# Patient Record
Sex: MALE | Race: WHITE | Employment: OTHER | URBAN - METROPOLITAN AREA
[De-identification: names, ages, dates, MRNs, and addresses within clinical notes are randomized per-mention and may not be internally consistent; named-entity substitution may affect disease eponyms.]

---

## 2019-05-15 ENCOUNTER — APPOINTMENT (OUTPATIENT)
Dept: GENERAL RADIOLOGY | Age: 84
DRG: 493 | End: 2019-05-15
Payer: MEDICARE

## 2019-05-15 ENCOUNTER — HOSPITAL ENCOUNTER (INPATIENT)
Age: 84
LOS: 6 days | Discharge: OTHER INSTITUTION | DRG: 493 | End: 2019-05-21
Attending: EMERGENCY MEDICINE | Admitting: FAMILY MEDICINE
Payer: MEDICARE

## 2019-05-15 DIAGNOSIS — S82.109A CLOSED FRACTURE OF PROXIMAL END OF TIBIA, UNSPECIFIED FRACTURE MORPHOLOGY, UNSPECIFIED LATERALITY, INITIAL ENCOUNTER: ICD-10-CM
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
## 2019-05-15 PROBLEM — S82.242A CLOSED DISPLACED SPIRAL FRACTURE OF SHAFT OF LEFT TIBIA: Status: ACTIVE | Noted: 2019-05-15

## 2019-05-15 LAB
ALBUMIN SERPL-MCNC: 4 G/DL (ref 3.5–5.2)
ALP BLD-CCNC: 90 U/L (ref 40–129)
ALT SERPL-CCNC: 16 U/L (ref 0–40)
ANION GAP SERPL CALCULATED.3IONS-SCNC: 8 MMOL/L (ref 7–16)
AST SERPL-CCNC: 40 U/L (ref 0–39)
BILIRUB SERPL-MCNC: 1.2 MG/DL (ref 0–1.2)
BUN BLDV-MCNC: 23 MG/DL (ref 8–23)
CALCIUM SERPL-MCNC: 9.3 MG/DL (ref 8.6–10.2)
CHLORIDE BLD-SCNC: 105 MMOL/L (ref 98–107)
CO2: 27 MMOL/L (ref 22–29)
CREAT SERPL-MCNC: 1.1 MG/DL (ref 0.7–1.2)
GFR AFRICAN AMERICAN: >60
GFR NON-AFRICAN AMERICAN: >60 ML/MIN/1.73
GLUCOSE BLD-MCNC: 127 MG/DL (ref 74–99)
POTASSIUM SERPL-SCNC: 6.5 MMOL/L (ref 3.5–5)
SODIUM BLD-SCNC: 140 MMOL/L (ref 132–146)
TOTAL PROTEIN: 6.3 G/DL (ref 6.4–8.3)

## 2019-05-15 PROCEDURE — 96374 THER/PROPH/DIAG INJ IV PUSH: CPT

## 2019-05-15 PROCEDURE — 73590 X-RAY EXAM OF LOWER LEG: CPT

## 2019-05-15 PROCEDURE — 1200000000 HC SEMI PRIVATE

## 2019-05-15 PROCEDURE — 73564 X-RAY EXAM KNEE 4 OR MORE: CPT

## 2019-05-15 PROCEDURE — 36415 COLL VENOUS BLD VENIPUNCTURE: CPT

## 2019-05-15 PROCEDURE — 71045 X-RAY EXAM CHEST 1 VIEW: CPT

## 2019-05-15 PROCEDURE — 73552 X-RAY EXAM OF FEMUR 2/>: CPT

## 2019-05-15 PROCEDURE — 80053 COMPREHEN METABOLIC PANEL: CPT

## 2019-05-15 PROCEDURE — 85025 COMPLETE CBC W/AUTO DIFF WBC: CPT

## 2019-05-15 PROCEDURE — 99285 EMERGENCY DEPT VISIT HI MDM: CPT

## 2019-05-15 PROCEDURE — 73562 X-RAY EXAM OF KNEE 3: CPT

## 2019-05-15 PROCEDURE — 6360000002 HC RX W HCPCS: Performed by: STUDENT IN AN ORGANIZED HEALTH CARE EDUCATION/TRAINING PROGRAM

## 2019-05-15 RX ORDER — UBIDECARENONE 100 MG
100 CAPSULE ORAL DAILY
COMMUNITY
End: 2021-01-01

## 2019-05-15 RX ORDER — FENTANYL CITRATE 50 UG/ML
INJECTION, SOLUTION INTRAMUSCULAR; INTRAVENOUS
Status: DISCONTINUED
Start: 2019-05-15 | End: 2019-05-16 | Stop reason: WASHOUT

## 2019-05-15 RX ORDER — LANOLIN ALCOHOL/MO/W.PET/CERES
500 CREAM (GRAM) TOPICAL NIGHTLY
COMMUNITY

## 2019-05-15 RX ORDER — FENTANYL CITRATE 50 UG/ML
50 INJECTION, SOLUTION INTRAMUSCULAR; INTRAVENOUS ONCE
Status: COMPLETED | OUTPATIENT
Start: 2019-05-15 | End: 2019-05-15

## 2019-05-15 RX ORDER — CEFAZOLIN SODIUM 2 G/50ML
2 SOLUTION INTRAVENOUS
Status: CANCELLED | OUTPATIENT
Start: 2019-05-15 | End: 2019-05-15

## 2019-05-15 RX ORDER — DOCUSATE SODIUM 100 MG/1
100 CAPSULE, LIQUID FILLED ORAL NIGHTLY
COMMUNITY
End: 2022-01-01

## 2019-05-15 RX ADMIN — FENTANYL CITRATE 50 MCG: 50 INJECTION, SOLUTION INTRAMUSCULAR; INTRAVENOUS at 23:22

## 2019-05-15 ASSESSMENT — PAIN SCALES - GENERAL
PAINLEVEL_OUTOF10: 7
PAINLEVEL_OUTOF10: 6

## 2019-05-15 ASSESSMENT — PAIN DESCRIPTION - PAIN TYPE: TYPE: ACUTE PAIN

## 2019-05-16 ENCOUNTER — ANESTHESIA (OUTPATIENT)
Dept: OPERATING ROOM | Age: 84
DRG: 493 | End: 2019-05-16
Payer: MEDICARE

## 2019-05-16 ENCOUNTER — TELEPHONE (OUTPATIENT)
Dept: ORTHOPEDIC SURGERY | Age: 84
End: 2019-05-16

## 2019-05-16 ENCOUNTER — ANESTHESIA EVENT (OUTPATIENT)
Dept: OPERATING ROOM | Age: 84
DRG: 493 | End: 2019-05-16
Payer: MEDICARE

## 2019-05-16 ENCOUNTER — APPOINTMENT (OUTPATIENT)
Dept: GENERAL RADIOLOGY | Age: 84
DRG: 493 | End: 2019-05-16
Payer: MEDICARE

## 2019-05-16 ENCOUNTER — APPOINTMENT (OUTPATIENT)
Dept: CT IMAGING | Age: 84
DRG: 493 | End: 2019-05-16
Payer: MEDICARE

## 2019-05-16 VITALS
DIASTOLIC BLOOD PRESSURE: 83 MMHG | SYSTOLIC BLOOD PRESSURE: 140 MMHG | RESPIRATION RATE: 3 BRPM | OXYGEN SATURATION: 96 %
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
## 2019-05-16 LAB
ABO/RH: NORMAL
ABO/RH: NORMAL
ANTIBODY SCREEN: NORMAL
APTT: 27.2 SEC (ref 24.5–35.1)
BASOPHILS ABSOLUTE: 0 E9/L (ref 0–0.2)
BASOPHILS RELATIVE PERCENT: 0 % (ref 0–2)
EKG ATRIAL RATE: 76 BPM
EKG P AXIS: 80 DEGREES
EKG P-R INTERVAL: 150 MS
EKG Q-T INTERVAL: 444 MS
EKG QRS DURATION: 104 MS
EKG QTC CALCULATION (BAZETT): 499 MS
EKG R AXIS: -4 DEGREES
EKG T AXIS: 129 DEGREES
EKG VENTRICULAR RATE: 76 BPM
EOSINOPHILS ABSOLUTE: 0.22 E9/L (ref 0.05–0.5)
EOSINOPHILS RELATIVE PERCENT: 1 % (ref 0–6)
HCT VFR BLD CALC: 46.9 % (ref 37–54)
HEMOGLOBIN: 15.4 G/DL (ref 12.5–16.5)
INR BLD: 1.2
LYMPHOCYTES ABSOLUTE: 17.39 E9/L (ref 1.5–4)
LYMPHOCYTES RELATIVE PERCENT: 78 % (ref 20–42)
MCH RBC QN AUTO: 30.9 PG (ref 26–35)
MCHC RBC AUTO-ENTMCNC: 32.8 % (ref 32–34.5)
MCV RBC AUTO: 94.2 FL (ref 80–99.9)
MONOCYTES ABSOLUTE: 0.67 E9/L (ref 0.1–0.95)
MONOCYTES RELATIVE PERCENT: 3 % (ref 2–12)
NEUTROPHILS ABSOLUTE: 4.01 E9/L (ref 1.8–7.3)
NEUTROPHILS RELATIVE PERCENT: 18 % (ref 43–80)
PDW BLD-RTO: 13.8 FL (ref 11.5–15)
PLATELET # BLD: 119 E9/L (ref 130–450)
PMV BLD AUTO: 11 FL (ref 7–12)
PROTHROMBIN TIME: 13.8 SEC (ref 9.3–12.4)
RBC # BLD: 4.98 E12/L (ref 3.8–5.8)
RBC # BLD: NORMAL 10*6/UL
SMUDGE CELLS: ABNORMAL
WBC # BLD: 22.3 E9/L (ref 4.5–11.5)

## 2019-05-16 PROCEDURE — 0QSH3CZ REPOSITION LEFT TIBIA WITH RING EXTERNAL FIXATION DEVICE, PERCUTANEOUS APPROACH: ICD-10-PCS | Performed by: ORTHOPAEDIC SURGERY

## 2019-05-16 PROCEDURE — 36415 COLL VENOUS BLD VENIPUNCTURE: CPT

## 2019-05-16 PROCEDURE — 2720000010 HC SURG SUPPLY STERILE: Performed by: ORTHOPAEDIC SURGERY

## 2019-05-16 PROCEDURE — 6360000002 HC RX W HCPCS: Performed by: STUDENT IN AN ORGANIZED HEALTH CARE EDUCATION/TRAINING PROGRAM

## 2019-05-16 PROCEDURE — 73560 X-RAY EXAM OF KNEE 1 OR 2: CPT

## 2019-05-16 PROCEDURE — 27752 TREATMENT OF TIBIA FRACTURE: CPT | Performed by: ORTHOPAEDIC SURGERY

## 2019-05-16 PROCEDURE — 86901 BLOOD TYPING SEROLOGIC RH(D): CPT

## 2019-05-16 PROCEDURE — 2580000003 HC RX 258: Performed by: NURSE ANESTHETIST, CERTIFIED REGISTERED

## 2019-05-16 PROCEDURE — 86850 RBC ANTIBODY SCREEN: CPT

## 2019-05-16 PROCEDURE — 3700000001 HC ADD 15 MINUTES (ANESTHESIA): Performed by: ORTHOPAEDIC SURGERY

## 2019-05-16 PROCEDURE — 85610 PROTHROMBIN TIME: CPT

## 2019-05-16 PROCEDURE — 3600000005 HC SURGERY LEVEL 5 BASE: Performed by: ORTHOPAEDIC SURGERY

## 2019-05-16 PROCEDURE — 86900 BLOOD TYPING SEROLOGIC ABO: CPT

## 2019-05-16 PROCEDURE — 3600000015 HC SURGERY LEVEL 5 ADDTL 15MIN: Performed by: ORTHOPAEDIC SURGERY

## 2019-05-16 PROCEDURE — 70450 CT HEAD/BRAIN W/O DYE: CPT

## 2019-05-16 PROCEDURE — C1713 ANCHOR/SCREW BN/BN,TIS/BN: HCPCS | Performed by: ORTHOPAEDIC SURGERY

## 2019-05-16 PROCEDURE — 3209999900 FLUORO FOR SURGICAL PROCEDURES

## 2019-05-16 PROCEDURE — 1200000000 HC SEMI PRIVATE

## 2019-05-16 PROCEDURE — 99222 1ST HOSP IP/OBS MODERATE 55: CPT | Performed by: ORTHOPAEDIC SURGERY

## 2019-05-16 PROCEDURE — 20690 APPL UNIPLN UNI EXT FIXJ SYS: CPT | Performed by: ORTHOPAEDIC SURGERY

## 2019-05-16 PROCEDURE — 6370000000 HC RX 637 (ALT 250 FOR IP): Performed by: STUDENT IN AN ORGANIZED HEALTH CARE EDUCATION/TRAINING PROGRAM

## 2019-05-16 PROCEDURE — 93005 ELECTROCARDIOGRAM TRACING: CPT | Performed by: STUDENT IN AN ORGANIZED HEALTH CARE EDUCATION/TRAINING PROGRAM

## 2019-05-16 PROCEDURE — 85730 THROMBOPLASTIN TIME PARTIAL: CPT

## 2019-05-16 PROCEDURE — 7100000001 HC PACU RECOVERY - ADDTL 15 MIN: Performed by: ORTHOPAEDIC SURGERY

## 2019-05-16 PROCEDURE — 3700000000 HC ANESTHESIA ATTENDED CARE: Performed by: ORTHOPAEDIC SURGERY

## 2019-05-16 PROCEDURE — 2500000003 HC RX 250 WO HCPCS: Performed by: NURSE ANESTHETIST, CERTIFIED REGISTERED

## 2019-05-16 PROCEDURE — 2580000003 HC RX 258: Performed by: STUDENT IN AN ORGANIZED HEALTH CARE EDUCATION/TRAINING PROGRAM

## 2019-05-16 PROCEDURE — 7100000000 HC PACU RECOVERY - FIRST 15 MIN: Performed by: ORTHOPAEDIC SURGERY

## 2019-05-16 PROCEDURE — 27781 TREATMENT OF FIBULA FRACTURE: CPT | Performed by: ORTHOPAEDIC SURGERY

## 2019-05-16 PROCEDURE — 6370000000 HC RX 637 (ALT 250 FOR IP): Performed by: FAMILY MEDICINE

## 2019-05-16 PROCEDURE — 6360000002 HC RX W HCPCS: Performed by: NURSE ANESTHETIST, CERTIFIED REGISTERED

## 2019-05-16 PROCEDURE — 93010 ELECTROCARDIOGRAM REPORT: CPT | Performed by: INTERNAL MEDICINE

## 2019-05-16 DEVICE — SCREW EXT FIX L175MM DIA5MM THRD L60MM S STL SELF DRL SCHNZ: Type: IMPLANTABLE DEVICE | Site: TIBIA | Status: FUNCTIONAL

## 2019-05-16 DEVICE — SCREW FIX L200MM DIA5MM THRD L80MM S STL SELF DRL SCHNZ: Type: IMPLANTABLE DEVICE | Site: TIBIA | Status: FUNCTIONAL

## 2019-05-16 RX ORDER — ONDANSETRON 2 MG/ML
4 INJECTION INTRAMUSCULAR; INTRAVENOUS EVERY 6 HOURS PRN
Status: DISCONTINUED | OUTPATIENT
Start: 2019-05-16 | End: 2019-05-21 | Stop reason: HOSPADM

## 2019-05-16 RX ORDER — SODIUM CHLORIDE 0.9 % (FLUSH) 0.9 %
10 SYRINGE (ML) INJECTION EVERY 12 HOURS SCHEDULED
Status: DISCONTINUED | OUTPATIENT
Start: 2019-05-16 | End: 2019-05-21 | Stop reason: HOSPADM

## 2019-05-16 RX ORDER — DOCUSATE SODIUM 100 MG/1
100 CAPSULE, LIQUID FILLED ORAL 2 TIMES DAILY
Status: DISCONTINUED | OUTPATIENT
Start: 2019-05-16 | End: 2019-05-21 | Stop reason: HOSPADM

## 2019-05-16 RX ORDER — SODIUM CHLORIDE 9 MG/ML
INJECTION, SOLUTION INTRAVENOUS CONTINUOUS PRN
Status: DISCONTINUED | OUTPATIENT
Start: 2019-05-16 | End: 2019-05-16 | Stop reason: SDUPTHER

## 2019-05-16 RX ORDER — ATORVASTATIN CALCIUM 40 MG/1
40 TABLET, FILM COATED ORAL DAILY
Status: DISCONTINUED | OUTPATIENT
Start: 2019-05-16 | End: 2019-05-21 | Stop reason: HOSPADM

## 2019-05-16 RX ORDER — CEFAZOLIN SODIUM 2 G/50ML
2 SOLUTION INTRAVENOUS EVERY 8 HOURS
Status: COMPLETED | OUTPATIENT
Start: 2019-05-16 | End: 2019-05-17

## 2019-05-16 RX ORDER — ROCURONIUM BROMIDE 10 MG/ML
INJECTION, SOLUTION INTRAVENOUS PRN
Status: DISCONTINUED | OUTPATIENT
Start: 2019-05-16 | End: 2019-05-16 | Stop reason: SDUPTHER

## 2019-05-16 RX ORDER — CEFAZOLIN SODIUM 1 G/3ML
INJECTION, POWDER, FOR SOLUTION INTRAMUSCULAR; INTRAVENOUS PRN
Status: DISCONTINUED | OUTPATIENT
Start: 2019-05-16 | End: 2019-05-16 | Stop reason: SDUPTHER

## 2019-05-16 RX ORDER — FINASTERIDE 5 MG/1
5 TABLET, FILM COATED ORAL DAILY
Status: DISCONTINUED | OUTPATIENT
Start: 2019-05-16 | End: 2019-05-21 | Stop reason: HOSPADM

## 2019-05-16 RX ORDER — MULTIVITAMIN WITH FOLIC ACID 400 MCG
1 TABLET ORAL DAILY
Status: DISCONTINUED | OUTPATIENT
Start: 2019-05-16 | End: 2019-05-21 | Stop reason: HOSPADM

## 2019-05-16 RX ORDER — LIDOCAINE HYDROCHLORIDE 20 MG/ML
INJECTION, SOLUTION INTRAVENOUS PRN
Status: DISCONTINUED | OUTPATIENT
Start: 2019-05-16 | End: 2019-05-16 | Stop reason: SDUPTHER

## 2019-05-16 RX ORDER — OXYCODONE HYDROCHLORIDE AND ACETAMINOPHEN 5; 325 MG/1; MG/1
1 TABLET ORAL
Status: DISCONTINUED | OUTPATIENT
Start: 2019-05-16 | End: 2019-05-16

## 2019-05-16 RX ORDER — DOCUSATE SODIUM 100 MG/1
100 CAPSULE, LIQUID FILLED ORAL NIGHTLY
Status: DISCONTINUED | OUTPATIENT
Start: 2019-05-16 | End: 2019-05-16 | Stop reason: SDUPTHER

## 2019-05-16 RX ORDER — SODIUM CHLORIDE 0.9 % (FLUSH) 0.9 %
10 SYRINGE (ML) INJECTION PRN
Status: DISCONTINUED | OUTPATIENT
Start: 2019-05-16 | End: 2019-05-21 | Stop reason: HOSPADM

## 2019-05-16 RX ORDER — PROPOFOL 10 MG/ML
INJECTION, EMULSION INTRAVENOUS PRN
Status: DISCONTINUED | OUTPATIENT
Start: 2019-05-16 | End: 2019-05-16 | Stop reason: SDUPTHER

## 2019-05-16 RX ORDER — OXYCODONE HYDROCHLORIDE AND ACETAMINOPHEN 5; 325 MG/1; MG/1
2 TABLET ORAL EVERY 4 HOURS PRN
Status: DISCONTINUED | OUTPATIENT
Start: 2019-05-16 | End: 2019-05-21 | Stop reason: HOSPADM

## 2019-05-16 RX ORDER — LANOLIN ALCOHOL/MO/W.PET/CERES
500 CREAM (GRAM) TOPICAL NIGHTLY
Status: DISCONTINUED | OUTPATIENT
Start: 2019-05-16 | End: 2019-05-21 | Stop reason: HOSPADM

## 2019-05-16 RX ORDER — TAMSULOSIN HYDROCHLORIDE 0.4 MG/1
0.4 CAPSULE ORAL DAILY
Status: DISCONTINUED | OUTPATIENT
Start: 2019-05-16 | End: 2019-05-21 | Stop reason: HOSPADM

## 2019-05-16 RX ORDER — MORPHINE SULFATE 2 MG/ML
1 INJECTION, SOLUTION INTRAMUSCULAR; INTRAVENOUS
Status: DISCONTINUED | OUTPATIENT
Start: 2019-05-16 | End: 2019-05-21 | Stop reason: HOSPADM

## 2019-05-16 RX ORDER — MORPHINE SULFATE 2 MG/ML
2 INJECTION, SOLUTION INTRAMUSCULAR; INTRAVENOUS EVERY 5 MIN PRN
Status: DISCONTINUED | OUTPATIENT
Start: 2019-05-16 | End: 2019-05-16

## 2019-05-16 RX ORDER — BISACODYL 10 MG
10 SUPPOSITORY, RECTAL RECTAL DAILY PRN
Status: DISCONTINUED | OUTPATIENT
Start: 2019-05-16 | End: 2019-05-21 | Stop reason: HOSPADM

## 2019-05-16 RX ORDER — OXYCODONE HYDROCHLORIDE AND ACETAMINOPHEN 5; 325 MG/1; MG/1
1 TABLET ORAL EVERY 4 HOURS PRN
Status: DISCONTINUED | OUTPATIENT
Start: 2019-05-16 | End: 2019-05-21 | Stop reason: HOSPADM

## 2019-05-16 RX ORDER — HYDROCODONE BITARTRATE AND ACETAMINOPHEN 5; 325 MG/1; MG/1
1 TABLET ORAL EVERY 6 HOURS PRN
Qty: 28 TABLET | Refills: 0 | Status: SHIPPED | OUTPATIENT
Start: 2019-05-16 | End: 2019-05-23

## 2019-05-16 RX ORDER — VITAMIN C
1 TAB ORAL DAILY
Status: DISCONTINUED | OUTPATIENT
Start: 2019-05-16 | End: 2019-05-21 | Stop reason: HOSPADM

## 2019-05-16 RX ORDER — MORPHINE SULFATE 2 MG/ML
2 INJECTION, SOLUTION INTRAMUSCULAR; INTRAVENOUS
Status: DISCONTINUED | OUTPATIENT
Start: 2019-05-16 | End: 2019-05-21 | Stop reason: HOSPADM

## 2019-05-16 RX ORDER — SODIUM CHLORIDE 9 MG/ML
INJECTION, SOLUTION INTRAVENOUS CONTINUOUS
Status: ACTIVE | OUTPATIENT
Start: 2019-05-16 | End: 2019-05-17

## 2019-05-16 RX ORDER — UBIDECARENONE 100 MG
100 CAPSULE ORAL DAILY
Status: DISCONTINUED | OUTPATIENT
Start: 2019-05-16 | End: 2019-05-16 | Stop reason: CLARIF

## 2019-05-16 RX ORDER — MORPHINE SULFATE 2 MG/ML
1 INJECTION, SOLUTION INTRAMUSCULAR; INTRAVENOUS EVERY 5 MIN PRN
Status: DISCONTINUED | OUTPATIENT
Start: 2019-05-16 | End: 2019-05-16

## 2019-05-16 RX ADMIN — SODIUM CHLORIDE: 9 INJECTION, SOLUTION INTRAVENOUS at 12:46

## 2019-05-16 RX ADMIN — Medication 500 MG: at 20:12

## 2019-05-16 RX ADMIN — ATORVASTATIN CALCIUM 40 MG: 40 TABLET, FILM COATED ORAL at 17:31

## 2019-05-16 RX ADMIN — PROPOFOL 100 MG: 10 INJECTION, EMULSION INTRAVENOUS at 10:36

## 2019-05-16 RX ADMIN — LIDOCAINE HYDROCHLORIDE 50 MG: 20 INJECTION, SOLUTION INTRAVENOUS at 10:36

## 2019-05-16 RX ADMIN — METOPROLOL TARTRATE 25 MG: 25 TABLET, FILM COATED ORAL at 20:12

## 2019-05-16 RX ADMIN — PHENYLEPHRINE HYDROCHLORIDE 100 MCG: 10 INJECTION INTRAVENOUS at 10:41

## 2019-05-16 RX ADMIN — FINASTERIDE 5 MG: 5 TABLET, FILM COATED ORAL at 17:29

## 2019-05-16 RX ADMIN — CEFAZOLIN 2000 MG: 1 INJECTION, POWDER, FOR SOLUTION INTRAMUSCULAR; INTRAVENOUS at 10:30

## 2019-05-16 RX ADMIN — VITAMIN C 1 TABLET: TAB at 17:29

## 2019-05-16 RX ADMIN — ROCURONIUM BROMIDE 10 MG: 10 INJECTION, SOLUTION INTRAVENOUS at 10:36

## 2019-05-16 RX ADMIN — MULTIVITAMIN TABLET 1 TABLET: TABLET at 17:29

## 2019-05-16 RX ADMIN — SODIUM CHLORIDE: 9 INJECTION, SOLUTION INTRAVENOUS at 10:03

## 2019-05-16 RX ADMIN — PHENYLEPHRINE HYDROCHLORIDE 100 MCG: 10 INJECTION INTRAVENOUS at 10:25

## 2019-05-16 RX ADMIN — DOCUSATE SODIUM 100 MG: 100 CAPSULE, LIQUID FILLED ORAL at 20:12

## 2019-05-16 RX ADMIN — CEFAZOLIN SODIUM 2 G: 2 SOLUTION INTRAVENOUS at 18:58

## 2019-05-16 RX ADMIN — TAMSULOSIN HYDROCHLORIDE 0.4 MG: 0.4 CAPSULE ORAL at 17:31

## 2019-05-16 ASSESSMENT — PULMONARY FUNCTION TESTS
PIF_VALUE: 14
PIF_VALUE: 14
PIF_VALUE: 12
PIF_VALUE: 12
PIF_VALUE: 0
PIF_VALUE: 13
PIF_VALUE: 12
PIF_VALUE: 12
PIF_VALUE: 14
PIF_VALUE: 13
PIF_VALUE: 14
PIF_VALUE: 11
PIF_VALUE: 13
PIF_VALUE: 14
PIF_VALUE: 0
PIF_VALUE: 12
PIF_VALUE: 11
PIF_VALUE: 2
PIF_VALUE: 0
PIF_VALUE: 14
PIF_VALUE: 0
PIF_VALUE: 14
PIF_VALUE: 12
PIF_VALUE: 14
PIF_VALUE: 12
PIF_VALUE: 12
PIF_VALUE: 13
PIF_VALUE: 14
PIF_VALUE: 11
PIF_VALUE: 0
PIF_VALUE: 5
PIF_VALUE: 14
PIF_VALUE: 19
PIF_VALUE: 14
PIF_VALUE: 14
PIF_VALUE: 1
PIF_VALUE: 3
PIF_VALUE: 0
PIF_VALUE: 14
PIF_VALUE: 4
PIF_VALUE: 13
PIF_VALUE: 11
PIF_VALUE: 13
PIF_VALUE: 14
PIF_VALUE: 7
PIF_VALUE: 15
PIF_VALUE: 4

## 2019-05-16 ASSESSMENT — PAIN SCALES - GENERAL
PAINLEVEL_OUTOF10: 0

## 2019-05-16 ASSESSMENT — ENCOUNTER SYMPTOMS
ABDOMINAL PAIN: 0
SHORTNESS OF BREATH: 0
COLOR CHANGE: 0
EYE DISCHARGE: 0

## 2019-05-16 NOTE — PLAN OF CARE
Problem: Pain:  Goal: Control of acute pain  Description  Control of acute pain  Outcome: Met This Shift

## 2019-05-16 NOTE — ANESTHESIA POSTPROCEDURE EVALUATION
Department of Anesthesiology  Postprocedure Note    Patient: Joycelyn Wells  MRN: [id number]  YOB: 1934  Date of evaluation: 5/16/2019  Time:  2:22 PM     Procedure Summary     Date:  05/16/19 Room / Location:  [facility] / [facility]    Anesthesia Start:  1010 Anesthesia Stop:  1107    Procedure:  APPLICATION EX FIX TO LEFT PROXIMAL TIBIAL FRACTURE (Left Leg Lower) Diagnosis:  (FX TIBIA)    Surgeon:  Stephanie Pinto MD Responsible Provider:  Chadd Lipscomb DO    Anesthesia Type:  general ASA Status:  3          Anesthesia Type: general    Donna Phase I: Donna Score: 10    Donna Phase II:      Last vitals: Reviewed and per EMR flowsheets.        Anesthesia Post Evaluation    Patient location during evaluation: PACU  Patient participation: complete - patient participated  Level of consciousness: awake and alert  Pain score: 1  Airway patency: patent  Nausea & Vomiting: no nausea and no vomiting  Complications: no  Cardiovascular status: hemodynamically stable  Respiratory status: acceptable  Hydration status: euvolemic
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
## 2019-05-16 NOTE — PROGRESS NOTES
Felton Lincoln was ordered Coenzyme Q10. As per the [address] [facility], nonformulary herbals and certain dietary supplements will be discontinued.  The herbal or dietary supplement may be continued after discharge from the hospital.  Jayne Ashby [facility],5/16/2019 3:57 PM

## 2019-05-16 NOTE — BRIEF OP NOTE
Brief Postoperative Note  ______________________________________________________________    Patient: Martín Kuhn  YOB: 1934  MRN: [id number]  Date of Procedure: 5/16/2019    Pre-Op Diagnosis: FX TIBIA    Post-Op Diagnosis: Same       Procedure(s):  APPLICATION EX FIX TO LEFT PROXIMAL TIBIAL FRACTURE    Anesthesia: General    Surgeon(s):  Sheeba Brito MD    Assistant: Sweetie Em    Estimated Blood Loss (mL): less than 50     Complications: None    Specimens:   * No specimens in log *    Implants:  Implant Name Type Inv.  Item Serial No.  Lot No. LRB No. Used   SCREW SCHNZ EXT FIX SLF DRILL [id number] Screw/Plate/Nail/Dejuan SCREW SCHNZ EXT FIX SLF DRILL [id number]  Zyncd  Left 2   SCREW SCHNZ EXT FIX SLF DRILL [id number] Screw/Plate/Nail/Dejuan SCREW SCHNZ EXT FIX SLF DRILL [id number]  Zyncd  Left 2         Drains:   Urethral Catheter (Active)   Urine Color Yellow 5/16/2019  6:24 AM   Urine Appearance Clear 5/16/2019  6:24 AM   Output (mL) 600 mL 5/16/2019  6:24 AM       Findings: See op note    Milli Rao DO  Date: 5/16/2019  Time: 10:58 AM

## 2019-05-16 NOTE — CONSULTS
file.  ETOH:   reports that he does not drink alcohol. DRUGS:   reports that he does not use drugs. ACTIVITIES OF DAILY LIVING:    OCCUPATION:    Family History:       Problem Relation Age of Onset    Heart Disease Mother     Heart Disease Father        REVIEW OF SYSTEMS:  CONSTITUTIONAL:  negative for  fevers, chills  EYES:  negative for blurred vision, visual disturbance  HEENT:  negative for  hearing loss, voice change  RESPIRATORY:  negative for  dyspnea, wheezing  CARDIOVASCULAR:  negative for  chest pain, palpitations  GASTROINTESTINAL:  negative for nausea, vomiting  GENITOURINARY:  negative for frequency, urinary incontinence  HEMATOLOGIC/LYMPHATIC:  negative for bleeding and petechiae  MUSCULOSKELETAL:  positive for  Pain left knee  NEUROLOGICAL:  negative for headaches, dizziness  BEHAVIOR/PSYCH:  negative for increased agitation and anxiety    PHYSICAL EXAM:    VITALS:  /69   Pulse 85   Temp 98.3 °F (36.8 °C) (Temporal)   Resp 16   Ht 6' 1\" (1.854 m)   Wt 200 lb (90.7 kg)   SpO2 96%   BMI 26.39 kg/m²   CONSTITUTIONAL:  awake, alert, cooperative, no apparent distress, and appears stated age  MUSCULOSKELETAL:  Left lower Extremity:  · Skin intact circumferentially  · There is moderate edema to the proximal part of the left upper extremity. The knee joint on the lateral side. · All compartments are soft and compressible   · There is no pain with passive flexion of the toes  · Patient is able to actively flex and extend the toes and ankle. · Positive TTP about the knee diffusely  · Sensations intact to light touch in the sural, saphenous, tibial, deep and superficial peroneal nerve distributions to the foot  · +2/4 DP and PT pulses    Secondary Exam:   · bilateralUE: No obvious signs of trauma. -TTP to fingers, hand, wrist, forearm, elbow, humerus, shoulder or clavicle. -- Patient able to flex/extend fingers, wrist, elbow and shoulder with active and passive ROM without pain, +2/4 Radial pulse, cap refill <3sec, +AIN/PIN/Radial/Ulnar/Median N, distal sensation grossly intact to C4-T1 dermatomes, compartments soft and compressible. · rightLE: No obvious signs of trauma. -TTP to foot, ankle, leg, knee, thigh, hip.-- Patient able to flex/extend toes, ankle, knee and hip with active and passive ROM without pain,+2/4 DP & PT pulses, cap refill <3sec, +5/5 PF/DF/EHL, distal sensation grossly intact to L4-S1 dermatomes, compartments soft and compressible. · Pelvis: -TTP, -Log roll, -Heel strike     DATA:    CBC:   Lab Results   Component Value Date    WBC 17.5 06/05/2017    RBC 5.07 06/05/2017    HGB 15.3 06/05/2017    HCT 46.4 06/05/2017    MCV 91.5 06/05/2017    MCH 30.2 06/05/2017    MCHC 33.0 06/05/2017    RDW 13.4 06/05/2017     06/05/2017    MPV 10.8 06/05/2017     PT/INR:    Lab Results   Component Value Date    PROTIME 13.9 10/17/2012    INR 1.6 10/17/2012       Radiology Review:  X-ray left knee  Demonstrating a spiral fracture of the proximal one 3rd of the tibial shaft. With some post anterior and lateral displacement of the distal segment.  There is also a associated fibular neck fracture that is minimally displaced    IMPRESSION:  · Proximal tibial shaft fracture  · Fibular neck fracture    PLAN:  · Nonweightbearing to the left lower extremity  · Patient was placed into a well-padded long posterior splint  · Ice to the left knee  · Pain control per admitting  · Nothing by mouth after midnight  · Hold anticoagulants  · Plan for surgical fixation in the near future  · Discussed with Dr. Reuben Ashby
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
## 2019-05-16 NOTE — PROGRESS NOTES
Message sent via perfect serve to Dr. Moore December, patient did not urinate since he came to the unit from ED.  Patient was bladder scanned for 656mL
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
## 2019-05-16 NOTE — ED NOTES
Bed: 21  Expected date:   Expected time:   Means of arrival:   Comments:  ems     Scott Marti RN  05/15/19 2232

## 2019-05-17 LAB
ANION GAP SERPL CALCULATED.3IONS-SCNC: 9 MMOL/L (ref 7–16)
BUN BLDV-MCNC: 24 MG/DL (ref 8–23)
CALCIUM SERPL-MCNC: 8.3 MG/DL (ref 8.6–10.2)
CHLORIDE BLD-SCNC: 108 MMOL/L (ref 98–107)
CO2: 25 MMOL/L (ref 22–29)
CREAT SERPL-MCNC: 1.4 MG/DL (ref 0.7–1.2)
GFR AFRICAN AMERICAN: 58
GFR NON-AFRICAN AMERICAN: 48 ML/MIN/1.73
GLUCOSE BLD-MCNC: 166 MG/DL (ref 74–99)
HCT VFR BLD CALC: 38.3 % (ref 37–54)
HEMOGLOBIN: 12.6 G/DL (ref 12.5–16.5)
MCH RBC QN AUTO: 31 PG (ref 26–35)
MCHC RBC AUTO-ENTMCNC: 32.9 % (ref 32–34.5)
MCV RBC AUTO: 94.3 FL (ref 80–99.9)
PDW BLD-RTO: 13.6 FL (ref 11.5–15)
PLATELET # BLD: 116 E9/L (ref 130–450)
PMV BLD AUTO: 11.2 FL (ref 7–12)
POTASSIUM REFLEX MAGNESIUM: 3.8 MMOL/L (ref 3.5–5)
RBC # BLD: 4.06 E12/L (ref 3.8–5.8)
SODIUM BLD-SCNC: 142 MMOL/L (ref 132–146)
WBC # BLD: 24.3 E9/L (ref 4.5–11.5)

## 2019-05-17 PROCEDURE — 1200000000 HC SEMI PRIVATE

## 2019-05-17 PROCEDURE — 6370000000 HC RX 637 (ALT 250 FOR IP): Performed by: FAMILY MEDICINE

## 2019-05-17 PROCEDURE — 36415 COLL VENOUS BLD VENIPUNCTURE: CPT

## 2019-05-17 PROCEDURE — 97161 PT EVAL LOW COMPLEX 20 MIN: CPT

## 2019-05-17 PROCEDURE — 97530 THERAPEUTIC ACTIVITIES: CPT

## 2019-05-17 PROCEDURE — 97535 SELF CARE MNGMENT TRAINING: CPT

## 2019-05-17 PROCEDURE — 85027 COMPLETE CBC AUTOMATED: CPT

## 2019-05-17 PROCEDURE — 2580000003 HC RX 258: Performed by: STUDENT IN AN ORGANIZED HEALTH CARE EDUCATION/TRAINING PROGRAM

## 2019-05-17 PROCEDURE — 6370000000 HC RX 637 (ALT 250 FOR IP): Performed by: STUDENT IN AN ORGANIZED HEALTH CARE EDUCATION/TRAINING PROGRAM

## 2019-05-17 PROCEDURE — 6360000002 HC RX W HCPCS: Performed by: STUDENT IN AN ORGANIZED HEALTH CARE EDUCATION/TRAINING PROGRAM

## 2019-05-17 PROCEDURE — 97166 OT EVAL MOD COMPLEX 45 MIN: CPT

## 2019-05-17 PROCEDURE — 80048 BASIC METABOLIC PNL TOTAL CA: CPT

## 2019-05-17 RX ADMIN — MULTIVITAMIN TABLET 1 TABLET: TABLET at 09:50

## 2019-05-17 RX ADMIN — FINASTERIDE 5 MG: 5 TABLET, FILM COATED ORAL at 09:50

## 2019-05-17 RX ADMIN — ENOXAPARIN SODIUM 30 MG: 30 INJECTION SUBCUTANEOUS at 20:56

## 2019-05-17 RX ADMIN — METOPROLOL TARTRATE 25 MG: 25 TABLET, FILM COATED ORAL at 09:50

## 2019-05-17 RX ADMIN — DOCUSATE SODIUM 100 MG: 100 CAPSULE, LIQUID FILLED ORAL at 20:55

## 2019-05-17 RX ADMIN — DOCUSATE SODIUM 100 MG: 100 CAPSULE, LIQUID FILLED ORAL at 09:50

## 2019-05-17 RX ADMIN — Medication 500 MG: at 20:55

## 2019-05-17 RX ADMIN — OXYCODONE HYDROCHLORIDE AND ACETAMINOPHEN 1 TABLET: 5; 325 TABLET ORAL at 20:55

## 2019-05-17 RX ADMIN — VITAMIN C 1 TABLET: TAB at 09:50

## 2019-05-17 RX ADMIN — METOPROLOL TARTRATE 25 MG: 25 TABLET, FILM COATED ORAL at 20:55

## 2019-05-17 RX ADMIN — TAMSULOSIN HYDROCHLORIDE 0.4 MG: 0.4 CAPSULE ORAL at 09:50

## 2019-05-17 RX ADMIN — ENOXAPARIN SODIUM 30 MG: 30 INJECTION SUBCUTANEOUS at 09:50

## 2019-05-17 RX ADMIN — CEFAZOLIN SODIUM 2 G: 2 SOLUTION INTRAVENOUS at 02:38

## 2019-05-17 RX ADMIN — ATORVASTATIN CALCIUM 40 MG: 40 TABLET, FILM COATED ORAL at 09:50

## 2019-05-17 RX ADMIN — Medication 10 ML: at 20:55

## 2019-05-17 ASSESSMENT — PAIN DESCRIPTION - PAIN TYPE: TYPE: SURGICAL PAIN

## 2019-05-17 ASSESSMENT — PAIN SCALES - GENERAL
PAINLEVEL_OUTOF10: 0
PAINLEVEL_OUTOF10: 5

## 2019-05-17 ASSESSMENT — PAIN DESCRIPTION - FREQUENCY: FREQUENCY: INTERMITTENT

## 2019-05-17 ASSESSMENT — PAIN DESCRIPTION - DESCRIPTORS: DESCRIPTORS: ACHING;CONSTANT;DISCOMFORT

## 2019-05-17 ASSESSMENT — PAIN DESCRIPTION - LOCATION: LOCATION: TIBIA

## 2019-05-17 ASSESSMENT — PAIN DESCRIPTION - ORIENTATION: ORIENTATION: LEFT

## 2019-05-17 ASSESSMENT — PAIN DESCRIPTION - ONSET: ONSET: ON-GOING

## 2019-05-17 NOTE — CARE COORDINATION
5/17/2019 SOCIAL WORK TRANSITION OF CARE  Sw spoke with hui Heard from [facility], they will accept pt. Sw notified facility of pending weekend discharge. Sw will complete hens,ambulance form,and envelope.   Electronically signed by DREW Meneses on 5/17/2019 at 3:24 PM

## 2019-05-17 NOTE — PROGRESS NOTES
Jana Kamara is a 80 y.o. male patient. Patient resting comfortably.     Current Facility-Administered Medications   Medication Dose Route Frequency Provider Last Rate Last Dose    0.9 % sodium chloride infusion   Intravenous Continuous Zenon George,  mL/hr at 05/16/19 1246      sodium chloride flush 0.9 % injection 10 mL  10 mL Intravenous 2 times per day Zenon George, DO        sodium chloride flush 0.9 % injection 10 mL  10 mL Intravenous PRN Zenon George, DO        oxyCODONE-acetaminophen (PERCOCET) 5-325 MG per tablet 1 tablet  1 tablet Oral Q4H PRN Zenon George, DO        Or    oxyCODONE-acetaminophen (PERCOCET) 5-325 MG per tablet 2 tablet  2 tablet Oral Q4H PRN Zenon George, DO        morphine (PF) injection 1 mg  1 mg Intravenous Q3H PRN Zenon George, DO        Or    morphine (PF) injection 2 mg  2 mg Intravenous Q3H PRN Zenon George, DO        docusate sodium (COLACE) capsule 100 mg  100 mg Oral BID Zenon George, DO   100 mg at 05/16/19 2012    magnesium hydroxide (MILK OF MAGNESIA) 400 MG/5ML suspension 30 mL  30 mL Oral Daily PRN Zenon George, DO        bisacodyl (DULCOLAX) suppository 10 mg  10 mg Rectal Daily PRN Zenon George, DO        ondansetron [facility]) injection 4 mg  4 mg Intravenous Q6H PRN Zenon George, DO        enoxaparin (LOVENOX) injection 30 mg  30 mg Subcutaneous BID Zenon George, DO        atorvastatin (LIPITOR) tablet 40 mg  40 mg Oral Daily Edward Saez MD   40 mg at 05/16/19 1731    vitamin B and C (TOTAL B-C) 1 tablet  1 tablet Oral Daily Edward Saez MD   1 tablet at 05/16/19 1729    finasteride (PROSCAR) tablet 5 mg  5 mg Oral Daily Edward Saez MD   5 mg at 05/16/19 1729    metoprolol tartrate (LOPRESSOR) tablet 25 mg  25 mg Oral BID Edward Saez MD   25 mg at 05/16/19 2012    multivitamin 1 tablet  1 tablet Oral Daily Edward Saez MD   1 tablet at 05/16/19 1729    niacin extended release capsule 500 mg  500 mg Oral Nightly Marquis Alberto MD   500 mg at 05/16/19 2012    sodium chloride (OCEAN, BABY AYR) 0.65 % nasal spray 1 spray  1 spray Nasal Q4H PRN Marquis Alberto MD        tamsulosin [facility]) capsule 0.4 mg  0.4 mg Oral Daily Marquis Alberto MD   0.4 mg at 05/16/19 1731     No Known Allergies  Active Problems:    Closed displaced spiral fracture of shaft of left tibia    Falls, initial encounter  Resolved Problems:    * No resolved hospital problems. *    Blood pressure 105/60, pulse 72, temperature 96.9 °F (36.1 °C), temperature source Temporal, resp. rate 16, height 6' 1\" (1.854 m), weight 200 lb (90.7 kg), SpO2 93 %. Subjective:  Symptoms:  Stable. Diet:  Adequate intake. Activity level: Impaired due to pain. Pain:  He complains of pain that is mild. Objective:  General Appearance:  Comfortable. Vital signs: (most recent): Blood pressure 105/60, pulse 72, temperature 96.9 °F (36.1 °C), temperature source Temporal, resp. rate 16, height 6' 1\" (1.854 m), weight 200 lb (90.7 kg), SpO2 93 %. No fever. Lungs:  Normal effort and normal respiratory rate. Breath sounds clear to auscultation. Heart: Normal rate. Regular rhythm. S1 normal and S2 normal.      Assessment:  (Left tibia and fibular fracture  Fall  CAD  HTN  Hyperlipidemia    ). Plan:   (Stable after surgery   Monitor labs. Continue meds. Rehab evaluation).        Marquis Alberto MD  5/17/2019
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
## 2019-05-17 NOTE — PROGRESS NOTES
Physical Therapy    Facility/Department: [facility]  Initial Assessment    NAME: Connor Sweeney  : 1934  MRN: [id number]. Raghavendra Chawla Date of Service: 2019  Initial Assessment     Name: Connor Sweeney  : 1934  MRN: [id number]    Date of Service: 2019    Evaluating PT:  Lluvia Lin, PT, DPT [id number]    Room #:  [address] [place] [address]  Diagnosis:  L Proximal Tibial Shaft Fx  PMHx:  CAD, HTN, HLD  Precautions:  NWB LLE; Elevate ex- fix; Falls   Equipment Needs:      Pt lives alone with a caregiver 7x per week in a split level home with stair glide to enter. Bed is on  1st  floor and bath is on 1st floor. Pt reports using manual w/c for primary mode of mobility, and performs stand pivot transfer with or without ww. Initial Evaluation  Date:  Treatment Short Term/ Long Term   Goals   AM-PAC 6 Clicks 85/57     Was pt agreeable to Eval/treatment? Yes     Does pt have pain? 10     Bed Mobility  Rolling: ModA  Supine to sit: ModA  Sit to supine: NT  Scooting:  Mod A   Rolling: SBA  Supine to sit: SBA  Sit to supine: SBA  Scooting: SBA   Transfers Sit to stand: MaxA x 2  Stand to sit: MaxA x 2  Squat pivot: MaxA x 2   Sit to stand: SBA Using least restrictive device  Stand to sit: SBA Using least restrictive device  Stand pivot: SBA using Using least restrictive device   Slildeboard transfer: SBA    Ambulation    NT  NA   Stair negotiation: ascended and descended  NT  NA   ROM BUE:  WNL  Knee spanning ex fix to LLE (ankle ROM WNL)  WNL RLE     Strength BLE:   RLE 4-/5 grossly  LLE Deferred due to ex fix  Improve MMT by 1/5    Balance Sitting EOB:  Jamaal  Dynamic Standing:  MaxA x 2  Sitting EOB:  SBA  Dynamic Standing:  SBA     Pt is A & O x 4  Sensation:  Pt denies numbness and tingling to extremities  Edema:  Mild Swelling LLE      Patient education  Pt educated on safety and weight bearing status    Patient response to education:   Pt verbalized understanding Pt demonstrated skill Pt requires further education in this area   yes partial yes     Comments: Pt supine in bed upon entrance agreeable to PT evaluation. Pt educated on NWB LLE and elevation precaution. Pt required verbal and tactile cues to perform bed mobility. Pt requiring support of LLE initially at edge of bed due to pain. Pt performed 2x sit to stand attempts, requiring support of LLE to maintain NWB precaution. Pt required verbal and tactile cues to perform lateral scooting at edge of bed. Pt transferred from bed to chair with squat pivot with verbal and tactile cues, requiring support of LLE to maintain NWB LLE. Pt positioned in chair with LLE elevated, left with all needs and call bell in reach. PT returned for 2nd session. Pt performed squat pivot transfer from chair to bed with verbal and tactile cues for positioning and sequencing. Pt required support of LLE during transfer to maintain NWB. Pt returned to supine positioned with LLE elevated and all needs in reach. Pts/ family goals   1. Go to rehab and transition into assisted living    Patient and or family understand(s) diagnosis, prognosis, and plan of care. PLAN  PT care will be provided in accordance with the objectives noted above. Whenever appropriate, clear delegation orders will be provided for nursing staff. Exercises and functional mobility practice will be used as well as appropriate assistive devices or modalities to obtain goals. Patient and family education will also be administered as needed. Frequency of treatments: 2-5x/week x 7-10 days.     Time in  0840  Time out  0905    Time in  0930  Time out Staci 51, PT, DPT  [id number]

## 2019-05-18 LAB
HCT VFR BLD CALC: 36.8 % (ref 37–54)
HEMOGLOBIN: 12.1 G/DL (ref 12.5–16.5)
MCH RBC QN AUTO: 31.1 PG (ref 26–35)
MCHC RBC AUTO-ENTMCNC: 32.9 % (ref 32–34.5)
MCV RBC AUTO: 94.6 FL (ref 80–99.9)
PDW BLD-RTO: 13.9 FL (ref 11.5–15)
PLATELET # BLD: 101 E9/L (ref 130–450)
PMV BLD AUTO: 11.3 FL (ref 7–12)
RBC # BLD: 3.89 E12/L (ref 3.8–5.8)
WBC # BLD: 19 E9/L (ref 4.5–11.5)

## 2019-05-18 PROCEDURE — 1200000000 HC SEMI PRIVATE

## 2019-05-18 PROCEDURE — 6370000000 HC RX 637 (ALT 250 FOR IP): Performed by: FAMILY MEDICINE

## 2019-05-18 PROCEDURE — 6360000002 HC RX W HCPCS: Performed by: STUDENT IN AN ORGANIZED HEALTH CARE EDUCATION/TRAINING PROGRAM

## 2019-05-18 PROCEDURE — 6370000000 HC RX 637 (ALT 250 FOR IP): Performed by: STUDENT IN AN ORGANIZED HEALTH CARE EDUCATION/TRAINING PROGRAM

## 2019-05-18 PROCEDURE — 2580000003 HC RX 258: Performed by: STUDENT IN AN ORGANIZED HEALTH CARE EDUCATION/TRAINING PROGRAM

## 2019-05-18 PROCEDURE — 51701 INSERT BLADDER CATHETER: CPT

## 2019-05-18 PROCEDURE — 85027 COMPLETE CBC AUTOMATED: CPT

## 2019-05-18 PROCEDURE — 36415 COLL VENOUS BLD VENIPUNCTURE: CPT

## 2019-05-18 RX ADMIN — METOPROLOL TARTRATE 25 MG: 25 TABLET, FILM COATED ORAL at 09:17

## 2019-05-18 RX ADMIN — FINASTERIDE 5 MG: 5 TABLET, FILM COATED ORAL at 09:17

## 2019-05-18 RX ADMIN — Medication 10 ML: at 09:18

## 2019-05-18 RX ADMIN — OXYCODONE HYDROCHLORIDE AND ACETAMINOPHEN 2 TABLET: 5; 325 TABLET ORAL at 23:34

## 2019-05-18 RX ADMIN — TAMSULOSIN HYDROCHLORIDE 0.4 MG: 0.4 CAPSULE ORAL at 09:17

## 2019-05-18 RX ADMIN — Medication 10 ML: at 20:26

## 2019-05-18 RX ADMIN — ENOXAPARIN SODIUM 30 MG: 30 INJECTION SUBCUTANEOUS at 20:26

## 2019-05-18 RX ADMIN — DOCUSATE SODIUM 100 MG: 100 CAPSULE, LIQUID FILLED ORAL at 20:26

## 2019-05-18 RX ADMIN — MULTIVITAMIN TABLET 1 TABLET: TABLET at 09:17

## 2019-05-18 RX ADMIN — VITAMIN C 1 TABLET: TAB at 09:17

## 2019-05-18 RX ADMIN — METOPROLOL TARTRATE 25 MG: 25 TABLET, FILM COATED ORAL at 20:26

## 2019-05-18 RX ADMIN — DOCUSATE SODIUM 100 MG: 100 CAPSULE, LIQUID FILLED ORAL at 09:17

## 2019-05-18 RX ADMIN — ATORVASTATIN CALCIUM 40 MG: 40 TABLET, FILM COATED ORAL at 09:18

## 2019-05-18 RX ADMIN — ENOXAPARIN SODIUM 30 MG: 30 INJECTION SUBCUTANEOUS at 09:18

## 2019-05-18 RX ADMIN — Medication 500 MG: at 20:26

## 2019-05-18 ASSESSMENT — PAIN DESCRIPTION - ORIENTATION: ORIENTATION: LEFT

## 2019-05-18 ASSESSMENT — PAIN DESCRIPTION - PROGRESSION: CLINICAL_PROGRESSION: NOT CHANGED

## 2019-05-18 ASSESSMENT — PAIN SCALES - GENERAL
PAINLEVEL_OUTOF10: 0
PAINLEVEL_OUTOF10: 7

## 2019-05-18 ASSESSMENT — PAIN DESCRIPTION - ONSET: ONSET: ON-GOING

## 2019-05-18 ASSESSMENT — PAIN DESCRIPTION - PAIN TYPE: TYPE: SURGICAL PAIN

## 2019-05-18 ASSESSMENT — PAIN DESCRIPTION - FREQUENCY: FREQUENCY: INTERMITTENT

## 2019-05-18 ASSESSMENT — PAIN DESCRIPTION - DESCRIPTORS: DESCRIPTORS: ACHING;CONSTANT;DISCOMFORT

## 2019-05-18 ASSESSMENT — PAIN DESCRIPTION - LOCATION: LOCATION: LEG
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
## 2019-05-18 NOTE — CARE COORDINATION
5/18/2019 social work transition of care  Late entry:Sw was notified that Dock Burkburnett has been initiated and is pending. Mary/NATALIA will follow and assist prn.   Electronically signed by DREW Montez on 5/18/2019 at 7:48 AM

## 2019-05-18 NOTE — PROGRESS NOTES
Patient unable to void after thrasher was removed this AM. Patient bladder scanned for 580 ml. Order for straight cath obtained.

## 2019-05-18 NOTE — PLAN OF CARE
Problem: Pain:  Goal: Pain level will decrease  Description  Pain level will decrease  5/18/2019 0006 by Brittany Bell RN  Outcome: Met This Shift     Problem: Falls - Risk of:  Goal: Will remain free from falls  Description  Will remain free from falls  5/18/2019 0006 by Brittany Bell RN  Outcome: Met This Shift     Problem: Musculor/Skeletal Functional Status  Goal: Highest potential functional level  5/18/2019 0006 by Brittany Bell RN  Outcome: Met This Shift

## 2019-05-18 NOTE — PROGRESS NOTES
Urethral catheter removed, per order. No complications. Patient is DTV at 3pm. Patient notified to let staff know when he voids. Will pass onto morning shift.

## 2019-05-18 NOTE — PROGRESS NOTES
Connor Sweeney is a 80 y.o. male patient. Patient resting comfortably.     Current Facility-Administered Medications   Medication Dose Route Frequency Provider Last Rate Last Dose    sodium chloride flush 0.9 % injection 10 mL  10 mL Intravenous 2 times per day Reta Mandujano DO   10 mL at 05/17/19 2055    sodium chloride flush 0.9 % injection 10 mL  10 mL Intravenous PRN Reta Mandujano,         oxyCODONE-acetaminophen (PERCOCET) 5-325 MG per tablet 1 tablet  1 tablet Oral Q4H PRN Reta Mandujano, DO   1 tablet at 05/17/19 2055    Or    oxyCODONE-acetaminophen (PERCOCET) 5-325 MG per tablet 2 tablet  2 tablet Oral Q4H PRN Reta Mandujano,         morphine (PF) injection 1 mg  1 mg Intravenous Q3H PRN Reta Mandujano,         Or    morphine (PF) injection 2 mg  2 mg Intravenous Q3H PRN Reta Mandujano, DO        docusate sodium (COLACE) capsule 100 mg  100 mg Oral BID Reta Mandujano DO   100 mg at 05/17/19 2055    magnesium hydroxide (MILK OF MAGNESIA) 400 MG/5ML suspension 30 mL  30 mL Oral Daily PRN Reta Mandujano, DO        bisacodyl (DULCOLAX) suppository 10 mg  10 mg Rectal Daily PRN Reta Mandujano DO        ondansetron [facility]) injection 4 mg  4 mg Intravenous Q6H PRN Reta Mandujano, DO        enoxaparin (LOVENOX) injection 30 mg  30 mg Subcutaneous BID Reta Mandujano DO   30 mg at 05/17/19 2056    atorvastatin (LIPITOR) tablet 40 mg  40 mg Oral Daily Daryle Rad, MD   40 mg at 05/17/19 0950    vitamin B and C (TOTAL B-C) 1 tablet  1 tablet Oral Daily Daryle Rad, MD   1 tablet at 05/17/19 0950    finasteride (PROSCAR) tablet 5 mg  5 mg Oral Daily Daryle Rad, MD   5 mg at 05/17/19 0950    metoprolol tartrate (LOPRESSOR) tablet 25 mg  25 mg Oral BID Daryle Rad, MD   25 mg at 05/17/19 2055    multivitamin 1 tablet  1 tablet Oral Daily Daryle Rad, MD   1 tablet at 05/17/19 0518    niacin extended release capsule 500 mg  500 mg Oral Nightly Daryle Rad, MD   500 mg at 05/17/19 2055    sodium chloride (OCEAN, BABY AYR) 0.65 % nasal spray 1 spray  1 spray Nasal Q4H PRN Gia Arias MD        [facility] capsule 0.4 mg  0.4 mg Oral Daily Gia Arias MD   0.4 mg at 05/17/19 0950     No Known Allergies  Active Problems:    Closed displaced spiral fracture of shaft of left tibia    Falls, initial encounter  Resolved Problems:    * No resolved hospital problems. *    Blood pressure 116/63, pulse 79, temperature 97.6 °F (36.4 °C), temperature source Temporal, resp. rate 16, height 6' 1\" (1.854 m), weight 200 lb (90.7 kg), SpO2 92 %. Subjective:  Symptoms:  Stable. Diet:  Adequate intake. Activity level: Impaired due to pain. Pain:  He complains of pain that is mild. Objective:  General Appearance:  Comfortable. Vital signs: (most recent): Blood pressure 116/63, pulse 79, temperature 97.6 °F (36.4 °C), temperature source Temporal, resp. rate 16, height 6' 1\" (1.854 m), weight 200 lb (90.7 kg), SpO2 92 %. No fever. Lungs:  Normal effort and normal respiratory rate. Breath sounds clear to auscultation. Heart: Normal rate. Regular rhythm. S1 normal and S2 normal.      Assessment:  (Left tibia and fibular fracture  Fall  CAD  HTN  Hyperlipidemia    ). Plan:   (Stable after surgery   Monitor labs - WBC elevated but consistent and around his baseline as he has CLL. Continue meds. Rehab evaluation).        Gia Arias MD  5/18/2019

## 2019-05-19 PROCEDURE — 6370000000 HC RX 637 (ALT 250 FOR IP): Performed by: FAMILY MEDICINE

## 2019-05-19 PROCEDURE — 6370000000 HC RX 637 (ALT 250 FOR IP): Performed by: STUDENT IN AN ORGANIZED HEALTH CARE EDUCATION/TRAINING PROGRAM

## 2019-05-19 PROCEDURE — 2580000003 HC RX 258: Performed by: STUDENT IN AN ORGANIZED HEALTH CARE EDUCATION/TRAINING PROGRAM

## 2019-05-19 PROCEDURE — 1200000000 HC SEMI PRIVATE

## 2019-05-19 PROCEDURE — 6360000002 HC RX W HCPCS: Performed by: STUDENT IN AN ORGANIZED HEALTH CARE EDUCATION/TRAINING PROGRAM

## 2019-05-19 PROCEDURE — 97530 THERAPEUTIC ACTIVITIES: CPT

## 2019-05-19 PROCEDURE — 51798 US URINE CAPACITY MEASURE: CPT

## 2019-05-19 PROCEDURE — 97110 THERAPEUTIC EXERCISES: CPT

## 2019-05-19 PROCEDURE — 51702 INSERT TEMP BLADDER CATH: CPT

## 2019-05-19 RX ADMIN — MULTIVITAMIN TABLET 1 TABLET: TABLET at 09:20

## 2019-05-19 RX ADMIN — Medication 10 ML: at 20:32

## 2019-05-19 RX ADMIN — TAMSULOSIN HYDROCHLORIDE 0.4 MG: 0.4 CAPSULE ORAL at 09:20

## 2019-05-19 RX ADMIN — FINASTERIDE 5 MG: 5 TABLET, FILM COATED ORAL at 09:20

## 2019-05-19 RX ADMIN — DOCUSATE SODIUM 100 MG: 100 CAPSULE, LIQUID FILLED ORAL at 09:21

## 2019-05-19 RX ADMIN — METOPROLOL TARTRATE 25 MG: 25 TABLET, FILM COATED ORAL at 20:33

## 2019-05-19 RX ADMIN — Medication 500 MG: at 20:33

## 2019-05-19 RX ADMIN — ENOXAPARIN SODIUM 30 MG: 30 INJECTION SUBCUTANEOUS at 20:33

## 2019-05-19 RX ADMIN — VITAMIN C 1 TABLET: TAB at 09:21

## 2019-05-19 RX ADMIN — METOPROLOL TARTRATE 25 MG: 25 TABLET, FILM COATED ORAL at 09:20

## 2019-05-19 RX ADMIN — OXYCODONE HYDROCHLORIDE AND ACETAMINOPHEN 2 TABLET: 5; 325 TABLET ORAL at 12:18

## 2019-05-19 RX ADMIN — ENOXAPARIN SODIUM 30 MG: 30 INJECTION SUBCUTANEOUS at 09:21

## 2019-05-19 RX ADMIN — Medication 10 ML: at 09:21

## 2019-05-19 RX ADMIN — ATORVASTATIN CALCIUM 40 MG: 40 TABLET, FILM COATED ORAL at 09:20

## 2019-05-19 ASSESSMENT — PAIN DESCRIPTION - ORIENTATION: ORIENTATION: LEFT

## 2019-05-19 ASSESSMENT — PAIN DESCRIPTION - LOCATION: LOCATION: LEG

## 2019-05-19 ASSESSMENT — PAIN SCALES - GENERAL
PAINLEVEL_OUTOF10: 6
PAINLEVEL_OUTOF10: 0

## 2019-05-19 ASSESSMENT — PAIN DESCRIPTION - PAIN TYPE: TYPE: SURGICAL PAIN

## 2019-05-19 ASSESSMENT — PAIN DESCRIPTION - FREQUENCY: FREQUENCY: INTERMITTENT

## 2019-05-19 ASSESSMENT — PAIN DESCRIPTION - PROGRESSION: CLINICAL_PROGRESSION: NOT CHANGED

## 2019-05-19 ASSESSMENT — PAIN DESCRIPTION - ONSET: ONSET: ON-GOING

## 2019-05-19 ASSESSMENT — PAIN DESCRIPTION - DESCRIPTORS: DESCRIPTORS: ACHING;DISCOMFORT;SORE

## 2019-05-19 NOTE — PROGRESS NOTES
EOB sba with LE supported by stool    Pt performed therapeutic exercise of the following:   Small PROM L hip slr and abduction and ankle. AAROM R hip, knee, SLR    Patient education  Patient educated on function. Patient response to education:   Pt verbalized understanding Pt demonstrated skill Pt requires further education in this area   yes yes yes     Additional Comments:  Patient was attempted to be seen yesterday, patient had bleeding from distal port of external fixator. Nursing notified and treatment was held. Today per nursing dressing and elastic bandage was changed and patient is cleared for PT. Deysi An Patient presents seated up in bed. Pt reports his right leg was very sore from spivot transfer at previous rx and with no movement no co right leg pain today. He reports pain in B shoulders today from assisting with sliding up in bed previous days with aides. Patient was soiled, he was rolled to be cleaned. Patient c/o dizziness and lightheaded with supine/sit which pasted as he sat. Patient required cuing for hand placement for slide board to R side transfer to [facility]. Patient remained seated for 1 hour 45 minutes and therapist returned to transfer patient to bed. Was difficult transfer due to bed higher then chair and pt not providing any assist.  Would try to use 3rd person transferring back from chair to the bed with transfer board . Discussed with supervising therapist that due to pain in his right leg would use transfer board to assist with getting pt into the chair. Will need to discuss with evaluating therapist.    Pt was left seated up in bed with call light left by patient. Chair/bed alarm: yes      Pt is not making progress toward established Physical Therapy goals. Continue with physical therapy current plan of care. Time in  815   Time out  0900    Time in  1045  Time out   Salvador Go [id number].     Sam Nunez, [address]

## 2019-05-19 NOTE — PROGRESS NOTES
Rolando Dela Cruz is a 80 y.o. male patient. Patient resting comfortably.     Current Facility-Administered Medications   Medication Dose Route Frequency Provider Last Rate Last Dose    sodium chloride flush 0.9 % injection 10 mL  10 mL Intravenous 2 times per day Levonne Balk, DO   10 mL at 05/18/19 2026    sodium chloride flush 0.9 % injection 10 mL  10 mL Intravenous PRN Levonne Balk, DO        oxyCODONE-acetaminophen (PERCOCET) 5-325 MG per tablet 1 tablet  1 tablet Oral Q4H PRN Levonne Balk, DO   1 tablet at 05/17/19 2055    Or    oxyCODONE-acetaminophen (PERCOCET) 5-325 MG per tablet 2 tablet  2 tablet Oral Q4H PRN Levonne Balk, DO   2 tablet at 05/18/19 2334    morphine (PF) injection 1 mg  1 mg Intravenous Q3H PRN Levonne Balk, DO        Or    morphine (PF) injection 2 mg  2 mg Intravenous Q3H PRN Levonne Balk, DO        docusate sodium (COLACE) capsule 100 mg  100 mg Oral BID Levonne Balk, DO   100 mg at 05/18/19 2026    magnesium hydroxide (MILK OF MAGNESIA) 400 MG/5ML suspension 30 mL  30 mL Oral Daily PRN Levonne Balk, DO        bisacodyl (DULCOLAX) suppository 10 mg  10 mg Rectal Daily PRN Levonne Balk, DO        [facility]) injection 4 mg  4 mg Intravenous Q6H PRN Levonne Balk, DO        enoxaparin (LOVENOX) injection 30 mg  30 mg Subcutaneous BID Levonne Balk, DO   30 mg at 05/18/19 2026    atorvastatin (LIPITOR) tablet 40 mg  40 mg Oral Daily Kelsey Massey MD   40 mg at 05/18/19 1835    vitamin B and C (TOTAL B-C) 1 tablet  1 tablet Oral Daily Kelsey Massey MD   1 tablet at 05/18/19 0245    finasteride (PROSCAR) tablet 5 mg  5 mg Oral Daily Kelsey Massey MD   5 mg at 05/18/19 3821    metoprolol tartrate (LOPRESSOR) tablet 25 mg  25 mg Oral BID Kelsey Massey MD   25 mg at 05/18/19 2026    multivitamin 1 tablet  1 tablet Oral Daily Kelsey Massey MD   1 tablet at 05/18/19 5145    niacin extended release capsule 500 mg  500 mg Oral Nightly Mallory Cones Samantha Kirkland MD   500 mg at 05/18/19 2026    sodium chloride (OCEAN, BABY AYR) 0.65 % nasal spray 1 spray  1 spray Nasal Q4H PRN Sandee Lombardi MD        tamsulosin [facility]) capsule 0.4 mg  0.4 mg Oral Daily Sandee Lombardi MD   0.4 mg at 05/18/19 6164     No Known Allergies  Active Problems:    Closed displaced spiral fracture of shaft of left tibia    Falls, initial encounter  Resolved Problems:    * No resolved hospital problems. *    Blood pressure (!) 154/72, pulse 80, temperature 98.3 °F (36.8 °C), temperature source Temporal, resp. rate 18, height 6' 1\" (1.854 m), weight 200 lb (90.7 kg), SpO2 94 %. Subjective:  Symptoms:  Stable. Diet:  Adequate intake. Activity level: Impaired due to pain. Pain:  He complains of pain that is mild. Objective:  General Appearance:  Comfortable. Vital signs: (most recent): Blood pressure (!) 154/72, pulse 80, temperature 98.3 °F (36.8 °C), temperature source Temporal, resp. rate 18, height 6' 1\" (1.854 m), weight 200 lb (90.7 kg), SpO2 94 %. No fever. Lungs:  Normal effort and normal respiratory rate. Breath sounds clear to auscultation. Heart: Normal rate. Regular rhythm. S1 normal and S2 normal.      Assessment:  (Left tibia and fibular fracture  Fall  CAD  HTN  Hyperlipidemia    ). Plan:   (Stable after surgery   Monitor labs - WBC elevated but consistent and around his baseline as he has CLL. Continue meds. Rehab evaluation).        Sandee Lombardi MD  5/19/2019

## 2019-05-19 NOTE — PLAN OF CARE
Problem: Pain:  Goal: Pain level will decrease  Description  Pain level will decrease  Outcome: Met This Shift     Problem: Pain:  Goal: Control of acute pain  Description  Control of acute pain  Outcome: Met This Shift     Problem: Falls - Risk of:  Goal: Will remain free from falls  Description  Will remain free from falls  Outcome: Met This Shift     Problem: Risk for Impaired Skin Integrity  Goal: Tissue integrity - skin and mucous membranes  Description  Structural intactness and normal physiological function of skin and  mucous membranes.   Outcome: Met This Shift

## 2019-05-20 PROCEDURE — 6360000002 HC RX W HCPCS: Performed by: STUDENT IN AN ORGANIZED HEALTH CARE EDUCATION/TRAINING PROGRAM

## 2019-05-20 PROCEDURE — 2580000003 HC RX 258: Performed by: STUDENT IN AN ORGANIZED HEALTH CARE EDUCATION/TRAINING PROGRAM

## 2019-05-20 PROCEDURE — 97110 THERAPEUTIC EXERCISES: CPT

## 2019-05-20 PROCEDURE — 1200000000 HC SEMI PRIVATE

## 2019-05-20 PROCEDURE — 6370000000 HC RX 637 (ALT 250 FOR IP): Performed by: FAMILY MEDICINE

## 2019-05-20 PROCEDURE — 97535 SELF CARE MNGMENT TRAINING: CPT

## 2019-05-20 PROCEDURE — 97530 THERAPEUTIC ACTIVITIES: CPT

## 2019-05-20 PROCEDURE — 6370000000 HC RX 637 (ALT 250 FOR IP): Performed by: STUDENT IN AN ORGANIZED HEALTH CARE EDUCATION/TRAINING PROGRAM

## 2019-05-20 RX ADMIN — TAMSULOSIN HYDROCHLORIDE 0.4 MG: 0.4 CAPSULE ORAL at 08:13

## 2019-05-20 RX ADMIN — MULTIVITAMIN TABLET 1 TABLET: TABLET at 08:16

## 2019-05-20 RX ADMIN — METOPROLOL TARTRATE 25 MG: 25 TABLET, FILM COATED ORAL at 08:13

## 2019-05-20 RX ADMIN — METOPROLOL TARTRATE 25 MG: 25 TABLET, FILM COATED ORAL at 20:32

## 2019-05-20 RX ADMIN — ENOXAPARIN SODIUM 30 MG: 30 INJECTION SUBCUTANEOUS at 20:23

## 2019-05-20 RX ADMIN — Medication 500 MG: at 20:23

## 2019-05-20 RX ADMIN — ENOXAPARIN SODIUM 30 MG: 30 INJECTION SUBCUTANEOUS at 08:14

## 2019-05-20 RX ADMIN — Medication 10 ML: at 08:18

## 2019-05-20 RX ADMIN — VITAMIN C 1 TABLET: TAB at 08:16

## 2019-05-20 RX ADMIN — OXYCODONE HYDROCHLORIDE AND ACETAMINOPHEN 2 TABLET: 5; 325 TABLET ORAL at 02:31

## 2019-05-20 RX ADMIN — Medication 10 ML: at 20:23

## 2019-05-20 RX ADMIN — FINASTERIDE 5 MG: 5 TABLET, FILM COATED ORAL at 08:16

## 2019-05-20 RX ADMIN — ATORVASTATIN CALCIUM 40 MG: 40 TABLET, FILM COATED ORAL at 08:16

## 2019-05-20 ASSESSMENT — PAIN DESCRIPTION - FREQUENCY: FREQUENCY: INTERMITTENT

## 2019-05-20 ASSESSMENT — PAIN DESCRIPTION - PAIN TYPE: TYPE: SURGICAL PAIN

## 2019-05-20 ASSESSMENT — PAIN SCALES - GENERAL
PAINLEVEL_OUTOF10: 0
PAINLEVEL_OUTOF10: 7

## 2019-05-20 ASSESSMENT — PAIN DESCRIPTION - ONSET: ONSET: ON-GOING

## 2019-05-20 ASSESSMENT — PAIN DESCRIPTION - PROGRESSION: CLINICAL_PROGRESSION: NOT CHANGED

## 2019-05-20 ASSESSMENT — PAIN DESCRIPTION - DESCRIPTORS: DESCRIPTORS: ACHING;DISCOMFORT;SORE

## 2019-05-20 ASSESSMENT — PAIN DESCRIPTION - ORIENTATION: ORIENTATION: LEFT

## 2019-05-20 ASSESSMENT — PAIN DESCRIPTION - LOCATION: LOCATION: LEG

## 2019-05-20 NOTE — PROGRESS NOTES
Physical Therapy    Fac[facility]/Department: [facility]  NAME: Graeme Rosales  : 1934  MRN: [id number]. Jesusvasu Gabriella Date of Service: 2019       Name: Graeme Rosales  : 1934  MRN: [id number]    Date of Service: 2019    Evaluating PT:  Alyson Stevenson, PT, DPT [id number]    Room #:  6520/5942-Q  Diagnosis:  L Proximal Tibial Shaft Fx  PMHx:  CAD, HTN, HLD  Precautions:  NWB LLE; Elevate ex- fix; Falls   Equipment Needs:      Pt lives alone with a caregiver 7x per week in a split level home with stair glide to enter. Bed is on  1st  floor and bath is on 1st floor. Pt reports using manual w/c for primary mode of mobility, and performs stand pivot transfer with or without ww. Initial Evaluation  Date:  Treatment  19 Short Term/ Long Term   Goals   AM-PAC 6 Clicks /    Was pt agreeable to Eval/treatment? Yes Yes    Does pt have pain? /10  shoulders B  6/10  Right ant thigh 7/10  Left le with function /10    Bed Mobility  Rolling: ModA  Supine to sit: ModA  Sit to supine: NT  Scooting: Mod A  Rolling: Jamaal  Assist with L leg   Supine to sit:  NT    Sit to supine:  Min assist for holding pt L Leg   Scooting:  Up in bed max 2 Rolling: SBA  Supine to sit: SBA  Sit to supine: SBA  Scooting: SBA   Transfers Sit to stand: MaxA x 2  Stand to sit: MaxA x 2  Squat pivot: MaxA x 2  Sit to stand: nt  -    Stand to sit: nt  Slideboard chair to WC: Min A with L LE supported on stool level transfer to L.   Required board placement  With cues for technique  Sit to stand: SBA Using least restrictive device  Stand to sit: SBA Using least restrictive device  Stand pivot: SBA using Using least restrictive device   Slideboard transfer: SBA   Ambulation    NT NT NA   Stair negotiation: ascended and descended  NT NT NA   ROM BUE:  WNL  Knee spanning ex fix to LLE (ankle ROM WNL)  WNL RLE     Strength BLE:   RLE 4-/5 grossly  LLE Deferred due to ex fix  Improve MMT by 1/5    Balance Sitting EOB:  Jamaal  Dynamic Standing:  MaxA x 2 Sitting eob sba with Le supported by stool  Dynamic standing NT Sitting EOB:  SBA  Dynamic Standing:  SBA     Pt is alert and oriented x4    Balance: Static seated EOB sba with LE supported by stool    Pt performed therapeutic exercise of the following:    Function       Patient education  Patient educated on shifting weight for transfer board placement in preparation of transfer. Patient response to education:   Pt verbalized understanding Pt demonstrated skill Pt requires further education in this area   yes yes yes     Additional Comments:   Pt did well with transfer using B UE to assist with lateral transfer with scooting. Pt required decrease assist with sit to supine. Requires assist with external fixator and L LE>         Chair/bed alarm: yes      Pt making good progress toward established Physical Therapy goals. Continue with physical therapy current plan of care.     Time  255 to [address], [address]

## 2019-05-20 NOTE — PROGRESS NOTES
Vesna [place] is a 80 y.o. male patient. Patient resting comfortably.     Current Facility-Administered Medications   Medication Dose Route Frequency Provider Last Rate Last Dose    sodium chloride flush 0.9 % injection 10 mL  10 mL Intravenous 2 times per day Juan Benny, DO   10 mL at 05/19/19 2032    sodium chloride flush 0.9 % injection 10 mL  10 mL Intravenous PRN Juan Benny, DO        oxyCODONE-acetaminophen (PERCOCET) 5-325 MG per tablet 1 tablet  1 tablet Oral Q4H PRN Juan Benny, DO   1 tablet at 05/17/19 2055    Or    oxyCODONE-acetaminophen (PERCOCET) 5-325 MG per tablet 2 tablet  2 tablet Oral Q4H PRN Juan Benny, DO   2 tablet at 05/20/19 0231    morphine (PF) injection 1 mg  1 mg Intravenous Q3H PRN Juan Benny, DO        Or    morphine (PF) injection 2 mg  2 mg Intravenous Q3H PRN Juan Benny, DO        docusate sodium (COLACE) capsule 100 mg  100 mg Oral BID Juan Benny, DO   100 mg at 05/19/19 9923    magnesium hydroxide (MILK OF MAGNESIA) 400 MG/5ML suspension 30 mL  30 mL Oral Daily PRN Juan Benny, DO        bisacodyl (DULCOLAX) suppository 10 mg  10 mg Rectal Daily PRN Juan Benny, DO        [facility]ron [facility]) injection 4 mg  4 mg Intravenous Q6H PRN Juan Benny, DO        enoxaparin (LOVENOX) injection 30 mg  30 mg Subcutaneous BID Juan Benny, DO   30 mg at 05/19/19 2033    atorvastatin (LIPITOR) tablet 40 mg  40 mg Oral Daily Gia Arias MD   40 mg at 05/19/19 0920    vitamin B and C (TOTAL B-C) 1 tablet  1 tablet Oral Daily Gia Arias MD   1 tablet at 05/19/19 3572    finasteride (PROSCAR) tablet 5 mg  5 mg Oral Daily Gia Arias MD   5 mg at 05/19/19 0920    metoprolol tartrate (LOPRESSOR) tablet 25 mg  25 mg Oral BID Gia Arias MD   25 mg at 05/19/19 2033    multivitamin 1 tablet  1 tablet Oral Daily Gia Arias MD   1 tablet at 05/19/19 0920    niacin extended release capsule 500 mg  500 mg Oral Nightly Yuriy Shear Pat Tabares MD   500 mg at 05/19/19 2033    sodium chloride (OCEAN, BABY AYR) 0.65 % nasal spray 1 spray  1 spray Nasal Q4H PRN Eneida Mcnulty MD        tamsulosin [facility]) capsule 0.4 mg  0.4 mg Oral Daily Enieda Mcnulty MD   0.4 mg at 05/19/19 0920     No Known Allergies  Active Problems:    Closed displaced spiral fracture of shaft of left tibia    Falls, initial encounter  Resolved Problems:    * No resolved hospital problems. *    Blood pressure 127/62, pulse 80, temperature 99 °F (37.2 °C), temperature source Temporal, resp. rate 18, height 6' 1\" (1.854 m), weight 200 lb (90.7 kg), SpO2 93 %. Subjective:  Symptoms:  Stable. Diet:  Adequate intake. Activity level: Impaired due to pain. Pain:  He complains of pain that is mild. Objective:  General Appearance:  Comfortable. Vital signs: (most recent): Blood pressure 127/62, pulse 80, temperature 99 °F (37.2 °C), temperature source Temporal, resp. rate 18, height 6' 1\" (1.854 m), weight 200 lb (90.7 kg), SpO2 93 %. No fever. Lungs:  Normal effort and normal respiratory rate. Breath sounds clear to auscultation. Heart: Normal rate. Regular rhythm. S1 normal and S2 normal.      Assessment:  (Left tibia and fibular fracture  Fall  CAD  HTN  Hyperlipidemia    ). Plan:   (Stable after surgery   Monitor labs - WBC elevated but consistent and around his baseline as he has CLL. Continue meds. Rehab evaluation).        Eneida Mcnulty MD  5/20/2019

## 2019-05-20 NOTE — PROGRESS NOTES
Physical Therapy    Facility/Department: [facility]  PT treatment  NAME: Sarah Enciso  : 1934  MRN: [id number]. Annalise Cuevas Date of Service: 2019       Name: Sarah Enciso  : 1934  MRN: [id number]    Date of Service: 2019    Evaluating PT:  Maddie Staples, PT, DPT [id number]    Room #:  [address] [place] [address]  Diagnosis:  L Proximal Tibial Shaft Fx  PMHx:  CAD, HTN, HLD  Precautions:  NWB LLE; Elevate ex- fix; Falls   Equipment Needs:      Pt lives alone with a caregiver 7x per week in a split level home with stair glide to enter. Bed is on  1st  floor and bath is on 1st floor. Pt reports using manual w/c for primary mode of mobility, and performs stand pivot transfer with or without ww. Initial Evaluation  Date:  Treatment  19 Short Term/ Long Term   Goals   AM-PAC 6 Clicks 47/88 68/58    Was pt agreeable to Eval/treatment? Yes Yes    Does pt have pain? 2/10  shoulders B  6/10  Right ant thigh 7/10  Left le with function 7/10    Bed Mobility  Rolling: ModA  Supine to sit: ModA  Sit to supine: NT  Scooting:  Mod A  Rolling: Jamaal  Supine to sit: hob  Slightly elevated  ModA  Sit to supine: NT  Scooting: Min A along EOB Rolling: SBA  Supine to sit: SBA  Sit to supine: SBA  Scooting: SBA   Transfers Sit to stand: MaxA x 2  Stand to sit: MaxA x 2  Squat pivot: MaxA x 2  Sit to stand: nt  -    Stand to sit: nt  Slideboard bed to WC: Min A with L LE supported on stool level transfer to R  Required board placement  With cues for technique  Sit to stand: SBA Using least restrictive device  Stand to sit: SBA Using least restrictive device  Stand pivot: SBA using Using least restrictive device   Slideboard transfer: SBA   Ambulation    NT NT NA   Stair negotiation: ascended and descended  NT NT NA   ROM BUE:  WNL  Knee spanning ex fix to LLE (ankle ROM WNL)  WNL RLE     Strength BLE:   RLE 4-/5 grossly  LLE Deferred due to ex fix  Improve MMT by 1/5    Balance Sitting EOB:  Jamaal  Dynamic Standing:  MaxA x 2 Sitting eob sba with Le supported by stool  Dynamic standing NT Sitting EOB:  SBA  Dynamic Standing:  SBA     Pt is alert and oriented x4    Balance: Static seated EOB sba with LE supported by stool    Pt performed therapeutic exercise of the following:    Small PROM L hip slr and abduction. L ankle pumps. AAROM quad sets and ankle pumps with heels offloaded. Patient education  Patient educated on scooting on EOB and slide board transfer. Patient response to education:   Pt verbalized understanding Pt demonstrated skill Pt requires further education in this area   yes yes yes     Additional Comments:  Patient supine in bed, agreeable to rx. Patient demonstrated improved effort and decreased assist with function and transfers today. Patient left seated in chair with LE elevated  with call light left by patient. Chair/bed alarm: yes      Pt making good progress toward established Physical Therapy goals. Continue with physical therapy current plan of care.     Time 1259 to 1312   Time 1335 to [address] [address], [address]

## 2019-05-21 ENCOUNTER — PREP FOR PROCEDURE (OUTPATIENT)
Dept: ORTHOPEDIC SURGERY | Age: 84
End: 2019-05-21

## 2019-05-21 ENCOUNTER — TELEPHONE (OUTPATIENT)
Dept: ORTHOPEDIC SURGERY | Age: 84
End: 2019-05-21

## 2019-05-21 VITALS
HEART RATE: 88 BPM | TEMPERATURE: 99 F | HEIGHT: 73 IN | RESPIRATION RATE: 16 BRPM | WEIGHT: 200 LBS | BODY MASS INDEX: 26.51 KG/M2 | DIASTOLIC BLOOD PRESSURE: 68 MMHG | OXYGEN SATURATION: 94 % | SYSTOLIC BLOOD PRESSURE: 138 MMHG

## 2019-05-21 PROCEDURE — 2580000003 HC RX 258: Performed by: STUDENT IN AN ORGANIZED HEALTH CARE EDUCATION/TRAINING PROGRAM

## 2019-05-21 PROCEDURE — 6370000000 HC RX 637 (ALT 250 FOR IP): Performed by: FAMILY MEDICINE

## 2019-05-21 PROCEDURE — 6370000000 HC RX 637 (ALT 250 FOR IP): Performed by: STUDENT IN AN ORGANIZED HEALTH CARE EDUCATION/TRAINING PROGRAM

## 2019-05-21 PROCEDURE — 6360000002 HC RX W HCPCS: Performed by: STUDENT IN AN ORGANIZED HEALTH CARE EDUCATION/TRAINING PROGRAM

## 2019-05-21 RX ORDER — SODIUM CHLORIDE 0.9 % (FLUSH) 0.9 %
10 SYRINGE (ML) INJECTION EVERY 12 HOURS SCHEDULED
Status: CANCELLED | OUTPATIENT
Start: 2019-05-21

## 2019-05-21 RX ORDER — SODIUM CHLORIDE, SODIUM LACTATE, POTASSIUM CHLORIDE, CALCIUM CHLORIDE 600; 310; 30; 20 MG/100ML; MG/100ML; MG/100ML; MG/100ML
INJECTION, SOLUTION INTRAVENOUS CONTINUOUS
Status: CANCELLED | OUTPATIENT
Start: 2019-05-21

## 2019-05-21 RX ORDER — CEFAZOLIN SODIUM 2 G/50ML
2 SOLUTION INTRAVENOUS
Status: CANCELLED | OUTPATIENT
Start: 2019-05-21 | End: 2019-05-21

## 2019-05-21 RX ORDER — SODIUM CHLORIDE 0.9 % (FLUSH) 0.9 %
10 SYRINGE (ML) INJECTION PRN
Status: CANCELLED | OUTPATIENT
Start: 2019-05-21

## 2019-05-21 RX ADMIN — ENOXAPARIN SODIUM 30 MG: 30 INJECTION SUBCUTANEOUS at 08:15

## 2019-05-21 RX ADMIN — VITAMIN C 1 TABLET: TAB at 08:15

## 2019-05-21 RX ADMIN — ATORVASTATIN CALCIUM 40 MG: 40 TABLET, FILM COATED ORAL at 08:15

## 2019-05-21 RX ADMIN — DOCUSATE SODIUM 100 MG: 100 CAPSULE, LIQUID FILLED ORAL at 08:14

## 2019-05-21 RX ADMIN — METOPROLOL TARTRATE 25 MG: 25 TABLET, FILM COATED ORAL at 08:15

## 2019-05-21 RX ADMIN — Medication 10 ML: at 08:14

## 2019-05-21 RX ADMIN — MULTIVITAMIN TABLET 1 TABLET: TABLET at 08:15

## 2019-05-21 RX ADMIN — FINASTERIDE 5 MG: 5 TABLET, FILM COATED ORAL at 08:15

## 2019-05-21 ASSESSMENT — PAIN SCALES - GENERAL: PAINLEVEL_OUTOF10: 0

## 2019-05-21 NOTE — CARE COORDINATION
Jadaannie Lavelle has received insurance authorization. The pt is scheduled to be transported at 6:00.  The facility, nursing, family and patient notified of the time

## 2019-05-21 NOTE — DISCHARGE SUMMARY
Physician Discharge Summary     Patient ID:  Cameron Davila  [id number]  37 y.o.  1934    Admit date: 5/15/2019    Discharge date and time: 5/21/2019     Admitting Physician: Carly Krause MD     Discharge Physician: Kaiser Ramon MD    Admission Diagnoses: Closed displaced spiral fracture of shaft of left tibia [S82.242A]  Falls, initial encounter [W19. XXXA]    Discharge Diagnoses: SAME    Admission Condition: fair    Discharged Condition: stable    Indication for Admission: Fall, left tibial fracture    Hospital Course: Patient admitted for above and had surgery per Ortho. Due to overall weakness, agreeable to rehab. Consults: orthopedic surgery    Significant Diagnostic Studies: imaging    Treatments: as above    Discharge Exam:  See today's note    Disposition: SNF    In process/preliminary results:  Outstanding Order Results     No orders found from 4/16/2019 to 5/16/2019. Patient Instructions:   Current Discharge Medication List      START taking these medications    Details   enoxaparin (LOVENOX) 30 MG/0.3ML injection Inject 0.3 mLs into the skin 2 times daily for 28 days  Qty: 16.8 mL, Refills: 0         CONTINUE these medications which have CHANGED    Details   HYDROcodone-acetaminophen (NORCO) 5-325 MG per tablet Take 1 tablet by mouth every 6 hours as needed for Pain for up to 7 days. Intended supply: 7 days.  Take lowest dose possible to manage pain  Qty: 28 tablet, Refills: 0    Comments: Reduce doses taken as pain becomes manageable  Associated Diagnoses: Closed fracture of proximal end of tibia, unspecified fracture morphology, unspecified laterality, initial encounter         CONTINUE these medications which have NOT CHANGED    Details   docusate sodium (COLACE) 100 MG capsule Take 100 mg by mouth nightly      niacin 500 MG extended release capsule Take 500 mg by mouth nightly      coenzyme Q10 100 MG CAPS capsule Take 100 mg by mouth daily      Cholecalciferol (VITAMIN D3) 5000 units TABS Take by mouth      tamsulosin (FLOMAX) 0.4 MG capsule Take 0.4 mg by mouth daily      finasteride (PROSCAR) 5 MG tablet Take 1 tablet by mouth daily      Multiple Vitamins-Minerals (MENS 50+ MULTI VITAMIN/MIN) TABS Take 1 tablet by mouth daily      b complex vitamins capsule Take 1 capsule by mouth daily Indications: with vit c       metoprolol (LOPRESSOR) 25 MG tablet Take 1 tablet by mouth 2 times daily.   Qty: 60 tablet, Refills: 0      atorvastatin (LIPITOR) 80 MG tablet Take 40 mg by mouth daily          STOP taking these medications       Nutritional Supplements (Carolynne Klinefelter) PACK Comments:   Reason for Stopping:         Nutritional Supplements (ENSURE ACTIVE HIGH PROTEIN) LIQD Comments:   Reason for Stopping:         sodium chloride (OCEAN) 0.65 % nasal spray Comments:   Reason for Stopping:         traMADol (ULTRAM) 50 MG tablet Comments:   Reason for Stopping:         aspirin 81 MG tablet Comments:   Reason for Stopping:             Activity: activity as tolerated  Diet: cardiac diet  Wound Care: as directed    Follow-up with Nursing home    Signed:  Misha Faye MD  5/21/2019  10:37 AM

## 2019-05-21 NOTE — PROGRESS NOTES
Adry Yang MD   500 mg at 05/20/19 2023    sodium chloride (OCEAN, BABY AYR) 0.65 % nasal spray 1 spray  1 spray Nasal Q4H PRN Alexandra Olivares MD        tamsulosin [facility]) capsule 0.4 mg  0.4 mg Oral Daily Alexandra Olivares MD   0.4 mg at 05/20/19 0813     No Known Allergies  Active Problems:    Closed displaced spiral fracture of shaft of left tibia    Falls, initial encounter  Resolved Problems:    * No resolved hospital problems. *    Blood pressure 132/63, pulse 79, temperature 98.7 °F (37.1 °C), temperature source Temporal, resp. rate 16, height 6' 1\" (1.854 m), weight 200 lb (90.7 kg), SpO2 94 %. Subjective:  Symptoms:  Stable. Diet:  Adequate intake. Activity level: Impaired due to pain. Pain:  He complains of pain that is mild. Objective:  General Appearance:  Comfortable. Vital signs: (most recent): Blood pressure 132/63, pulse 79, temperature 98.7 °F (37.1 °C), temperature source Temporal, resp. rate 16, height 6' 1\" (1.854 m), weight 200 lb (90.7 kg), SpO2 94 %. No fever. Lungs:  Normal effort and normal respiratory rate. Breath sounds clear to auscultation. Heart: Normal rate. Regular rhythm. S1 normal and S2 normal.      Assessment:  (Left tibia and fibular fracture  Fall  CAD  HTN  Hyperlipidemia    ). Plan:   (Stable after surgery   Monitor labs - WBC elevated but consistent and around his baseline as he has CLL. Continue meds. Placement when ok with surgery and bed available. ).        Alexandra Olivares MD  5/21/2019

## 2019-05-21 NOTE — TELEPHONE ENCOUNTER
[facility] #[phone] and spoke to Sharon Squires, [address] [facility]. [facility] but will be discharged very shortly today and will be admitted to [facility]. Gilbert Pointer that patient would be having surgery on 5-23-19 @ 3pm @ [facility] and would need transportation to surgery, arrival time at hospital is 1pm. She verbalized understanding and said she would give message to their transportation department. Pre-surgery instructions will be included with patients hospital discharge paperwork from today, but instructed to call the office back with any questions.

## 2019-05-22 ENCOUNTER — TELEPHONE (OUTPATIENT)
Dept: ORTHOPEDIC SURGERY | Age: 84
End: 2019-05-22

## 2019-05-22 RX ORDER — BISACODYL 10 MG
10 SUPPOSITORY, RECTAL RECTAL PRN
COMMUNITY
End: 2022-01-01

## 2019-05-22 RX ORDER — ACETAMINOPHEN 325 MG/1
650 TABLET ORAL EVERY 4 HOURS PRN
COMMUNITY
End: 2022-01-01

## 2019-05-22 NOTE — PROGRESS NOTES
An 36 PRE-ADMISSION TESTING GENERAL INSTRUCTIONS- [facility]-phone number:[phone]    GENERAL INSTRUCTIONS  [x] Antibacterial Soap shower Night before and/or AM of Surgery  [x] Nothing by mouth after 7 AM, including gum, candy, mints, or water. [x] You may brush your teeth, gargle, but do NOT swallow water. [x]No smoking, chewing tobacco, illegal drugs, or alcohol within 24 hours of your surgery. [x] Jewelry, valuables or body piercing's should not be brought to the hospital. All body and/or tongue piercing's must be removed prior to arriving to hospital.  ALL hair pins must be removed. [x] Do not wear makeup, lotions, powders, deodorant. Nail polish as directed by the nurse. [x] Arrange transportation with a responsible adult  to and from the hospital. If you do not have a responsible adult  to transport you, you will need to make arrangements with a medical transportation company (i.e. [employer]. A Uber/taxi/bus is not appropriate unless you are accompanied by a responsible adult ). Arrange for someone to be with you for the remainder of the day and for 24 hours after your procedure due to having had anesthesia. Who will be your  for transportation? __COMFORT CARAVAN________________   Who will be staying with you for 24 hrs after your procedure?___COMFORT CARAVAN_______________  [x] Bring insurance card and photo ID. [x] Bring copy of living will or healthcare power of  papers to be placed in your electronic record. PARKING INSTRUCTIONS:   [x] Arrival Time:__1300___________  · [x] Parking lot '\"I\"  is located on [facility] (the corner of [facility]). To enter, press the button and the gate will lift. A free token will be provided to exit the lot. EDUCATION INSTRUCTIONS:        [x] Incentive Spirometry,coughing & deep breathing exercises reviewed.      [x]Medication information sheet(s)   [x]Fluoroscopy-Xray used in surgery reviewed with patient. Educational pamphlet placed in chart. [x]Pain: Post-op pain is normal and to be expected. You will be asked to rate your pain from 0-10(a zero is not acceptable-education is needed). Your post-op pain goal is:4  [x] Ask your nurse for your pain medication. MEDICATION INSTRUCTIONS:   [x]Bring a complete list of your medications, please write the last time you took the medicine, give this list to the nurse. [x] Take the following medications the morning of surgery with 1-2 ounces of water: SEE LIST  [x] Stop herbal supplements and vitamins 5 days before your surgery. [x] Follow physician instructions regarding any blood thinners you may be taking. WHAT TO EXPECT:  [x] The day of surgery you will be greeted and checked in by the [employer].  In addition, you will be registered in the [place] by a Patient Access Representative. Please bring your photo ID and insurance card. A nurse will greet you in accordance to the time you are needed in the pre-op area to prepare you for surgery. Please do not be discouraged if you are not greeted in the order you arrive as there are many variables that are involved in patient preparation. Your patience is greatly appreciated as you wait for your nurse. Please bring in items such as: books, magazines, newspapers, electronics, or any other items  to occupy your time in the waiting area. [x]  Delays may occur with surgery and staff will make a sincere effort to keep you informed of delays. If any delays occur with your procedure, we apologize ahead of time for your inconvenience as we recognize the value of your time.

## 2019-05-22 NOTE — TELEPHONE ENCOUNTER
I called [facility] @ 3:25pm and spoke to Fermín Pacheco LPN who was the nurse on patients floor. Explained to him the surgery time for tomorrow was changed from 3 pm to 2 pm, and patient needs to arrive at the hospital at 12 pm instead of 1pm. Fermín Pacheco verbalized understanding and said he would give the message to the  who had already left for the day.

## 2019-05-23 ENCOUNTER — ANESTHESIA EVENT (OUTPATIENT)
Dept: OPERATING ROOM | Age: 84
DRG: 494 | End: 2019-05-23
Payer: MEDICARE

## 2019-05-23 ENCOUNTER — HOSPITAL ENCOUNTER (INPATIENT)
Age: 84
LOS: 2 days | Discharge: NURSING, REHAB OR LONG-TERM CARE | DRG: 494 | End: 2019-05-25
Attending: ORTHOPAEDIC SURGERY | Admitting: ORTHOPAEDIC SURGERY
Payer: MEDICARE

## 2019-05-23 ENCOUNTER — APPOINTMENT (OUTPATIENT)
Dept: GENERAL RADIOLOGY | Age: 84
DRG: 494 | End: 2019-05-23
Attending: ORTHOPAEDIC SURGERY
Payer: MEDICARE

## 2019-05-23 ENCOUNTER — ANESTHESIA (OUTPATIENT)
Dept: OPERATING ROOM | Age: 84
DRG: 494 | End: 2019-05-23
Payer: MEDICARE

## 2019-05-23 VITALS
DIASTOLIC BLOOD PRESSURE: 80 MMHG | RESPIRATION RATE: 12 BRPM | SYSTOLIC BLOOD PRESSURE: 138 MMHG | OXYGEN SATURATION: 100 %

## 2019-05-23 DIAGNOSIS — S82.132A CLOSED FRACTURE OF MEDIAL PORTION OF LEFT TIBIAL PLATEAU, INITIAL ENCOUNTER: ICD-10-CM

## 2019-05-23 PROBLEM — S82.142D: Status: ACTIVE | Noted: 2019-05-23

## 2019-05-23 PROCEDURE — 2720000010 HC SURG SUPPLY STERILE: Performed by: ORTHOPAEDIC SURGERY

## 2019-05-23 PROCEDURE — 6360000002 HC RX W HCPCS

## 2019-05-23 PROCEDURE — 27758 TREATMENT OF TIBIA FRACTURE: CPT | Performed by: ORTHOPAEDIC SURGERY

## 2019-05-23 PROCEDURE — 2580000003 HC RX 258: Performed by: STUDENT IN AN ORGANIZED HEALTH CARE EDUCATION/TRAINING PROGRAM

## 2019-05-23 PROCEDURE — 0QSH04Z REPOSITION LEFT TIBIA WITH INTERNAL FIXATION DEVICE, OPEN APPROACH: ICD-10-PCS | Performed by: ORTHOPAEDIC SURGERY

## 2019-05-23 PROCEDURE — 3209999900 FLUORO FOR SURGICAL PROCEDURES

## 2019-05-23 PROCEDURE — 3600000005 HC SURGERY LEVEL 5 BASE: Performed by: ORTHOPAEDIC SURGERY

## 2019-05-23 PROCEDURE — C1713 ANCHOR/SCREW BN/BN,TIS/BN: HCPCS | Performed by: ORTHOPAEDIC SURGERY

## 2019-05-23 PROCEDURE — 20694 RMVL EXT FIXJ SYS UNDER ANES: CPT | Performed by: ORTHOPAEDIC SURGERY

## 2019-05-23 PROCEDURE — 2580000003 HC RX 258: Performed by: PHYSICIAN ASSISTANT

## 2019-05-23 PROCEDURE — 3700000000 HC ANESTHESIA ATTENDED CARE: Performed by: ORTHOPAEDIC SURGERY

## 2019-05-23 PROCEDURE — 6360000002 HC RX W HCPCS: Performed by: ANESTHESIOLOGY

## 2019-05-23 PROCEDURE — 6360000002 HC RX W HCPCS: Performed by: STUDENT IN AN ORGANIZED HEALTH CARE EDUCATION/TRAINING PROGRAM

## 2019-05-23 PROCEDURE — 7100000001 HC PACU RECOVERY - ADDTL 15 MIN: Performed by: ORTHOPAEDIC SURGERY

## 2019-05-23 PROCEDURE — 3600000015 HC SURGERY LEVEL 5 ADDTL 15MIN: Performed by: ORTHOPAEDIC SURGERY

## 2019-05-23 PROCEDURE — 7100000000 HC PACU RECOVERY - FIRST 15 MIN: Performed by: ORTHOPAEDIC SURGERY

## 2019-05-23 PROCEDURE — 64447 NJX AA&/STRD FEMORAL NRV IMG: CPT | Performed by: ANESTHESIOLOGY

## 2019-05-23 PROCEDURE — 73590 X-RAY EXAM OF LOWER LEG: CPT

## 2019-05-23 PROCEDURE — 2500000003 HC RX 250 WO HCPCS

## 2019-05-23 PROCEDURE — 2709999900 HC NON-CHARGEABLE SUPPLY: Performed by: ORTHOPAEDIC SURGERY

## 2019-05-23 PROCEDURE — 3E0T3BZ INTRODUCTION OF ANESTHETIC AGENT INTO PERIPHERAL NERVES AND PLEXI, PERCUTANEOUS APPROACH: ICD-10-PCS | Performed by: ORTHOPAEDIC SURGERY

## 2019-05-23 PROCEDURE — 3700000001 HC ADD 15 MINUTES (ANESTHESIA): Performed by: ORTHOPAEDIC SURGERY

## 2019-05-23 PROCEDURE — 0QPH05Z REMOVAL OF EXTERNAL FIXATION DEVICE FROM LEFT TIBIA, OPEN APPROACH: ICD-10-PCS | Performed by: ORTHOPAEDIC SURGERY

## 2019-05-23 PROCEDURE — 6370000000 HC RX 637 (ALT 250 FOR IP): Performed by: STUDENT IN AN ORGANIZED HEALTH CARE EDUCATION/TRAINING PROGRAM

## 2019-05-23 PROCEDURE — 1200000000 HC SEMI PRIVATE

## 2019-05-23 DEVICE — SCREW BNE L85MM DIA5MM S STL ST LOK FULL THRD T25 STARDRV: Type: IMPLANTABLE DEVICE | Site: TIBIA | Status: FUNCTIONAL

## 2019-05-23 DEVICE — SCREW BNE L36MM DIA4.5MM PROX CORT TIB S STL ST LOK FULL: Type: IMPLANTABLE DEVICE | Site: TIBIA | Status: FUNCTIONAL

## 2019-05-23 DEVICE — IMPLANTABLE DEVICE: Type: IMPLANTABLE DEVICE | Site: TIBIA | Status: FUNCTIONAL

## 2019-05-23 DEVICE — SCREW BNE L90MM DIA5MM S STL ST LOK FULL THRD T25 STARDRV: Type: IMPLANTABLE DEVICE | Site: TIBIA | Status: FUNCTIONAL

## 2019-05-23 DEVICE — SCREW BNE L36MM DIA5MM S STL ST LOK FULL THRD T25 STARDRV: Type: IMPLANTABLE DEVICE | Site: TIBIA | Status: FUNCTIONAL

## 2019-05-23 DEVICE — SCREW BNE L44MM DIA4.5MM PROX CORT TIB S STL ST LOK FULL: Type: IMPLANTABLE DEVICE | Site: TIBIA | Status: FUNCTIONAL

## 2019-05-23 DEVICE — SCREW BNE L32MM DIA5MM S STL ST LOK FULL THRD T25 STARDRV: Type: IMPLANTABLE DEVICE | Site: TIBIA | Status: FUNCTIONAL

## 2019-05-23 RX ORDER — GLYCOPYRROLATE 1 MG/5 ML
SYRINGE (ML) INTRAVENOUS PRN
Status: DISCONTINUED | OUTPATIENT
Start: 2019-05-23 | End: 2019-05-23 | Stop reason: SDUPTHER

## 2019-05-23 RX ORDER — DIPHENHYDRAMINE HYDROCHLORIDE 50 MG/ML
25 INJECTION INTRAMUSCULAR; INTRAVENOUS EVERY 6 HOURS PRN
Status: DISCONTINUED | OUTPATIENT
Start: 2019-05-23 | End: 2019-05-25 | Stop reason: HOSPADM

## 2019-05-23 RX ORDER — PROPOFOL 10 MG/ML
INJECTION, EMULSION INTRAVENOUS PRN
Status: DISCONTINUED | OUTPATIENT
Start: 2019-05-23 | End: 2019-05-23 | Stop reason: SDUPTHER

## 2019-05-23 RX ORDER — CEFAZOLIN SODIUM 2 G/50ML
2 SOLUTION INTRAVENOUS
Status: DISCONTINUED | OUTPATIENT
Start: 2019-05-23 | End: 2019-05-23

## 2019-05-23 RX ORDER — LIDOCAINE HYDROCHLORIDE 20 MG/ML
INJECTION, SOLUTION INTRAVENOUS PRN
Status: DISCONTINUED | OUTPATIENT
Start: 2019-05-23 | End: 2019-05-23 | Stop reason: SDUPTHER

## 2019-05-23 RX ORDER — SODIUM CHLORIDE, SODIUM LACTATE, POTASSIUM CHLORIDE, CALCIUM CHLORIDE 600; 310; 30; 20 MG/100ML; MG/100ML; MG/100ML; MG/100ML
INJECTION, SOLUTION INTRAVENOUS CONTINUOUS
Status: DISCONTINUED | OUTPATIENT
Start: 2019-05-23 | End: 2019-05-23

## 2019-05-23 RX ORDER — SODIUM CHLORIDE 0.9 % (FLUSH) 0.9 %
10 SYRINGE (ML) INJECTION PRN
Status: DISCONTINUED | OUTPATIENT
Start: 2019-05-23 | End: 2019-05-25 | Stop reason: HOSPADM

## 2019-05-23 RX ORDER — SODIUM CHLORIDE 0.9 % (FLUSH) 0.9 %
10 SYRINGE (ML) INJECTION PRN
Status: DISCONTINUED | OUTPATIENT
Start: 2019-05-23 | End: 2019-05-23 | Stop reason: HOSPADM

## 2019-05-23 RX ORDER — ROCURONIUM BROMIDE 10 MG/ML
INJECTION, SOLUTION INTRAVENOUS PRN
Status: DISCONTINUED | OUTPATIENT
Start: 2019-05-23 | End: 2019-05-23 | Stop reason: SDUPTHER

## 2019-05-23 RX ORDER — CEFAZOLIN SODIUM 2 G/50ML
2 SOLUTION INTRAVENOUS EVERY 8 HOURS
Status: COMPLETED | OUTPATIENT
Start: 2019-05-23 | End: 2019-05-24

## 2019-05-23 RX ORDER — MORPHINE SULFATE 2 MG/ML
2 INJECTION, SOLUTION INTRAMUSCULAR; INTRAVENOUS EVERY 4 HOURS PRN
Status: DISCONTINUED | OUTPATIENT
Start: 2019-05-23 | End: 2019-05-25 | Stop reason: HOSPADM

## 2019-05-23 RX ORDER — SODIUM CHLORIDE 0.9 % (FLUSH) 0.9 %
10 SYRINGE (ML) INJECTION EVERY 12 HOURS SCHEDULED
Status: DISCONTINUED | OUTPATIENT
Start: 2019-05-23 | End: 2019-05-23 | Stop reason: HOSPADM

## 2019-05-23 RX ORDER — SODIUM CHLORIDE 0.9 % (FLUSH) 0.9 %
10 SYRINGE (ML) INJECTION EVERY 12 HOURS SCHEDULED
Status: DISCONTINUED | OUTPATIENT
Start: 2019-05-23 | End: 2019-05-25 | Stop reason: HOSPADM

## 2019-05-23 RX ORDER — ROPIVACAINE HYDROCHLORIDE 5 MG/ML
INJECTION, SOLUTION EPIDURAL; INFILTRATION; PERINEURAL
Status: COMPLETED | OUTPATIENT
Start: 2019-05-23 | End: 2019-05-23

## 2019-05-23 RX ORDER — FENTANYL CITRATE 50 UG/ML
INJECTION, SOLUTION INTRAMUSCULAR; INTRAVENOUS PRN
Status: DISCONTINUED | OUTPATIENT
Start: 2019-05-23 | End: 2019-05-23 | Stop reason: SDUPTHER

## 2019-05-23 RX ORDER — ONDANSETRON 2 MG/ML
INJECTION INTRAMUSCULAR; INTRAVENOUS PRN
Status: DISCONTINUED | OUTPATIENT
Start: 2019-05-23 | End: 2019-05-23 | Stop reason: SDUPTHER

## 2019-05-23 RX ORDER — CEFAZOLIN SODIUM 1 G/3ML
INJECTION, POWDER, FOR SOLUTION INTRAMUSCULAR; INTRAVENOUS PRN
Status: DISCONTINUED | OUTPATIENT
Start: 2019-05-23 | End: 2019-05-23 | Stop reason: SDUPTHER

## 2019-05-23 RX ORDER — DOCUSATE SODIUM 100 MG/1
100 CAPSULE, LIQUID FILLED ORAL 2 TIMES DAILY
Status: DISCONTINUED | OUTPATIENT
Start: 2019-05-23 | End: 2019-05-25 | Stop reason: HOSPADM

## 2019-05-23 RX ORDER — DEXAMETHASONE SODIUM PHOSPHATE 10 MG/ML
INJECTION INTRAMUSCULAR; INTRAVENOUS PRN
Status: DISCONTINUED | OUTPATIENT
Start: 2019-05-23 | End: 2019-05-23 | Stop reason: SDUPTHER

## 2019-05-23 RX ORDER — ONDANSETRON 2 MG/ML
4 INJECTION INTRAMUSCULAR; INTRAVENOUS EVERY 6 HOURS PRN
Status: DISCONTINUED | OUTPATIENT
Start: 2019-05-23 | End: 2019-05-25 | Stop reason: HOSPADM

## 2019-05-23 RX ORDER — HYDROCODONE BITARTRATE AND ACETAMINOPHEN 5; 325 MG/1; MG/1
1 TABLET ORAL EVERY 4 HOURS PRN
Status: DISCONTINUED | OUTPATIENT
Start: 2019-05-23 | End: 2019-05-25 | Stop reason: HOSPADM

## 2019-05-23 RX ORDER — CEFAZOLIN SODIUM 2 G/50ML
2 SOLUTION INTRAVENOUS
Status: DISCONTINUED | OUTPATIENT
Start: 2019-05-23 | End: 2019-05-23 | Stop reason: SDUPTHER

## 2019-05-23 RX ORDER — ACETAMINOPHEN 325 MG/1
650 TABLET ORAL EVERY 4 HOURS PRN
Status: DISCONTINUED | OUTPATIENT
Start: 2019-05-23 | End: 2019-05-25 | Stop reason: HOSPADM

## 2019-05-23 RX ORDER — NEOSTIGMINE METHYLSULFATE 1 MG/ML
INJECTION, SOLUTION INTRAVENOUS PRN
Status: DISCONTINUED | OUTPATIENT
Start: 2019-05-23 | End: 2019-05-23 | Stop reason: SDUPTHER

## 2019-05-23 RX ORDER — ROPIVACAINE HYDROCHLORIDE 5 MG/ML
40 INJECTION, SOLUTION EPIDURAL; INFILTRATION; PERINEURAL
Status: COMPLETED | OUTPATIENT
Start: 2019-05-23 | End: 2019-05-23

## 2019-05-23 RX ORDER — HYDROCODONE BITARTRATE AND ACETAMINOPHEN 5; 325 MG/1; MG/1
2 TABLET ORAL EVERY 4 HOURS PRN
Status: DISCONTINUED | OUTPATIENT
Start: 2019-05-23 | End: 2019-05-25 | Stop reason: HOSPADM

## 2019-05-23 RX ORDER — MORPHINE SULFATE 4 MG/ML
4 INJECTION, SOLUTION INTRAMUSCULAR; INTRAVENOUS EVERY 4 HOURS PRN
Status: DISCONTINUED | OUTPATIENT
Start: 2019-05-23 | End: 2019-05-25 | Stop reason: HOSPADM

## 2019-05-23 RX ORDER — FENTANYL CITRATE 50 UG/ML
50 INJECTION, SOLUTION INTRAMUSCULAR; INTRAVENOUS ONCE
Status: COMPLETED | OUTPATIENT
Start: 2019-05-23 | End: 2019-05-23

## 2019-05-23 RX ORDER — MIDAZOLAM HYDROCHLORIDE 1 MG/ML
0.5 INJECTION INTRAMUSCULAR; INTRAVENOUS ONCE
Status: COMPLETED | OUTPATIENT
Start: 2019-05-23 | End: 2019-05-23

## 2019-05-23 RX ADMIN — Medication 10 ML: at 21:09

## 2019-05-23 RX ADMIN — ROCURONIUM BROMIDE 30 MG: 10 INJECTION, SOLUTION INTRAVENOUS at 13:59

## 2019-05-23 RX ADMIN — Medication 3 MG: at 15:53

## 2019-05-23 RX ADMIN — MIDAZOLAM 1 MG: 1 INJECTION INTRAMUSCULAR; INTRAVENOUS at 13:33

## 2019-05-23 RX ADMIN — ROCURONIUM BROMIDE 20 MG: 10 INJECTION, SOLUTION INTRAVENOUS at 14:43

## 2019-05-23 RX ADMIN — SODIUM CHLORIDE, POTASSIUM CHLORIDE, SODIUM LACTATE AND CALCIUM CHLORIDE: 600; 310; 30; 20 INJECTION, SOLUTION INTRAVENOUS at 13:51

## 2019-05-23 RX ADMIN — ROPIVACAINE HYDROCHLORIDE 40 ML: 5 INJECTION, SOLUTION EPIDURAL; INFILTRATION; PERINEURAL at 13:59

## 2019-05-23 RX ADMIN — ONDANSETRON HYDROCHLORIDE 4 MG: 2 INJECTION, SOLUTION INTRAMUSCULAR; INTRAVENOUS at 15:39

## 2019-05-23 RX ADMIN — FENTANYL CITRATE 50 MCG: 50 INJECTION, SOLUTION INTRAMUSCULAR; INTRAVENOUS at 13:46

## 2019-05-23 RX ADMIN — FENTANYL CITRATE 50 MCG: 50 INJECTION, SOLUTION INTRAMUSCULAR; INTRAVENOUS at 13:59

## 2019-05-23 RX ADMIN — DEXAMETHASONE SODIUM PHOSPHATE 10 MG: 10 INJECTION INTRAMUSCULAR; INTRAVENOUS at 13:59

## 2019-05-23 RX ADMIN — DOCUSATE SODIUM 100 MG: 100 CAPSULE, LIQUID FILLED ORAL at 21:08

## 2019-05-23 RX ADMIN — ROPIVACAINE HYDROCHLORIDE 40 ML: 5 INJECTION EPIDURAL; INFILTRATION; PERINEURAL at 13:45

## 2019-05-23 RX ADMIN — CEFAZOLIN SODIUM 2 G: 2 SOLUTION INTRAVENOUS at 21:08

## 2019-05-23 RX ADMIN — PROPOFOL 100 MG: 10 INJECTION, EMULSION INTRAVENOUS at 13:59

## 2019-05-23 RX ADMIN — PHENYLEPHRINE HYDROCHLORIDE 100 MCG: 10 INJECTION INTRAVENOUS at 14:15

## 2019-05-23 RX ADMIN — SODIUM CHLORIDE, POTASSIUM CHLORIDE, SODIUM LACTATE AND CALCIUM CHLORIDE: 600; 310; 30; 20 INJECTION, SOLUTION INTRAVENOUS at 14:34

## 2019-05-23 RX ADMIN — Medication 0.6 MG: at 15:53

## 2019-05-23 RX ADMIN — LIDOCAINE HYDROCHLORIDE 20 MG: 20 INJECTION, SOLUTION INTRAVENOUS at 13:59

## 2019-05-23 RX ADMIN — CEFAZOLIN 2000 MG: 1 INJECTION, POWDER, FOR SOLUTION INTRAMUSCULAR; INTRAVENOUS at 14:10

## 2019-05-23 ASSESSMENT — PULMONARY FUNCTION TESTS
PIF_VALUE: 17
PIF_VALUE: 21
PIF_VALUE: 15
PIF_VALUE: 15
PIF_VALUE: 22
PIF_VALUE: 2
PIF_VALUE: 24
PIF_VALUE: 22
PIF_VALUE: 21
PIF_VALUE: 0
PIF_VALUE: 21
PIF_VALUE: 22
PIF_VALUE: 15
PIF_VALUE: 21
PIF_VALUE: 22
PIF_VALUE: 22
PIF_VALUE: 14
PIF_VALUE: 2
PIF_VALUE: 21
PIF_VALUE: 22
PIF_VALUE: 22
PIF_VALUE: 21
PIF_VALUE: 26
PIF_VALUE: 22
PIF_VALUE: 22
PIF_VALUE: 21
PIF_VALUE: 22
PIF_VALUE: 23
PIF_VALUE: 21
PIF_VALUE: 22
PIF_VALUE: 0
PIF_VALUE: 21
PIF_VALUE: 13
PIF_VALUE: 15
PIF_VALUE: 25
PIF_VALUE: 2
PIF_VALUE: 14
PIF_VALUE: 22
PIF_VALUE: 22
PIF_VALUE: 15
PIF_VALUE: 22
PIF_VALUE: 15
PIF_VALUE: 14
PIF_VALUE: 13
PIF_VALUE: 22
PIF_VALUE: 22
PIF_VALUE: 21
PIF_VALUE: 22
PIF_VALUE: 22
PIF_VALUE: 21
PIF_VALUE: 22
PIF_VALUE: 21
PIF_VALUE: 1
PIF_VALUE: 2
PIF_VALUE: 13
PIF_VALUE: 22
PIF_VALUE: 0
PIF_VALUE: 22
PIF_VALUE: 22
PIF_VALUE: 14
PIF_VALUE: 14
PIF_VALUE: 22
PIF_VALUE: 21
PIF_VALUE: 21
PIF_VALUE: 22
PIF_VALUE: 23
PIF_VALUE: 2
PIF_VALUE: 0
PIF_VALUE: 21
PIF_VALUE: 4
PIF_VALUE: 21
PIF_VALUE: 3
PIF_VALUE: 21
PIF_VALUE: 15
PIF_VALUE: 5
PIF_VALUE: 21
PIF_VALUE: 14
PIF_VALUE: 20
PIF_VALUE: 22
PIF_VALUE: 21
PIF_VALUE: 22
PIF_VALUE: 21
PIF_VALUE: 14
PIF_VALUE: 22
PIF_VALUE: 22
PIF_VALUE: 21
PIF_VALUE: 18
PIF_VALUE: 15
PIF_VALUE: 21
PIF_VALUE: 14
PIF_VALUE: 22
PIF_VALUE: 1
PIF_VALUE: 22
PIF_VALUE: 21
PIF_VALUE: 23
PIF_VALUE: 1
PIF_VALUE: 0
PIF_VALUE: 21
PIF_VALUE: 22
PIF_VALUE: 15
PIF_VALUE: 22
PIF_VALUE: 21
PIF_VALUE: 14
PIF_VALUE: 22
PIF_VALUE: 15
PIF_VALUE: 22
PIF_VALUE: 20
PIF_VALUE: 22
PIF_VALUE: 0
PIF_VALUE: 22
PIF_VALUE: 3
PIF_VALUE: 22

## 2019-05-23 ASSESSMENT — PAIN SCALES - GENERAL
PAINLEVEL_OUTOF10: 0

## 2019-05-23 ASSESSMENT — PAIN - FUNCTIONAL ASSESSMENT: PAIN_FUNCTIONAL_ASSESSMENT: 0-10

## 2019-05-23 NOTE — ANESTHESIA PROCEDURE NOTES
Peripheral Block    Patient location during procedure: procedure area  Start time: 5/23/2019 1:30 PM  End time: 5/23/2019 1:40 PM  Staffing  Anesthesiologist: Betty Solitario MD  Performed: anesthesiologist   Preanesthetic Checklist  Completed: patient identified, site marked, surgical consent, pre-op evaluation, timeout performed, IV checked, risks and benefits discussed, monitors and equipment checked, anesthesia consent given, oxygen available and patient being monitored  Peripheral Block  Patient position: supine  Prep: ChloraPrep  Patient monitoring: cardiac monitor, continuous pulse ox, continuous capnometry, frequent blood pressure checks and IV access  Block type: Femoral  Laterality: left  Injection technique: single-shot  Procedures: ultrasound guided  Local infiltration: ropivacaine  Infiltration strength: 0.5 %  Dose: 40 mL  Femoral crease  Provider prep: mask and sterile gloves  Local infiltration: ropivacaine  Needle  Needle type: combined needle/nerve stimulator   Needle gauge: 21 G  Needle length: 8 cm  Needle localization: ultrasound guidance  Assessment  Injection assessment: negative aspiration for heme, no paresthesia on injection and local visualized surrounding nerve on ultrasound  Paresthesia pain: none  Slow fractionated injection: yes  Hemodynamics: stable  Additional Notes  Patient tolerated procedure well. VSS.   Versed 1 mg and fentanyl 50 mcg IV given for sedation for block  Reason for block: post-op pain management and at surgeon's request
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
## 2019-05-23 NOTE — ANESTHESIA POSTPROCEDURE EVALUATION
Department of Anesthesiology  Postprocedure Note    Patient: Kourtney Hirsch  MRN: [id number]  YOB: 1934  Date of evaluation: 5/24/2019  Time:  6:41 AM     Procedure Summary     Date:  05/23/19 Room / Location:  [facility] OR 08 / [facility] OR    Anesthesia Start:  1351 Anesthesia Stop:      Procedure:  LEFT LEG EX- FIX REMOVAL , LEFT TIBIA OPEN REDUCTION INTERNAL FIXATION--SYNTHES (Left ) Diagnosis:  (LEFT P[SPENCER TIBIA FX . S/P EX-FIX APPLICATION)    Surgeon:  Marcela Morales MD Responsible Provider:  Marleen Raymundo MD    Anesthesia Type:  general, regional ASA Status:  3          Anesthesia Type: general, regional    Donna Phase I: Donna Score: 8    Donna Phase II:      Last vitals: Reviewed and per EMR flowsheets.        Anesthesia Post Evaluation    Patient location during evaluation: PACU  Patient participation: complete - patient participated  Level of consciousness: awake  Pain score: 3  Airway patency: patent  Nausea & Vomiting: no nausea and no vomiting  Complications: no  Cardiovascular status: blood pressure returned to baseline  Respiratory status: acceptable  Hydration status: euvolemic

## 2019-05-23 NOTE — H&P
allergies.     Social History:   TOBACCO:   reports that he has never smoked. He does not have any smokeless tobacco history on file. ETOH:   reports that he does not drink alcohol. DRUGS:   reports that he does not use drugs. ACTIVITIES OF DAILY LIVING:    OCCUPATION:    Family History:   Family History             Problem Relation Age of Onset    Heart Disease Mother      Heart Disease Father              REVIEW OF SYSTEMS:  CONSTITUTIONAL:  negative for  fevers, chills  EYES:  negative for blurred vision, visual disturbance  HEENT:  negative for  hearing loss, voice change  RESPIRATORY:  negative for  dyspnea, wheezing  CARDIOVASCULAR:  negative for  chest pain, palpitations  GASTROINTESTINAL:  negative for nausea, vomiting  GENITOURINARY:  negative for frequency, urinary incontinence  HEMATOLOGIC/LYMPHATIC:  negative for bleeding and petechiae  MUSCULOSKELETAL:  positive for  Pain left knee  NEUROLOGICAL:  negative for headaches, dizziness  BEHAVIOR/PSYCH:  negative for increased agitation and anxiety     PHYSICAL EXAM:    VITALS:  /77   Pulse 83   Temp 97 °F (36.1 °C) (Temporal)   Resp 20   Ht 6' 1\" (1.854 m)   Wt 200 lb (90.7 kg)   SpO2 93%   BMI 26.39 kg/m²   CONSTITUTIONAL:  awake, alert, cooperative, no apparent distress, and appears stated age  MUSCULOSKELETAL:  Left lower Extremity:  · Skin intact circumferentially  · Pins clean dry and tight  · There is moderate edema to the proximal part of the left upper extremity. The knee joint on the lateral side. · All compartments are soft and compressible   · There is no pain with passive flexion of the toes  · Patient is able to actively flex and extend the toes and ankle.   · Positive TTP about the knee diffusely  · Sensations intact to light touch in the sural, saphenous, tibial, deep and superficial peroneal nerve distributions to the foot  · +2/4 DP and PT pulses          DATA:    CBC:         Lab Results   Component Value Date     WBC 22.3

## 2019-05-24 LAB
ANION GAP SERPL CALCULATED.3IONS-SCNC: 13 MMOL/L (ref 7–16)
BUN BLDV-MCNC: 33 MG/DL (ref 8–23)
CALCIUM SERPL-MCNC: 9.1 MG/DL (ref 8.6–10.2)
CHLORIDE BLD-SCNC: 102 MMOL/L (ref 98–107)
CO2: 25 MMOL/L (ref 22–29)
CREAT SERPL-MCNC: 1 MG/DL (ref 0.7–1.2)
GFR AFRICAN AMERICAN: >60
GFR NON-AFRICAN AMERICAN: >60 ML/MIN/1.73
GLUCOSE BLD-MCNC: 146 MG/DL (ref 74–99)
HCT VFR BLD CALC: 36.3 % (ref 37–54)
HEMOGLOBIN: 11.8 G/DL (ref 12.5–16.5)
MCH RBC QN AUTO: 30.2 PG (ref 26–35)
MCHC RBC AUTO-ENTMCNC: 32.5 % (ref 32–34.5)
MCV RBC AUTO: 92.8 FL (ref 80–99.9)
PDW BLD-RTO: 13.3 FL (ref 11.5–15)
PLATELET # BLD: 183 E9/L (ref 130–450)
PMV BLD AUTO: 10.3 FL (ref 7–12)
POTASSIUM SERPL-SCNC: 5.1 MMOL/L (ref 3.5–5)
RBC # BLD: 3.91 E12/L (ref 3.8–5.8)
SODIUM BLD-SCNC: 140 MMOL/L (ref 132–146)
WBC # BLD: 21.9 E9/L (ref 4.5–11.5)

## 2019-05-24 PROCEDURE — 1200000000 HC SEMI PRIVATE

## 2019-05-24 PROCEDURE — 6360000002 HC RX W HCPCS: Performed by: STUDENT IN AN ORGANIZED HEALTH CARE EDUCATION/TRAINING PROGRAM

## 2019-05-24 PROCEDURE — 97530 THERAPEUTIC ACTIVITIES: CPT

## 2019-05-24 PROCEDURE — 36415 COLL VENOUS BLD VENIPUNCTURE: CPT

## 2019-05-24 PROCEDURE — L1832 KO ADJ JNT POS R SUP PRE CST: HCPCS

## 2019-05-24 PROCEDURE — 85027 COMPLETE CBC AUTOMATED: CPT

## 2019-05-24 PROCEDURE — 2700000000 HC OXYGEN THERAPY PER DAY

## 2019-05-24 PROCEDURE — 2580000003 HC RX 258: Performed by: STUDENT IN AN ORGANIZED HEALTH CARE EDUCATION/TRAINING PROGRAM

## 2019-05-24 PROCEDURE — 80048 BASIC METABOLIC PNL TOTAL CA: CPT

## 2019-05-24 PROCEDURE — 97166 OT EVAL MOD COMPLEX 45 MIN: CPT

## 2019-05-24 PROCEDURE — 97161 PT EVAL LOW COMPLEX 20 MIN: CPT

## 2019-05-24 RX ADMIN — ENOXAPARIN SODIUM 30 MG: 30 INJECTION SUBCUTANEOUS at 09:33

## 2019-05-24 RX ADMIN — CEFAZOLIN SODIUM 2 G: 2 SOLUTION INTRAVENOUS at 05:47

## 2019-05-24 RX ADMIN — ENOXAPARIN SODIUM 30 MG: 30 INJECTION SUBCUTANEOUS at 21:00

## 2019-05-24 RX ADMIN — Medication 10 ML: at 09:33

## 2019-05-24 ASSESSMENT — PAIN SCALES - GENERAL
PAINLEVEL_OUTOF10: 0

## 2019-05-24 NOTE — PROGRESS NOTES
Department of Orthopedic Surgery  Resident Progress Note    Patient seen and examined. Pain controlled. No new complaints. Denies chest pain, shortness of breath, calf pain, dizziness/lightheadedness. Denies abdominal pain. -BM, + flatulence    VITALS:  /62   Pulse 84   Temp 97.4 °F (36.3 °C) (Temporal)   Resp 16   Ht 6' 1\" (1.854 m)   Wt 200 lb (90.7 kg)   SpO2 97%   BMI 26.39 kg/m²     GENERAL: Alert, awake, oriented  MUSCULOSKELETAL:   left lower extremity:  · Dressing C/D/I  · Knee immobilizer and place  · Compartments soft and compressible  · Palpable dorsalis pedis and posterior tibialis pulse, brisk cap refill to toes, foot warm and perfused  · Sensation intact to light touch in sural/deep peroneal/superficial peroneal/saphenous/posterior tibial nerve distributions to foot/ankle.   · Demonstrates active ankle plantar/dorsiflexion/great toe extension    CBC:   Lab Results   Component Value Date    WBC 19.0 05/18/2019    HGB 12.1 05/18/2019    HCT 36.8 05/18/2019     05/18/2019       ASSESSMENT  · Status postop left tibial plateau ORIF 4/10/91    PLAN    Nonweightbearing left lower extremity  Pain control IV & PO  DVT prophylaxis- Lovenox, early mobilization  Monitor Labs  Trend H&H- pending this morning  24 hr post op ABX   PT/OT  D/C planning, SW/PT recs, possible discharge back to previous facility today following pain control  Discuss with attending

## 2019-05-24 NOTE — PROGRESS NOTES
performs stand pivot transfer with or without ww     Initial Evaluation  Date: 5/24/19 Treatment Short Term/ Long Term   Goals   AM-PAC 6 Clicks 27/80     Was pt agreeable to Eval/treatment? yes     Does pt have pain? Yes LLE. Bed Mobility  Rolling: Max A  Supine to sit: max A x 2  Sit to supine: Max a x 2  Scooting: Max A  Rolling: Ind  Supine to sit: Ind  Sit to supine: Ind  Scooting: Ind   Transfers Sit to stand: NT patient declined. Stand to sit: NT  Stand pivot: NT  Sit to stand: Mod  Stand to sit: Mod  Stand pivot: Mod   Ambulation    NT  To be determined. Stair negotiation: ascended and descended  NT  No goal.    ROM BUE:  See OT eval  BLE:  LLE in KI     Strength BUE: See OT eval   RLE:  4-/5  LLE:   3+/5 hip  4/5   Balance Sitting EOB:  MIn  Dynamic Standing:  NT  Sitting EOB:  Ind  Dynamic Standing: Mod maintain NWB LLE. Pt is A & O x 3  Sensation:  Pt denies numbness and tingling to extremities  Edema:  none    Patient education  Pt educated on  call light for safety and NWB status LLE.  use of spinal precautions    Patient response to education:   Pt verbalized understanding Pt demonstrated skill Pt requires further education in this area   yes yes reminders     Comments:  Patient was seen this date for PT evaluation. Results of the functional assessment are noted above. Upon entering the room patient was found in supine position in bed. Patinet made aware of NWB status LLE. Therapist educated and facilitated patient on techniques to increase safety and independence with bed mobility, balance, functional transfers, and functional mobility. Sat EOB x 5 minutes to increase dynamic sitting balance and activity tolerance. At end of session, patient in bed with LLE call light and phone within reach,  all lines and tubes intact, nursing notified. This patient can benefit from the continuation of skilled PT to maximize functional level and return to PLOF.      Pts/ family goals   1. get stronger    Patient and or family understand(s) diagnosis, prognosis, and plan of care. yes    PLAN  PT care will be provided in accordance with the objectives noted above. Whenever appropriate, clear delegation orders will be provided for nursing staff. Exercises and functional mobility practice will be used as well as appropriate assistive devices or modalities to obtain goals. Patient and family education will also be administered as needed. Frequency of treatments: daily  x 2-3 days.          Time in  1048  Time out  [address] [facility], [address] [facility]
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
## 2019-05-24 NOTE — PROGRESS NOTES
Patient has authorization, which is good through the weekend to return to [facility], per DREW Shin.

## 2019-05-24 NOTE — CARE COORDINATION
5/24/2019 social work transition of care  Sw notified that Susy Harman initiated for return to [facility].  Mary/Nicanor following   Electronically signed by DREW Castorena on 5/24/2019 at 1:45 PM

## 2019-05-24 NOTE — BRIEF OP NOTE
Brief Postoperative Note  ______________________________________________________________    Patient: Mary Beth Saba  YOB: 1934  MRN: [id number]  Date of Procedure: 5/23/2019    Pre-Op Diagnosis: LEFT P[SPENCER TIBIA FX . S/P EX-FIX APPLICATION    Post-Op Diagnosis: Same       Procedure(s):  LEFT LEG EX- FIX REMOVAL , LEFT TIBIA OPEN REDUCTION INTERNAL FIXATION--SYNTHES    Anesthesia: Regional, General    Surgeon(s):  Lauren Rayo MD    Assistant:   Joseph Rodriguez    Estimated Blood Loss (mL): 50    Complications: None    Specimens:   * No specimens in log *    Implants:  Implant Name Type Inv.  Item Serial No.  Lot No. LRB No. Used   SCREW CORTX SLFTP LG FRAG 4.5X36MM Screw/Plate/Nail/Dejuan SCREW CORTX SLFTP LG FRAG 4.5X36MM  SYNTHES  Left 1   SCREW CORTX SLFTP LG FRAG 4.5X44MM Screw/Plate/Nail/Dejuan SCREW CORTX SLFTP LG FRAG 4.5X44MM  SYNTHES  Left 1   PLATE TIB PROX LK LCP SS LT [id number] ST Screw/Plate/Nail/Dejuan PLATE TIB PROX LK LCP SS LT [id number] ST  SYNTHES  Left 1   SCREW LK SLFTP W/ T25 5.0X32MM Screw/Plate/Nail/Dejuan SCREW LK SLFTP W/ T25 5.0X32MM  SYNTHES  Left 1   SCREW LK SLFTP W/ T25 5.0X36MM Screw/Plate/Nail/Djeuan SCREW LK SLFTP W/ T25 5.0X36MM  SYNTHES  Left 1   SCREW LK SLFTP W/ T25 5.0X85MM Screw/Plate/Nail/Dejuan SCREW LK SLFTP W/ T25 5.0X85MM  SYNTHES  Left 2   SCREW LK SLFTP W/ T25 5.0X90MM Screw/Plate/Nail/Dejuan SCREW LK SLFTP W/ T25 5.0X90MM  SYNTHES  Left 2         Drains:   Urethral Catheter Coude 16 fr (Active)   $ Urethral catheter insertion $ Not inserted for procedure 5/19/2019  7:23 AM   Catheter Indications Acute urinary retention/obstruction 5/24/2019  1:36 AM   Urine Color Mari 5/24/2019  1:36 AM   Urine Appearance Clear 5/24/2019  1:36 AM   Output (mL) 250 mL 5/21/2019  4:27 AM       [REMOVED] Urethral Catheter (Removed)   Catheter Indications Acute urinary retention/obstruction 5/18/2019  6:37 AM   Urine Color Yellow 5/18/2019  6:37 AM Urine Appearance Clear 5/18/2019  6:37 AM   Output (mL) 500 mL 5/18/2019  6:37 AM       Findings: see op janett Pillai DO  Date: 5/24/2019  Time: 4:44 AM

## 2019-05-24 NOTE — CARE COORDINATION
5/24/2019 social work transition of care  Sw followed up with [address]  [phone]. Pt was skilled, can return, will need pre cert. Will need pt/ot evals for auth to be initiated. Envelope with ambulance form will be in soft chart.   Electronically signed by DREW Greene on 5/24/2019 at 9:52 AM
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
## 2019-05-25 VITALS
HEIGHT: 73 IN | RESPIRATION RATE: 16 BRPM | OXYGEN SATURATION: 94 % | WEIGHT: 200 LBS | BODY MASS INDEX: 26.51 KG/M2 | DIASTOLIC BLOOD PRESSURE: 64 MMHG | TEMPERATURE: 97.3 F | SYSTOLIC BLOOD PRESSURE: 134 MMHG | HEART RATE: 80 BPM

## 2019-05-25 LAB
ALBUMIN SERPL-MCNC: 2.8 G/DL (ref 3.5–5.2)
ALP BLD-CCNC: 73 U/L (ref 40–129)
ALT SERPL-CCNC: 25 U/L (ref 0–40)
ANION GAP SERPL CALCULATED.3IONS-SCNC: 9 MMOL/L (ref 7–16)
AST SERPL-CCNC: 30 U/L (ref 0–39)
BASOPHILS ABSOLUTE: 0 E9/L (ref 0–0.2)
BASOPHILS RELATIVE PERCENT: 0 % (ref 0–2)
BILIRUB SERPL-MCNC: 0.7 MG/DL (ref 0–1.2)
BUN BLDV-MCNC: 32 MG/DL (ref 8–23)
CALCIUM SERPL-MCNC: 8.8 MG/DL (ref 8.6–10.2)
CHLORIDE BLD-SCNC: 101 MMOL/L (ref 98–107)
CO2: 27 MMOL/L (ref 22–29)
CREAT SERPL-MCNC: 1.1 MG/DL (ref 0.7–1.2)
EOSINOPHILS ABSOLUTE: 0.2 E9/L (ref 0.05–0.5)
EOSINOPHILS RELATIVE PERCENT: 1 % (ref 0–6)
GFR AFRICAN AMERICAN: >60
GFR NON-AFRICAN AMERICAN: >60 ML/MIN/1.73
GLUCOSE BLD-MCNC: 125 MG/DL (ref 74–99)
HCT VFR BLD CALC: 32.9 % (ref 37–54)
HEMOGLOBIN: 10.6 G/DL (ref 12.5–16.5)
LYMPHOCYTES ABSOLUTE: 15.02 E9/L (ref 1.5–4)
LYMPHOCYTES RELATIVE PERCENT: 77 % (ref 20–42)
MCH RBC QN AUTO: 30.5 PG (ref 26–35)
MCHC RBC AUTO-ENTMCNC: 32.2 % (ref 32–34.5)
MCV RBC AUTO: 94.5 FL (ref 80–99.9)
MONOCYTES ABSOLUTE: 0.58 E9/L (ref 0.1–0.95)
MONOCYTES RELATIVE PERCENT: 3 % (ref 2–12)
NEUTROPHILS ABSOLUTE: 3.71 E9/L (ref 1.8–7.3)
NEUTROPHILS RELATIVE PERCENT: 19 % (ref 43–80)
PDW BLD-RTO: 13.3 FL (ref 11.5–15)
PLATELET # BLD: 173 E9/L (ref 130–450)
PMV BLD AUTO: 10 FL (ref 7–12)
POTASSIUM SERPL-SCNC: 4.4 MMOL/L (ref 3.5–5)
RBC # BLD: 3.48 E12/L (ref 3.8–5.8)
RBC # BLD: NORMAL 10*6/UL
SODIUM BLD-SCNC: 137 MMOL/L (ref 132–146)
TOTAL PROTEIN: 5 G/DL (ref 6.4–8.3)
WBC # BLD: 19.5 E9/L (ref 4.5–11.5)

## 2019-05-25 PROCEDURE — 2580000003 HC RX 258: Performed by: STUDENT IN AN ORGANIZED HEALTH CARE EDUCATION/TRAINING PROGRAM

## 2019-05-25 PROCEDURE — 36415 COLL VENOUS BLD VENIPUNCTURE: CPT

## 2019-05-25 PROCEDURE — 6370000000 HC RX 637 (ALT 250 FOR IP): Performed by: STUDENT IN AN ORGANIZED HEALTH CARE EDUCATION/TRAINING PROGRAM

## 2019-05-25 PROCEDURE — 6360000002 HC RX W HCPCS: Performed by: STUDENT IN AN ORGANIZED HEALTH CARE EDUCATION/TRAINING PROGRAM

## 2019-05-25 PROCEDURE — 85025 COMPLETE CBC W/AUTO DIFF WBC: CPT

## 2019-05-25 PROCEDURE — 80053 COMPREHEN METABOLIC PANEL: CPT

## 2019-05-25 PROCEDURE — 2700000000 HC OXYGEN THERAPY PER DAY

## 2019-05-25 RX ADMIN — ENOXAPARIN SODIUM 30 MG: 30 INJECTION SUBCUTANEOUS at 08:14

## 2019-05-25 RX ADMIN — Medication 10 ML: at 08:14

## 2019-05-25 RX ADMIN — HYDROCODONE BITARTRATE AND ACETAMINOPHEN 2 TABLET: 5; 325 TABLET ORAL at 01:04

## 2019-05-25 ASSESSMENT — PAIN DESCRIPTION - DESCRIPTORS: DESCRIPTORS: ACHING

## 2019-05-25 ASSESSMENT — PAIN DESCRIPTION - LOCATION: LOCATION: BACK;NECK

## 2019-05-25 ASSESSMENT — PAIN SCALES - GENERAL: PAINLEVEL_OUTOF10: 8

## 2019-05-25 ASSESSMENT — PAIN DESCRIPTION - ONSET: ONSET: GRADUAL

## 2019-05-25 ASSESSMENT — PAIN DESCRIPTION - PAIN TYPE: TYPE: ACUTE PAIN

## 2019-05-25 ASSESSMENT — PAIN DESCRIPTION - FREQUENCY: FREQUENCY: INTERMITTENT

## 2019-05-25 ASSESSMENT — PAIN SCALES - WONG BAKER: WONGBAKER_NUMERICALRESPONSE: 0

## 2019-05-25 NOTE — CARE COORDINATION
Social work made aware patient has Jovanni Basque for [employer] and can go today. SW spoke with Roxana with [employer]. Social work set up transport for 1300 by 2025 [facility] ambulance. Patient, family, liaison, RN, and charge RN made aware.   Electronically signed by DREW Acevedo on 5/25/2019 at 9:55 AM

## 2019-05-25 NOTE — PROGRESS NOTES
Department of Orthopedic Surgery  Resident Progress Note    Patient seen and examined. Pain controlled. No new complaints. Denies chest pain, shortness of breath, calf pain, dizziness/lightheadedness. Nursing reports patient will be unable to go rehab today secondary to insurance issues     VITALS:  /64   Pulse 88   Temp 97.7 °F (36.5 °C) (Temporal)   Resp 16   Ht 6' 1\" (1.854 m)   Wt 200 lb (90.7 kg)   SpO2 90%   BMI 26.39 kg/m²     GENERAL: Alert, awake, oriented  MUSCULOSKELETAL:   left lower extremity:  · Dressing C/D/I  · Knee immobilizer and place  · Compartments soft and compressible  · Palpable dorsalis pedis and posterior tibialis pulse, brisk cap refill to toes, foot warm and perfused  · Sensation intact to light touch in sural/deep peroneal/superficial peroneal/saphenous/posterior tibial nerve distributions to foot/ankle.   · Demonstrates active ankle plantar/dorsiflexion/great toe extension    CBC:   Lab Results   Component Value Date    WBC 19.5 05/25/2019    HGB 10.6 05/25/2019    HCT 32.9 05/25/2019     05/25/2019       ASSESSMENT  · Status postop left tibial plateau ORIF 6/66/81    PLAN    Nonweightbearing left lower extremity  Pain control   DVT prophylaxis- Lovenox, early mobilization  Monitor Labs  Trend H&H  PT/OT  D/C planning, SW/PT recs, possible discharge back to previous facility today following pain control  Discuss with attending

## 2019-06-03 ENCOUNTER — TELEPHONE (OUTPATIENT)
Dept: ORTHOPEDIC SURGERY | Age: 84
End: 2019-06-03

## 2019-06-04 ENCOUNTER — TELEPHONE (OUTPATIENT)
Dept: ORTHOPEDIC SURGERY | Age: 84
End: 2019-06-04

## 2019-06-04 NOTE — TELEPHONE ENCOUNTER
Called and spoke with Donnita Mortimer at [facility] / she will relay message re: wound care.

## 2019-06-05 DIAGNOSIS — R52 PAIN: Primary | ICD-10-CM

## 2019-06-07 ENCOUNTER — HOSPITAL ENCOUNTER (OUTPATIENT)
Dept: GENERAL RADIOLOGY | Age: 84
Discharge: HOME | End: 2019-06-09
Payer: MEDICARE

## 2019-06-07 ENCOUNTER — OFFICE VISIT (OUTPATIENT)
Dept: ORTHOPEDIC SURGERY | Age: 84
End: 2019-06-07
Payer: MEDICARE

## 2019-06-07 VITALS
DIASTOLIC BLOOD PRESSURE: 59 MMHG | TEMPERATURE: 97.9 F | WEIGHT: 187 LBS | HEIGHT: 73 IN | BODY MASS INDEX: 24.78 KG/M2 | SYSTOLIC BLOOD PRESSURE: 97 MMHG | HEART RATE: 109 BPM

## 2019-06-07 DIAGNOSIS — S82.142D FRACTURE OF TIBIAL PLATEAU, LEFT, CLOSED, WITH ROUTINE HEALING, SUBSEQUENT ENCOUNTER: Primary | ICD-10-CM

## 2019-06-07 DIAGNOSIS — R52 PAIN: ICD-10-CM

## 2019-06-07 PROCEDURE — 73590 X-RAY EXAM OF LOWER LEG: CPT

## 2019-06-07 PROCEDURE — 99212 OFFICE O/P EST SF 10 MIN: CPT

## 2019-06-07 PROCEDURE — 99024 POSTOP FOLLOW-UP VISIT: CPT | Performed by: ORTHOPAEDIC SURGERY
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
## 2019-06-10 NOTE — DISCHARGE SUMMARY
Physician Discharge Summary     Patient ID:  Graeme Rosales  [id number]  82 y.o.  1934    Admit date: 5/23/2019    Discharge date and time: 5/25/2019  1:50 PM     Admitting Physician: Ravinder Negro MD     Discharge Physician: Ravinder Negro MD    Admission Diagnoses: Fracture of tibial plateau, left, closed, with routine healing, subsequent encounter [E08.313V]    Discharge Diagnoses: s/p left removal of external fixator and open reduction and internal fixation of left tibia shaft fracture    Admission Condition: stable    Discharged Condition: stable    Hospital Course: The patient was admitted from the Holding area/ OR. After examination, review of radiologic studies, and appropriate pre-operative risk assessment, it was recommended by Ravinder Negro MD to undergo Removal of external fixator and ORIF of the left tibia shaft. The patient underwent an uneventful course of left ORIF with removal of external fixator of left proximal tibia shaft by Ravinder Negro MD on 5/23/19. The patient was subsequently taken to the PACU and the floor in stable condition. The patient was continued on antibiotics for 24 hours post-operatively as they received a dose of antibiotics pre-operatively as well. The patient was started on DVT prophylaxis and physical therapy with instructions to be NWB. Blood counts were followed daily.  was consulted for d/c planning. On POD 2, after the patient had made appropriate gains in regaining independent function with physical therapy, the patient was discharged to skilled nursing facility in stable condition. Treatments: s/p left removal of external fixator and open reduction and internal fixation of left tibia shaft fracture    Disposition: The patient was provided instructions to continue DVT prophylaxis as instructed, pain medication as prescribed, and to continue daily dry sterile dressing changes until wound is dresssings.  The patient was to follow up with Azalea Crystal MD in 2 weeks, and to call the office for an appointment. suture (s) were to be removed on within 2 weeks. Medication List      ASK your doctor about these medications    acetaminophen 325 MG tablet  Commonly known as:  TYLENOL     atorvastatin 80 MG tablet  Commonly known as:  LIPITOR     b complex vitamins capsule     bisacodyl 10 MG suppository  Commonly known as:  DULCOLAX     coenzyme Q10 100 MG Caps capsule     docusate sodium 100 MG capsule  Commonly known as:  COLACE     enoxaparin 30 MG/0.3ML injection  Commonly known as:  LOVENOX  Inject 0.3 mLs into the skin 2 times daily for 28 days     finasteride 5 MG tablet  Commonly known as:  PROSCAR     HYDROcodone-acetaminophen 5-325 MG per tablet  Commonly known as:  NORCO  Take 1 tablet by mouth every 6 hours as needed for Pain for up to 7 days. Intended supply: 7 days. Take lowest dose possible to manage pain  Ask about: Should I take this medication?     magnesium hydroxide 400 MG/5ML suspension  Commonly known as:  MILK OF MAGNESIA     MENS 50+ MULTI VITAMIN/MIN Tabs     metoprolol tartrate 25 MG tablet  Commonly known as:  LOPRESSOR  Take 1 tablet by mouth 2 times daily. niacin 500 MG extended release capsule     tamsulosin 0.4 MG capsule  Commonly known as:  FLOMAX     Vitamin D3 5000 units Tabs            Signed:   Yifan Webb  6/10/2019  8:23 AM

## 2019-07-08 ENCOUNTER — TELEPHONE (OUTPATIENT)
Dept: ORTHOPEDIC SURGERY | Age: 84
End: 2019-07-08

## 2019-07-08 DIAGNOSIS — S82.142D FRACTURE OF TIBIAL PLATEAU, LEFT, CLOSED, WITH ROUTINE HEALING, SUBSEQUENT ENCOUNTER: Primary | ICD-10-CM

## 2019-07-09 ENCOUNTER — OFFICE VISIT (OUTPATIENT)
Dept: ORTHOPEDIC SURGERY | Age: 84
End: 2019-07-09

## 2019-07-09 VITALS
SYSTOLIC BLOOD PRESSURE: 98 MMHG | HEART RATE: 91 BPM | BODY MASS INDEX: 23.86 KG/M2 | HEIGHT: 73 IN | DIASTOLIC BLOOD PRESSURE: 60 MMHG | WEIGHT: 180 LBS

## 2019-07-09 DIAGNOSIS — S82.142D FRACTURE OF TIBIAL PLATEAU, LEFT, CLOSED, WITH ROUTINE HEALING, SUBSEQUENT ENCOUNTER: Primary | ICD-10-CM

## 2019-07-09 DIAGNOSIS — S82.242D CLOSED DISPLACED SPIRAL FRACTURE OF SHAFT OF LEFT TIBIA WITH ROUTINE HEALING, SUBSEQUENT ENCOUNTER: ICD-10-CM

## 2019-07-09 PROCEDURE — 99024 POSTOP FOLLOW-UP VISIT: CPT | Performed by: PHYSICIAN ASSISTANT

## 2019-07-09 NOTE — PROGRESS NOTES
over about the tibial plateau. Demonstrates active knee flexion/extension, weakness with dorsiflexion, dorsiflexion and EHL not intact, per patient this has been ongoing since injury (denies any numbness in addition to weakness)   Sensation intact to light touch in sural/deep peroneal/superficial peroneal/saphenous/posterior tibial nerve distributions to foot/ankle. Palpable dorsalis pedis and posterior tibialis pulses, cap refill brisk in toes, foot warm/perfused. Compartments supple throughout thigh and leg. Calves soft non tender    BP 98/60 (Site: Left Upper Arm, Position: Sitting, Cuff Size: Medium Adult)   Pulse 91   Ht 6' 1\" (1.854 m)   Wt 180 lb (81.6 kg)   BMI 23.75 kg/m²     XR:  Was obtained of the left knee demonstrating a tibial plateau, and proximal tibia fracture, change in fracture alignment, minimal interval healing appreciated. Hardware intact without evidence of lucency or wear. No acute osseous abnormalities. Assessment:   Diagnosis Orders   1. Fracture of tibial plateau, left, closed, with routine healing, subsequent encounter     2. Closed displaced spiral fracture of shaft of left tibia with routine healing, subsequent encounter         Plan:  New Orders for [facility]:  Agnieszka Voss obtained today demonstrating a proximal tibia and tibial plateau fracture with interval healing appreciated no change in fracture alignment. WB:  Non-weight bearing, continue  Okay to TTWB for transfers only must remain in brace  Hinged knee brace:  On at all times, can remove for bathing and therapy, and sleeping  Advance per tibial plateau protocol at six week (currently at 0-60 needs advanced)  Advance brace 10 degrees per week until full ROM  Continue pt/ot: Attend therapy following the tibial plateau protocol at the six week trish , work on edema control  Also needs to work on tibialis anterior and EHL strengthening, weakness noted with both dorsiflexion and plantarflexion on exam  Consider AFO

## 2019-07-11 ENCOUNTER — TELEPHONE (OUTPATIENT)
Dept: ORTHOPEDIC SURGERY | Age: 84
End: 2019-07-11

## 2019-07-12 NOTE — TELEPHONE ENCOUNTER
Jovanny Amezcua who states that she is the  main Caregiver phoned re: care at facility. She states that she has power of  over patient, but I am unable to find document. She has concerns re: knee brace being off at night / should he be without the brace when sleeping? I [facility] and spoke with Francis Baker and she states that he is not wearing his brace at bedtime. He is very frustrated and wants to leave, but otherwise he is doing ok. Is it ok to continue not wearing brace at bedtime?

## 2019-07-12 NOTE — TELEPHONE ENCOUNTER
Last office note on 7/9/2019 with MIKE Mcgraw PA-C patient is okay to have brace off at night while sleeping  Electronically signed by Jesús Shelton PA-C on 7/12/2019 at 4:37 PM

## 2019-08-20 DIAGNOSIS — S82.142D FRACTURE OF TIBIAL PLATEAU, LEFT, CLOSED, WITH ROUTINE HEALING, SUBSEQUENT ENCOUNTER: Primary | ICD-10-CM

## 2019-08-21 ENCOUNTER — OFFICE VISIT (OUTPATIENT)
Dept: ORTHOPEDIC SURGERY | Age: 84
End: 2019-08-21
Payer: MEDICARE

## 2019-08-21 ENCOUNTER — HOSPITAL ENCOUNTER (OUTPATIENT)
Dept: GENERAL RADIOLOGY | Age: 84
Discharge: HOME | End: 2019-08-23
Payer: MEDICARE

## 2019-08-21 VITALS
BODY MASS INDEX: 23.99 KG/M2 | SYSTOLIC BLOOD PRESSURE: 109 MMHG | WEIGHT: 181 LBS | TEMPERATURE: 97 F | HEART RATE: 73 BPM | DIASTOLIC BLOOD PRESSURE: 65 MMHG | HEIGHT: 73 IN

## 2019-08-21 DIAGNOSIS — S82.142D FRACTURE OF TIBIAL PLATEAU, LEFT, CLOSED, WITH ROUTINE HEALING, SUBSEQUENT ENCOUNTER: Primary | ICD-10-CM

## 2019-08-21 DIAGNOSIS — S82.142D FRACTURE OF TIBIAL PLATEAU, LEFT, CLOSED, WITH ROUTINE HEALING, SUBSEQUENT ENCOUNTER: ICD-10-CM

## 2019-08-21 PROCEDURE — 99212 OFFICE O/P EST SF 10 MIN: CPT | Performed by: ORTHOPAEDIC SURGERY

## 2019-08-21 PROCEDURE — 99024 POSTOP FOLLOW-UP VISIT: CPT | Performed by: ORTHOPAEDIC SURGERY

## 2019-08-21 PROCEDURE — 73560 X-RAY EXAM OF KNEE 1 OR 2: CPT

## 2019-08-21 RX ORDER — ASPIRIN 81 MG/1
81 TABLET ORAL DAILY
COMMUNITY

## 2019-09-17 ENCOUNTER — TELEPHONE (OUTPATIENT)
Dept: ORTHOPEDIC SURGERY | Age: 84
End: 2019-09-17

## 2019-09-18 NOTE — TELEPHONE ENCOUNTER
Called, spoke to Shantanu Berger and told her No change in fracture alignment increased interval noted at 12 week xrays, okay to advance to the 12 week protocol and wean out of braces. She understood.

## 2019-09-23 ENCOUNTER — TELEPHONE (OUTPATIENT)
Dept: ADMINISTRATIVE | Age: 84
End: 2019-09-23

## 2019-11-04 DIAGNOSIS — S82.132D CLOSED FRACTURE OF MEDIAL PORTION OF LEFT TIBIAL PLATEAU WITH ROUTINE HEALING, SUBSEQUENT ENCOUNTER: Primary | ICD-10-CM

## 2019-11-06 ENCOUNTER — OFFICE VISIT (OUTPATIENT)
Dept: ORTHOPEDIC SURGERY | Age: 84
End: 2019-11-06
Payer: MEDICARE

## 2019-11-06 ENCOUNTER — HOSPITAL ENCOUNTER (OUTPATIENT)
Dept: GENERAL RADIOLOGY | Age: 84
Discharge: HOME | End: 2019-11-08
Payer: MEDICARE

## 2019-11-06 VITALS
HEART RATE: 65 BPM | BODY MASS INDEX: 24.52 KG/M2 | SYSTOLIC BLOOD PRESSURE: 135 MMHG | TEMPERATURE: 97 F | WEIGHT: 185 LBS | DIASTOLIC BLOOD PRESSURE: 77 MMHG | HEIGHT: 73 IN

## 2019-11-06 DIAGNOSIS — S82.132D CLOSED FRACTURE OF MEDIAL PORTION OF LEFT TIBIAL PLATEAU WITH ROUTINE HEALING, SUBSEQUENT ENCOUNTER: ICD-10-CM

## 2019-11-06 DIAGNOSIS — S82.142D FRACTURE OF TIBIAL PLATEAU, LEFT, CLOSED, WITH ROUTINE HEALING, SUBSEQUENT ENCOUNTER: Primary | ICD-10-CM

## 2019-11-06 PROCEDURE — 99213 OFFICE O/P EST LOW 20 MIN: CPT | Performed by: ORTHOPAEDIC SURGERY

## 2019-11-06 PROCEDURE — 73560 X-RAY EXAM OF KNEE 1 OR 2: CPT

## 2019-11-06 PROCEDURE — 99212 OFFICE O/P EST SF 10 MIN: CPT

## 2019-11-06 RX ORDER — CIPROFLOXACIN 250 MG/1
250 TABLET, FILM COATED ORAL 2 TIMES DAILY
COMMUNITY
End: 2022-01-01

## 2019-11-26 ENCOUNTER — TELEPHONE (OUTPATIENT)
Dept: ORTHOPEDIC SURGERY | Age: 84
End: 2019-11-26

## 2020-12-21 PROBLEM — J41.1 MUCOPURULENT CHRONIC BRONCHITIS (HCC): Status: ACTIVE | Noted: 2020-12-21
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
## 2021-03-26 DIAGNOSIS — S82.142D FRACTURE OF TIBIAL PLATEAU, LEFT, CLOSED, WITH ROUTINE HEALING, SUBSEQUENT ENCOUNTER: Primary | ICD-10-CM

## 2021-03-31 ENCOUNTER — HOSPITAL ENCOUNTER (OUTPATIENT)
Dept: GENERAL RADIOLOGY | Age: 86
Discharge: HOME | End: 2021-04-02
Payer: MEDICARE

## 2021-03-31 ENCOUNTER — TELEPHONE (OUTPATIENT)
Dept: ORTHOPEDIC SURGERY | Age: 86
End: 2021-03-31

## 2021-03-31 ENCOUNTER — OFFICE VISIT (OUTPATIENT)
Dept: ORTHOPEDIC SURGERY | Age: 86
End: 2021-03-31
Payer: MEDICARE

## 2021-03-31 VITALS — TEMPERATURE: 97.6 F

## 2021-03-31 DIAGNOSIS — S82.132D CLOSED FRACTURE OF MEDIAL PORTION OF LEFT TIBIAL PLATEAU WITH ROUTINE HEALING, SUBSEQUENT ENCOUNTER: ICD-10-CM

## 2021-03-31 DIAGNOSIS — S82.142D FRACTURE OF TIBIAL PLATEAU, LEFT, CLOSED, WITH ROUTINE HEALING, SUBSEQUENT ENCOUNTER: Primary | ICD-10-CM

## 2021-03-31 DIAGNOSIS — S82.142D FRACTURE OF TIBIAL PLATEAU, LEFT, CLOSED, WITH ROUTINE HEALING, SUBSEQUENT ENCOUNTER: ICD-10-CM

## 2021-03-31 PROCEDURE — 73590 X-RAY EXAM OF LOWER LEG: CPT

## 2021-03-31 PROCEDURE — 73560 X-RAY EXAM OF KNEE 1 OR 2: CPT

## 2021-03-31 PROCEDURE — 99213 OFFICE O/P EST LOW 20 MIN: CPT | Performed by: PHYSICIAN ASSISTANT

## 2021-03-31 PROCEDURE — 99212 OFFICE O/P EST SF 10 MIN: CPT

## 2021-03-31 RX ORDER — POLYVINYL ALCOHOL 14 MG/ML
1 SOLUTION/ DROPS OPHTHALMIC 4 TIMES DAILY
COMMUNITY

## 2021-03-31 RX ORDER — FOLIC ACID/VIT B COMPLEX AND C 400 MCG
1 TABLET ORAL DAILY
COMMUNITY
End: 2022-01-01

## 2021-03-31 RX ORDER — LOPERAMIDE HYDROCHLORIDE 2 MG/1
2 CAPSULE ORAL 4 TIMES DAILY PRN
COMMUNITY
End: 2022-01-01

## 2021-03-31 NOTE — PROGRESS NOTES
Nito Villatoro is a 80 y.o. male who presents for follow up of left knee. SURGEON: Dr. Zuhair Goldman MD  Date of Injury/Surgery: 5/23/2019  Date last seen in office: 11/06/2019    Symptoms: better  New complaints: patient is still unable to walk on his own. Weightbearing: left lower Non-weight bearing      Assistive device: wheelchair  Participating in therapy (location if yes)?  no    Refills Needed: None  Order/Referral Needed: no

## 2021-03-31 NOTE — PROGRESS NOTES
Chief Complaint   Patient presents with    Knee Injury     left tibial plateau ORIF 80/42/85       SUBJECTIVE: Kari Oneil is seen today for a follow-up visit. He is status post an ORIF left tibial plateau done in May 3105. He states that he is currently at the nursing facility now permanently but he has not done much as far as walking due to constraints with the pandemic. He states that they have been working on general strengthening and conditioning for his legs in therapy at the facility. He denies leg pain, numbness or tingling but states that his legs are little weak due to the lack of ambulating. Review of Systems -   General ROS: negative for - chills, fatigue, fever or night sweats  Respiratory ROS: no cough, shortness of breath, or wheezing  Cardiovascular ROS: no chest pain or dyspnea on exertion  Gastrointestinal ROS: no abdominal pain, change in bowel habits, or black or bloody stools  Genitourinary: no hematuria, dysuria, or incontinence   Musculoskeletal ROS:see above  Neurological ROS: no TIA or stroke symptoms       OBJECTIVE:   Alert and oriented X 3, no acute distress, respirations easy and unlabored with no audible wheezes, skin warm and dry, speech and dress appropriate for noted age, affect euthymic. Extremity:  Left Lower Extremity  Skin clean dry and intact, without signs of infection  Incisions well approximated without signs of redness, warmth or drainage- sutures intact  Mild edema noted  Compartments supple throughout thigh and leg  Calf supple and nontender  Demonstrates active knee flexion/extension, ankle plantar/dorsiflexion/great toe extension. States sensation intact to touch in sural/deep peroneal/superficial peroneal/saphenous/posterior tibial nerve distributions to foot/ankle. Palpable dorsalis pedis and posterior tibialis pulses, cap refill brisk in toes, foot warm/perfused. Good motion and strength in the left hip with hip flexion and range of motion.   He presents today in a wheelchair      XR: 3/31/21   Left knee and tib-fib films taken in the office today show the ORIF left tibial plateau fracture with the plate and screws remain in good position and alignment. No evidence of hardware failure or loosening. Temp 97.6 °F (36.4 °C) (Oral)     ASSESSMENT:     Diagnosis Orders   1. Fracture of tibial plateau, left, closed, with routine healing, subsequent encounter     2. Closed fracture of medial portion of left tibial plateau with routine healing, subsequent encounter         PLAN:  X-rays were reviewed and discussed today    OK for weightbearing as tolerated on the left lower extremity. Patient states that he is at the nursing facility center now permanently but has not been doing much as far as walking due to the pandemic. From our standpoint, he can and the patient would like to start doing more walking with the walker and assistance at the PT facility that is attached to the nursing home. Also continue working on general upper and lower extremity conditioning exercises. Follow-up on an as-needed basis and he was advised to contact the office if he has any problems or concerns    Patient reviewed and discussed with Dr. Mag Vuong today. Electronically signed by DEEPTHI Mccann on 3/31/2021 at 2:07 PM  Note: This report was completed using Hublished voiced recognition software. Every effort has been made to ensure accuracy; however, inadvertent computerized transcription errors may be present.

## 2021-03-31 NOTE — TELEPHONE ENCOUNTER
Received call from pt's FRANCISCA, requesting PT order. Order pended and routed for decision and signature.

## 2021-03-31 NOTE — PATIENT INSTRUCTIONS
OK for weightbearing as tolerated on the left lower extremity. Patient states that he is at the nursing facility center now permanently but has not been doing much as far as walking due to the pandemic. From our standpoint, he can and the patient would like to start doing more walking with the walker and assistance at the PT facility that is attached to the nursing home. Also continue working on general upper and lower extremity conditioning exercises. Follow-up on an as-needed basis and he was advised to contact the office if he has any problems or concerns    Patient reviewed and discussed with Dr. Naz Tovar today.

## 2021-05-08 NOTE — TELEPHONE ENCOUNTER
Order faxed at this time. Physical Therapy  Visit Type: initial evaluation  Precautions:  Medical precautions: ; standard precautions.   Lines:     Basic: telemetry and capped IV    Complex: J.P. drain      Lines in chart and on patient reviewed, cautions maintained throughout session.  Hearing: no hearing deficits    SUBJECTIVE  Patient agreed to participate in therapy this date.  Patient / Family Goal: maximize function and return home     OBJECTIVE     Oriented to person, place, time and situation     Arousal alertness: appropriate responses to stimuli    Affect/Behavior: alert, cooperative and pleasant  Patient activity tolerance: 1 to 1 activity to rest  Functional Communication/Cognition    Overall status:  Within functional limits    Bed Mobility:        Supine to sit: modified independent    Sit to supine: modified independent  Training completed:      HOB elevated ~ 30  degrees  Transfers:    Assistive devices: gait belt    Sit to stand: independent    Stand to sit: independent    Stand pivot: modified independent  Gait/Ambulation:     Assistance: supervision and contact guard/touching/steadying assist   Assistive device: 1 person and gait belt    Distance (ft): 120; 120    Type: decreased fatou and shuffling  Training Completed:      descreased fatou, no overt LOB-  Close supervision/ CGA  For  Balance and safety   Stair Mobility:    Number of steps: 8;     Assistance: supervision and contact guard/touching/steadying assist    Rails: bilateral rails (has 1 rail and home)    Pattern: reciprocal  Training completed:    Tasks: stair training    Education details: body mechanics and patient safety      Interventions     Training provided: activity tolerance, balance retraining, bed mobility training, body mechanics, functional ambulation, gait training, transfer training, stair training and safety training    Skilled input: Verbal instruction/cues, tactile instruction/cues and posture correction  Verbal Consent: Writer verbally  educated and received verbal consent for hand placement, positioning of patient, and techniques to be performed today from patient        ASSESSMENT    Impairments: strength, balance deficits, activity tolerance and pain  Functional Limitations: stair climbing and ambulation     Discharge Recommendations  Recommendation for Discharge: PT WI: Home         PT/OT Mobility Equipment for Discharge: owns ww      PT Identified Barriers to Discharge: medical  needs,  pain,  generalized weakness   Skilled therapy is required to address these limitations in attempt to maximize the patient's independence.  Predicted patient presentation: Low (stable) - Patient comorbidities and complexities, as defined above, will have little effect on progress for prescribed plan of care.    End of Session:   Location: in bed  Safety measures: call light within reach    PLAN    Suggestions for next session as indicated: Transfers,  progress ambulation  Distances without device,  Stairs (5) ,  Activity tolerance       Frequency Comments: SUN (pend D/C) M -F 5/6    Interventions: balance, functional transfer training, stairs retraining, endurance training, equipment eval/education, gait training and strengthening  Agreement to plan and goals: patient agrees with goals and treatment plan        GOALS  Review Date: 5/15/2021  Long Term Goals: (to be met by time of discharge from hospital)  Ambulation (even): Patient will ambulate on even surface for 250 feet with independent.   Stairs: Patient will ambulate 5 steps with using one rail, modified independent.     Documented in the chart in the following areas: Prior Level of Function. Pain. Assessment.      Therapy procedure time and total treatment time can be found documented on the Time Entry flowsheet

## 2021-06-18 PROBLEM — S82.242A CLOSED DISPLACED SPIRAL FRACTURE OF SHAFT OF LEFT TIBIA: Status: RESOLVED | Noted: 2019-05-15 | Resolved: 2021-01-01

## 2021-06-18 PROBLEM — Z95.1 S/P CABG (CORONARY ARTERY BYPASS GRAFT): Status: ACTIVE | Noted: 2021-01-01

## 2021-06-18 PROBLEM — S82.142D: Status: RESOLVED | Noted: 2019-05-23 | Resolved: 2021-01-01

## 2022-01-01 ENCOUNTER — APPOINTMENT (OUTPATIENT)
Dept: OTHER | Age: 87
End: 2022-01-01
Payer: COMMERCIAL

## 2022-01-01 ENCOUNTER — ANESTHESIA EVENT (OUTPATIENT)
Dept: CARDIAC CATH/INVASIVE PROCEDURES | Age: 87
DRG: 871 | End: 2022-01-01
Payer: COMMERCIAL

## 2022-01-01 ENCOUNTER — ANESTHESIA (OUTPATIENT)
Dept: CARDIAC CATH/INVASIVE PROCEDURES | Age: 87
DRG: 871 | End: 2022-01-01
Payer: COMMERCIAL

## 2022-01-01 ENCOUNTER — HOSPITAL ENCOUNTER (INPATIENT)
Age: 87
LOS: 11 days | Discharge: NURSING, REHAB OR LONG-TERM CARE | DRG: 871 | End: 2022-04-19
Attending: STUDENT IN AN ORGANIZED HEALTH CARE EDUCATION/TRAINING PROGRAM | Admitting: FAMILY MEDICINE
Payer: COMMERCIAL

## 2022-01-01 ENCOUNTER — HOSPITAL ENCOUNTER (OUTPATIENT)
Dept: OTHER | Age: 87
Setting detail: THERAPIES SERIES
Discharge: HOME | End: 2022-04-27
Payer: COMMERCIAL

## 2022-01-01 ENCOUNTER — APPOINTMENT (OUTPATIENT)
Dept: CT IMAGING | Age: 87
DRG: 871 | End: 2022-01-01
Payer: COMMERCIAL

## 2022-01-01 ENCOUNTER — APPOINTMENT (OUTPATIENT)
Dept: GENERAL RADIOLOGY | Age: 87
DRG: 871 | End: 2022-01-01
Payer: COMMERCIAL

## 2022-01-01 ENCOUNTER — APPOINTMENT (OUTPATIENT)
Dept: CARDIAC CATH/INVASIVE PROCEDURES | Age: 87
DRG: 871 | End: 2022-01-01
Payer: COMMERCIAL

## 2022-01-01 ENCOUNTER — HOSPITAL ENCOUNTER (OUTPATIENT)
Dept: OTHER | Age: 87
Setting detail: THERAPIES SERIES
Discharge: HOME | End: 2022-05-09
Payer: COMMERCIAL

## 2022-01-01 ENCOUNTER — APPOINTMENT (OUTPATIENT)
Dept: ULTRASOUND IMAGING | Age: 87
DRG: 871 | End: 2022-01-01
Payer: COMMERCIAL

## 2022-01-01 ENCOUNTER — OFFICE VISIT (OUTPATIENT)
Dept: CARDIOLOGY CLINIC | Age: 87
End: 2022-01-01
Payer: COMMERCIAL

## 2022-01-01 VITALS
DIASTOLIC BLOOD PRESSURE: 50 MMHG | SYSTOLIC BLOOD PRESSURE: 98 MMHG | WEIGHT: 177 LBS | OXYGEN SATURATION: 93 % | RESPIRATION RATE: 20 BRPM | BODY MASS INDEX: 23.46 KG/M2 | HEART RATE: 66 BPM | HEIGHT: 73 IN

## 2022-01-01 VITALS
SYSTOLIC BLOOD PRESSURE: 132 MMHG | DIASTOLIC BLOOD PRESSURE: 58 MMHG | OXYGEN SATURATION: 94 % | RESPIRATION RATE: 18 BRPM | HEART RATE: 66 BPM

## 2022-01-01 VITALS
HEART RATE: 62 BPM | WEIGHT: 180 LBS | OXYGEN SATURATION: 92 % | SYSTOLIC BLOOD PRESSURE: 96 MMHG | DIASTOLIC BLOOD PRESSURE: 51 MMHG | RESPIRATION RATE: 18 BRPM | BODY MASS INDEX: 23.75 KG/M2

## 2022-01-01 VITALS
TEMPERATURE: 98.1 F | SYSTOLIC BLOOD PRESSURE: 118 MMHG | HEIGHT: 73 IN | RESPIRATION RATE: 18 BRPM | WEIGHT: 194 LBS | DIASTOLIC BLOOD PRESSURE: 55 MMHG | OXYGEN SATURATION: 93 % | BODY MASS INDEX: 25.71 KG/M2 | HEART RATE: 79 BPM

## 2022-01-01 VITALS
RESPIRATION RATE: 18 BRPM | DIASTOLIC BLOOD PRESSURE: 54 MMHG | SYSTOLIC BLOOD PRESSURE: 92 MMHG | OXYGEN SATURATION: 92 %

## 2022-01-01 VITALS
SYSTOLIC BLOOD PRESSURE: 95 MMHG | RESPIRATION RATE: 24 BRPM | OXYGEN SATURATION: 95 % | DIASTOLIC BLOOD PRESSURE: 62 MMHG

## 2022-01-01 DIAGNOSIS — R00.0 TACHYCARDIA: ICD-10-CM

## 2022-01-01 DIAGNOSIS — I50.9 CONGESTIVE HEART FAILURE, UNSPECIFIED HF CHRONICITY, UNSPECIFIED HEART FAILURE TYPE (HCC): ICD-10-CM

## 2022-01-01 DIAGNOSIS — J18.9 PNEUMONIA DUE TO INFECTIOUS ORGANISM, UNSPECIFIED LATERALITY, UNSPECIFIED PART OF LUNG: Primary | ICD-10-CM

## 2022-01-01 DIAGNOSIS — I50.9 HEART FAILURE, UNSPECIFIED HF CHRONICITY, UNSPECIFIED HEART FAILURE TYPE (HCC): Primary | ICD-10-CM

## 2022-01-01 DIAGNOSIS — N30.01 ACUTE CYSTITIS WITH HEMATURIA: ICD-10-CM

## 2022-01-01 LAB
ALBUMIN SERPL-MCNC: 2.9 G/DL (ref 3.5–5.2)
ALBUMIN SERPL-MCNC: 2.9 G/DL (ref 3.5–5.2)
ALBUMIN SERPL-MCNC: 3.6 G/DL (ref 3.5–5.2)
ALP BLD-CCNC: 101 U/L (ref 40–129)
ALP BLD-CCNC: 116 U/L (ref 40–129)
ALP BLD-CCNC: 86 U/L (ref 40–129)
ALT SERPL-CCNC: 25 U/L (ref 0–40)
ALT SERPL-CCNC: 36 U/L (ref 0–40)
ALT SERPL-CCNC: 36 U/L (ref 0–40)
ANION GAP SERPL CALCULATED.3IONS-SCNC: 10 MMOL/L (ref 7–16)
ANION GAP SERPL CALCULATED.3IONS-SCNC: 10 MMOL/L (ref 7–16)
ANION GAP SERPL CALCULATED.3IONS-SCNC: 11 MMOL/L (ref 7–16)
ANION GAP SERPL CALCULATED.3IONS-SCNC: 12 MMOL/L (ref 7–16)
ANION GAP SERPL CALCULATED.3IONS-SCNC: 13 MMOL/L (ref 7–16)
ANION GAP SERPL CALCULATED.3IONS-SCNC: 13 MMOL/L (ref 7–16)
ANION GAP SERPL CALCULATED.3IONS-SCNC: 15 MMOL/L (ref 7–16)
ANION GAP SERPL CALCULATED.3IONS-SCNC: 21 MMOL/L (ref 7–16)
ANION GAP SERPL CALCULATED.3IONS-SCNC: 8 MMOL/L (ref 7–16)
ANION GAP SERPL CALCULATED.3IONS-SCNC: 8 MMOL/L (ref 7–16)
ANION GAP SERPL CALCULATED.3IONS-SCNC: 9 MMOL/L (ref 7–16)
ANION GAP SERPL CALCULATED.3IONS-SCNC: 9 MMOL/L (ref 7–16)
ANISOCYTOSIS: ABNORMAL
AST SERPL-CCNC: 19 U/L (ref 0–39)
AST SERPL-CCNC: 36 U/L (ref 0–39)
AST SERPL-CCNC: 39 U/L (ref 0–39)
BACTERIA: ABNORMAL /HPF
BASOPHILS ABSOLUTE: 0 E9/L (ref 0–0.2)
BASOPHILS ABSOLUTE: 0.28 E9/L (ref 0–0.2)
BASOPHILS RELATIVE PERCENT: 0 % (ref 0–2)
BASOPHILS RELATIVE PERCENT: 0.3 % (ref 0–2)
BASOPHILS RELATIVE PERCENT: 0.5 % (ref 0–2)
BASOPHILS RELATIVE PERCENT: 1.7 % (ref 0–2)
BILIRUB SERPL-MCNC: 0.7 MG/DL (ref 0–1.2)
BILIRUB SERPL-MCNC: 0.8 MG/DL (ref 0–1.2)
BILIRUB SERPL-MCNC: 1 MG/DL (ref 0–1.2)
BILIRUBIN URINE: NEGATIVE
BILIRUBIN URINE: NEGATIVE
BLOOD CULTURE, ROUTINE: NORMAL
BLOOD, URINE: ABNORMAL
BLOOD, URINE: NEGATIVE
BUN BLDV-MCNC: 24 MG/DL (ref 6–23)
BUN BLDV-MCNC: 26 MG/DL (ref 6–23)
BUN BLDV-MCNC: 27 MG/DL (ref 6–23)
BUN BLDV-MCNC: 28 MG/DL (ref 6–23)
BUN BLDV-MCNC: 28 MG/DL (ref 6–23)
BUN BLDV-MCNC: 30 MG/DL (ref 6–23)
BUN BLDV-MCNC: 30 MG/DL (ref 6–23)
BUN BLDV-MCNC: 31 MG/DL (ref 6–23)
BUN BLDV-MCNC: 31 MG/DL (ref 6–23)
BUN BLDV-MCNC: 33 MG/DL (ref 6–23)
BUN BLDV-MCNC: 34 MG/DL (ref 6–23)
BUN BLDV-MCNC: 38 MG/DL (ref 6–23)
BUN BLDV-MCNC: 39 MG/DL (ref 6–23)
BUN BLDV-MCNC: 42 MG/DL (ref 6–23)
BURR CELLS: ABNORMAL
BURR CELLS: ABNORMAL
CALCIUM SERPL-MCNC: 8.9 MG/DL (ref 8.6–10.2)
CALCIUM SERPL-MCNC: 9 MG/DL (ref 8.6–10.2)
CALCIUM SERPL-MCNC: 9 MG/DL (ref 8.6–10.2)
CALCIUM SERPL-MCNC: 9.1 MG/DL (ref 8.6–10.2)
CALCIUM SERPL-MCNC: 9.3 MG/DL (ref 8.6–10.2)
CALCIUM SERPL-MCNC: 9.4 MG/DL (ref 8.6–10.2)
CALCIUM SERPL-MCNC: 9.6 MG/DL (ref 8.6–10.2)
CALCIUM SERPL-MCNC: 9.6 MG/DL (ref 8.6–10.2)
CALCIUM SERPL-MCNC: 9.7 MG/DL (ref 8.6–10.2)
CHLORIDE BLD-SCNC: 100 MMOL/L (ref 98–107)
CHLORIDE BLD-SCNC: 103 MMOL/L (ref 98–107)
CHLORIDE BLD-SCNC: 103 MMOL/L (ref 98–107)
CHLORIDE BLD-SCNC: 104 MMOL/L (ref 98–107)
CHLORIDE BLD-SCNC: 104 MMOL/L (ref 98–107)
CHLORIDE BLD-SCNC: 105 MMOL/L (ref 98–107)
CHLORIDE BLD-SCNC: 105 MMOL/L (ref 98–107)
CHLORIDE BLD-SCNC: 106 MMOL/L (ref 98–107)
CHLORIDE BLD-SCNC: 97 MMOL/L (ref 98–107)
CHLORIDE BLD-SCNC: 98 MMOL/L (ref 98–107)
CHLORIDE BLD-SCNC: 99 MMOL/L (ref 98–107)
CHLORIDE BLD-SCNC: 99 MMOL/L (ref 98–107)
CHLORIDE URINE RANDOM: 50 MMOL/L
CHOLESTEROL, TOTAL: 93 MG/DL (ref 0–199)
CLARITY: ABNORMAL
CLARITY: CLEAR
CO2: 20 MMOL/L (ref 22–29)
CO2: 21 MMOL/L (ref 22–29)
CO2: 24 MMOL/L (ref 22–29)
CO2: 25 MMOL/L (ref 22–29)
CO2: 26 MMOL/L (ref 22–29)
CO2: 27 MMOL/L (ref 22–29)
CO2: 28 MMOL/L (ref 22–29)
CO2: 29 MMOL/L (ref 22–29)
COLOR: YELLOW
COLOR: YELLOW
CREAT SERPL-MCNC: 1.1 MG/DL (ref 0.7–1.2)
CREAT SERPL-MCNC: 1.1 MG/DL (ref 0.7–1.2)
CREAT SERPL-MCNC: 1.2 MG/DL (ref 0.7–1.2)
CREAT SERPL-MCNC: 1.2 MG/DL (ref 0.7–1.2)
CREAT SERPL-MCNC: 1.3 MG/DL (ref 0.7–1.2)
CREAT SERPL-MCNC: 1.4 MG/DL (ref 0.7–1.2)
CREAT SERPL-MCNC: 1.4 MG/DL (ref 0.7–1.2)
CREAT SERPL-MCNC: 1.5 MG/DL (ref 0.7–1.2)
CREAT SERPL-MCNC: 1.5 MG/DL (ref 0.7–1.2)
CREAT SERPL-MCNC: 1.7 MG/DL (ref 0.7–1.2)
CREAT SERPL-MCNC: 1.7 MG/DL (ref 0.7–1.2)
CREATININE URINE: 28 MG/DL (ref 40–278)
CREATININE URINE: 29 MG/DL (ref 40–278)
CULTURE, BLOOD 2: NORMAL
CULTURE, RESPIRATORY: ABNORMAL
CULTURE, RESPIRATORY: ABNORMAL
EKG ATRIAL RATE: 110 BPM
EKG ATRIAL RATE: 124 BPM
EKG ATRIAL RATE: 125 BPM
EKG ATRIAL RATE: 126 BPM
EKG P AXIS: 57 DEGREES
EKG P AXIS: 57 DEGREES
EKG P-R INTERVAL: 104 MS
EKG P-R INTERVAL: 174 MS
EKG P-R INTERVAL: 234 MS
EKG P-R INTERVAL: 96 MS
EKG Q-T INTERVAL: 330 MS
EKG Q-T INTERVAL: 332 MS
EKG Q-T INTERVAL: 350 MS
EKG Q-T INTERVAL: 368 MS
EKG QRS DURATION: 100 MS
EKG QRS DURATION: 106 MS
EKG QRS DURATION: 94 MS
EKG QRS DURATION: 98 MS
EKG QTC CALCULATION (BAZETT): 446 MS
EKG QTC CALCULATION (BAZETT): 473 MS
EKG QTC CALCULATION (BAZETT): 506 MS
EKG QTC CALCULATION (BAZETT): 528 MS
EKG R AXIS: 16 DEGREES
EKG R AXIS: 50 DEGREES
EKG R AXIS: 63 DEGREES
EKG R AXIS: 67 DEGREES
EKG T AXIS: -148 DEGREES
EKG T AXIS: 112 DEGREES
EKG T AXIS: 17 DEGREES
EKG T AXIS: 18 DEGREES
EKG VENTRICULAR RATE: 110 BPM
EKG VENTRICULAR RATE: 122 BPM
EKG VENTRICULAR RATE: 124 BPM
EKG VENTRICULAR RATE: 126 BPM
EOSINOPHIL, URINE: 0 % (ref 0–1)
EOSINOPHILS ABSOLUTE: 0 E9/L (ref 0.05–0.5)
EOSINOPHILS ABSOLUTE: 0.13 E9/L (ref 0.05–0.5)
EOSINOPHILS ABSOLUTE: 0.14 E9/L (ref 0.05–0.5)
EOSINOPHILS ABSOLUTE: 0.37 E9/L (ref 0.05–0.5)
EOSINOPHILS ABSOLUTE: 0.43 E9/L (ref 0.05–0.5)
EOSINOPHILS RELATIVE PERCENT: 0 % (ref 0–6)
EOSINOPHILS RELATIVE PERCENT: 0.9 % (ref 0–6)
EOSINOPHILS RELATIVE PERCENT: 0.9 % (ref 0–6)
EOSINOPHILS RELATIVE PERCENT: 2 % (ref 0–6)
EOSINOPHILS RELATIVE PERCENT: 2.6 % (ref 0–6)
FOLATE: >20 NG/ML (ref 4.8–24.2)
GFR AFRICAN AMERICAN: 46
GFR AFRICAN AMERICAN: 46
GFR AFRICAN AMERICAN: 53
GFR AFRICAN AMERICAN: 53
GFR AFRICAN AMERICAN: 58
GFR AFRICAN AMERICAN: 58
GFR AFRICAN AMERICAN: >60
GFR NON-AFRICAN AMERICAN: 38 ML/MIN/1.73
GFR NON-AFRICAN AMERICAN: 38 ML/MIN/1.73
GFR NON-AFRICAN AMERICAN: 44 ML/MIN/1.73
GFR NON-AFRICAN AMERICAN: 44 ML/MIN/1.73
GFR NON-AFRICAN AMERICAN: 48 ML/MIN/1.73
GFR NON-AFRICAN AMERICAN: 48 ML/MIN/1.73
GFR NON-AFRICAN AMERICAN: 52 ML/MIN/1.73
GFR NON-AFRICAN AMERICAN: 57 ML/MIN/1.73
GFR NON-AFRICAN AMERICAN: 57 ML/MIN/1.73
GFR NON-AFRICAN AMERICAN: >60 ML/MIN/1.73
GFR NON-AFRICAN AMERICAN: >60 ML/MIN/1.73
GLUCOSE BLD-MCNC: 106 MG/DL (ref 74–99)
GLUCOSE BLD-MCNC: 108 MG/DL (ref 74–99)
GLUCOSE BLD-MCNC: 115 MG/DL (ref 74–99)
GLUCOSE BLD-MCNC: 118 MG/DL (ref 74–99)
GLUCOSE BLD-MCNC: 136 MG/DL (ref 74–99)
GLUCOSE BLD-MCNC: 137 MG/DL (ref 74–99)
GLUCOSE BLD-MCNC: 142 MG/DL (ref 74–99)
GLUCOSE BLD-MCNC: 142 MG/DL (ref 74–99)
GLUCOSE BLD-MCNC: 146 MG/DL (ref 74–99)
GLUCOSE BLD-MCNC: 147 MG/DL (ref 74–99)
GLUCOSE BLD-MCNC: 151 MG/DL (ref 74–99)
GLUCOSE BLD-MCNC: 155 MG/DL (ref 74–99)
GLUCOSE BLD-MCNC: 213 MG/DL (ref 74–99)
GLUCOSE BLD-MCNC: 390 MG/DL (ref 74–99)
GLUCOSE URINE: NEGATIVE MG/DL
GLUCOSE URINE: NEGATIVE MG/DL
HCT VFR BLD CALC: 36.3 % (ref 37–54)
HCT VFR BLD CALC: 36.5 % (ref 37–54)
HCT VFR BLD CALC: 38 % (ref 37–54)
HCT VFR BLD CALC: 39 % (ref 37–54)
HCT VFR BLD CALC: 39.3 % (ref 37–54)
HCT VFR BLD CALC: 39.3 % (ref 37–54)
HCT VFR BLD CALC: 39.4 % (ref 37–54)
HCT VFR BLD CALC: 40.3 % (ref 37–54)
HCT VFR BLD CALC: 42.4 % (ref 37–54)
HCT VFR BLD CALC: 43.6 % (ref 37–54)
HCT VFR BLD CALC: 45.2 % (ref 37–54)
HDLC SERPL-MCNC: 33 MG/DL
HEMOGLOBIN: 11.5 G/DL (ref 12.5–16.5)
HEMOGLOBIN: 11.6 G/DL (ref 12.5–16.5)
HEMOGLOBIN: 12 G/DL (ref 12.5–16.5)
HEMOGLOBIN: 12.5 G/DL (ref 12.5–16.5)
HEMOGLOBIN: 12.5 G/DL (ref 12.5–16.5)
HEMOGLOBIN: 12.6 G/DL (ref 12.5–16.5)
HEMOGLOBIN: 12.7 G/DL (ref 12.5–16.5)
HEMOGLOBIN: 13 G/DL (ref 12.5–16.5)
HEMOGLOBIN: 13.1 G/DL (ref 12.5–16.5)
HEMOGLOBIN: 13.6 G/DL (ref 12.5–16.5)
HEMOGLOBIN: 14 G/DL (ref 12.5–16.5)
HYPOCHROMIA: ABNORMAL
IRON SATURATION: 17 % (ref 20–55)
IRON: 40 MCG/DL (ref 59–158)
KETONES, URINE: NEGATIVE MG/DL
KETONES, URINE: NEGATIVE MG/DL
L. PNEUMOPHILA SEROGP 1 UR AG: NORMAL
L. PNEUMOPHILA SEROGP 1 UR AG: NORMAL
LACTIC ACID: 1.4 MMOL/L (ref 0.5–2.2)
LDL CHOLESTEROL CALCULATED: 44 MG/DL (ref 0–99)
LEUKOCYTE ESTERASE, URINE: ABNORMAL
LEUKOCYTE ESTERASE, URINE: NEGATIVE
LV EF: 53 %
LVEF MODALITY: NORMAL
LYMPHOCYTES ABSOLUTE: 1.22 E9/L (ref 1.5–4)
LYMPHOCYTES ABSOLUTE: 1.49 E9/L (ref 1.5–4)
LYMPHOCYTES ABSOLUTE: 10.15 E9/L (ref 1.5–4)
LYMPHOCYTES ABSOLUTE: 12.92 E9/L (ref 1.5–4)
LYMPHOCYTES ABSOLUTE: 3.55 E9/L (ref 1.5–4)
LYMPHOCYTES ABSOLUTE: 5.52 E9/L (ref 1.5–4)
LYMPHOCYTES ABSOLUTE: 7.3 E9/L (ref 1.5–4)
LYMPHOCYTES RELATIVE PERCENT: 24.6 % (ref 20–42)
LYMPHOCYTES RELATIVE PERCENT: 30 % (ref 20–42)
LYMPHOCYTES RELATIVE PERCENT: 57 % (ref 20–42)
LYMPHOCYTES RELATIVE PERCENT: 70 % (ref 20–42)
LYMPHOCYTES RELATIVE PERCENT: 73 % (ref 20–42)
LYMPHOCYTES RELATIVE PERCENT: 8 % (ref 20–42)
LYMPHOCYTES RELATIVE PERCENT: 8.7 % (ref 20–42)
MAGNESIUM: 1.8 MG/DL (ref 1.6–2.6)
MAGNESIUM: 2 MG/DL (ref 1.6–2.6)
MAGNESIUM: 2 MG/DL (ref 1.6–2.6)
MAGNESIUM: 2.1 MG/DL (ref 1.6–2.6)
MAGNESIUM: 2.1 MG/DL (ref 1.6–2.6)
MAGNESIUM: 2.2 MG/DL (ref 1.6–2.6)
MCH RBC QN AUTO: 29.9 PG (ref 26–35)
MCH RBC QN AUTO: 30 PG (ref 26–35)
MCH RBC QN AUTO: 30.1 PG (ref 26–35)
MCH RBC QN AUTO: 30.2 PG (ref 26–35)
MCH RBC QN AUTO: 30.3 PG (ref 26–35)
MCH RBC QN AUTO: 30.4 PG (ref 26–35)
MCH RBC QN AUTO: 30.7 PG (ref 26–35)
MCH RBC QN AUTO: 31.1 PG (ref 26–35)
MCH RBC QN AUTO: 31.1 PG (ref 26–35)
MCHC RBC AUTO-ENTMCNC: 30.9 % (ref 32–34.5)
MCHC RBC AUTO-ENTMCNC: 31 % (ref 32–34.5)
MCHC RBC AUTO-ENTMCNC: 31.2 % (ref 32–34.5)
MCHC RBC AUTO-ENTMCNC: 31.5 % (ref 32–34.5)
MCHC RBC AUTO-ENTMCNC: 31.6 % (ref 32–34.5)
MCHC RBC AUTO-ENTMCNC: 31.8 % (ref 32–34.5)
MCHC RBC AUTO-ENTMCNC: 32 % (ref 32–34.5)
MCHC RBC AUTO-ENTMCNC: 32 % (ref 32–34.5)
MCHC RBC AUTO-ENTMCNC: 32.1 % (ref 32–34.5)
MCHC RBC AUTO-ENTMCNC: 32.3 % (ref 32–34.5)
MCHC RBC AUTO-ENTMCNC: 32.3 % (ref 32–34.5)
MCV RBC AUTO: 93.6 FL (ref 80–99.9)
MCV RBC AUTO: 94.7 FL (ref 80–99.9)
MCV RBC AUTO: 94.9 FL (ref 80–99.9)
MCV RBC AUTO: 95.5 FL (ref 80–99.9)
MCV RBC AUTO: 95.7 FL (ref 80–99.9)
MCV RBC AUTO: 95.9 FL (ref 80–99.9)
MCV RBC AUTO: 96.1 FL (ref 80–99.9)
MCV RBC AUTO: 96.4 FL (ref 80–99.9)
MCV RBC AUTO: 96.9 FL (ref 80–99.9)
MCV RBC AUTO: 97.2 FL (ref 80–99.9)
MCV RBC AUTO: 98 FL (ref 80–99.9)
METER GLUCOSE: 100 MG/DL (ref 74–99)
METER GLUCOSE: 108 MG/DL (ref 74–99)
METER GLUCOSE: 120 MG/DL (ref 74–99)
METER GLUCOSE: 127 MG/DL (ref 74–99)
METER GLUCOSE: 130 MG/DL (ref 74–99)
METER GLUCOSE: 132 MG/DL (ref 74–99)
METER GLUCOSE: 134 MG/DL (ref 74–99)
METER GLUCOSE: 138 MG/DL (ref 74–99)
METER GLUCOSE: 141 MG/DL (ref 74–99)
METER GLUCOSE: 143 MG/DL (ref 74–99)
METER GLUCOSE: 149 MG/DL (ref 74–99)
METER GLUCOSE: 150 MG/DL (ref 74–99)
METER GLUCOSE: 152 MG/DL (ref 74–99)
METER GLUCOSE: 156 MG/DL (ref 74–99)
METER GLUCOSE: 160 MG/DL (ref 74–99)
METER GLUCOSE: 161 MG/DL (ref 74–99)
METER GLUCOSE: 161 MG/DL (ref 74–99)
METER GLUCOSE: 162 MG/DL (ref 74–99)
METER GLUCOSE: 162 MG/DL (ref 74–99)
METER GLUCOSE: 164 MG/DL (ref 74–99)
METER GLUCOSE: 165 MG/DL (ref 74–99)
METER GLUCOSE: 166 MG/DL (ref 74–99)
METER GLUCOSE: 166 MG/DL (ref 74–99)
METER GLUCOSE: 167 MG/DL (ref 74–99)
METER GLUCOSE: 168 MG/DL (ref 74–99)
METER GLUCOSE: 169 MG/DL (ref 74–99)
METER GLUCOSE: 175 MG/DL (ref 74–99)
METER GLUCOSE: 178 MG/DL (ref 74–99)
METER GLUCOSE: 178 MG/DL (ref 74–99)
METER GLUCOSE: 183 MG/DL (ref 74–99)
METER GLUCOSE: 193 MG/DL (ref 74–99)
METER GLUCOSE: 196 MG/DL (ref 74–99)
METER GLUCOSE: 200 MG/DL (ref 74–99)
METER GLUCOSE: 202 MG/DL (ref 74–99)
METER GLUCOSE: 204 MG/DL (ref 74–99)
METER GLUCOSE: 208 MG/DL (ref 74–99)
METER GLUCOSE: 259 MG/DL (ref 74–99)
METER GLUCOSE: 267 MG/DL (ref 74–99)
METER GLUCOSE: 293 MG/DL (ref 74–99)
METER GLUCOSE: 308 MG/DL (ref 74–99)
MONOCYTES ABSOLUTE: 0.29 E9/L (ref 0.1–0.95)
MONOCYTES ABSOLUTE: 0.35 E9/L (ref 0.1–0.95)
MONOCYTES ABSOLUTE: 0.46 E9/L (ref 0.1–0.95)
MONOCYTES ABSOLUTE: 0.51 E9/L (ref 0.1–0.95)
MONOCYTES ABSOLUTE: 0.74 E9/L (ref 0.1–0.95)
MONOCYTES ABSOLUTE: 0.99 E9/L (ref 0.1–0.95)
MONOCYTES ABSOLUTE: 1.83 E9/L (ref 0.1–0.95)
MONOCYTES RELATIVE PERCENT: 11.3 % (ref 2–12)
MONOCYTES RELATIVE PERCENT: 2 % (ref 2–12)
MONOCYTES RELATIVE PERCENT: 2 % (ref 2–12)
MONOCYTES RELATIVE PERCENT: 2.7 % (ref 2–12)
MONOCYTES RELATIVE PERCENT: 4 % (ref 2–12)
MONOCYTES RELATIVE PERCENT: 4 % (ref 2–12)
MONOCYTES RELATIVE PERCENT: 7 % (ref 2–12)
MRSA CULTURE ONLY: NORMAL
NEUTROPHILS ABSOLUTE: 11.78 E9/L (ref 1.8–7.3)
NEUTROPHILS ABSOLUTE: 12.62 E9/L (ref 1.8–7.3)
NEUTROPHILS ABSOLUTE: 13.38 E9/L (ref 1.8–7.3)
NEUTROPHILS ABSOLUTE: 4.06 E9/L (ref 1.8–7.3)
NEUTROPHILS ABSOLUTE: 4.43 E9/L (ref 1.8–7.3)
NEUTROPHILS ABSOLUTE: 4.99 E9/L (ref 1.8–7.3)
NEUTROPHILS ABSOLUTE: 9.66 E9/L (ref 1.8–7.3)
NEUTROPHILS RELATIVE PERCENT: 25 % (ref 43–80)
NEUTROPHILS RELATIVE PERCENT: 28 % (ref 43–80)
NEUTROPHILS RELATIVE PERCENT: 39 % (ref 43–80)
NEUTROPHILS RELATIVE PERCENT: 64 % (ref 43–80)
NEUTROPHILS RELATIVE PERCENT: 67.5 % (ref 43–80)
NEUTROPHILS RELATIVE PERCENT: 75.7 % (ref 43–80)
NEUTROPHILS RELATIVE PERCENT: 88.4 % (ref 43–80)
NITRITE, URINE: NEGATIVE
NITRITE, URINE: POSITIVE
NUCLEATED RED BLOOD CELLS: 0.9 /100 WBC
ORGANISM: ABNORMAL
ORGANISM: ABNORMAL
OSMOLALITY URINE: 221 MOSM/KG (ref 300–900)
OVALOCYTES: ABNORMAL
PDW BLD-RTO: 13.8 FL (ref 11.5–15)
PDW BLD-RTO: 13.9 FL (ref 11.5–15)
PDW BLD-RTO: 14 FL (ref 11.5–15)
PDW BLD-RTO: 14 FL (ref 11.5–15)
PDW BLD-RTO: 14.2 FL (ref 11.5–15)
PDW BLD-RTO: 14.5 FL (ref 11.5–15)
PDW BLD-RTO: 14.6 FL (ref 11.5–15)
PH UA: 6 (ref 5–9)
PH UA: 6 (ref 5–9)
PHOSPHORUS: 3 MG/DL (ref 2.5–4.5)
PHOSPHORUS: 3 MG/DL (ref 2.5–4.5)
PHOSPHORUS: 3.8 MG/DL (ref 2.5–4.5)
PLATELET # BLD: 109 E9/L (ref 130–450)
PLATELET # BLD: 110 E9/L (ref 130–450)
PLATELET # BLD: 110 E9/L (ref 130–450)
PLATELET # BLD: 111 E9/L (ref 130–450)
PLATELET # BLD: 116 E9/L (ref 130–450)
PLATELET # BLD: 121 E9/L (ref 130–450)
PLATELET # BLD: 122 E9/L (ref 130–450)
PLATELET # BLD: 122 E9/L (ref 130–450)
PLATELET # BLD: 128 E9/L (ref 130–450)
PLATELET # BLD: 129 E9/L (ref 130–450)
PLATELET # BLD: 130 E9/L (ref 130–450)
PMV BLD AUTO: 10 FL (ref 7–12)
PMV BLD AUTO: 10 FL (ref 7–12)
PMV BLD AUTO: 10.2 FL (ref 7–12)
PMV BLD AUTO: 10.2 FL (ref 7–12)
PMV BLD AUTO: 9.7 FL (ref 7–12)
PMV BLD AUTO: 9.8 FL (ref 7–12)
PMV BLD AUTO: 9.9 FL (ref 7–12)
POIKILOCYTES: ABNORMAL
POLYCHROMASIA: ABNORMAL
POTASSIUM REFLEX MAGNESIUM: 4.4 MMOL/L (ref 3.5–5)
POTASSIUM SERPL-SCNC: 3.8 MMOL/L (ref 3.5–5)
POTASSIUM SERPL-SCNC: 4 MMOL/L (ref 3.5–5)
POTASSIUM SERPL-SCNC: 4 MMOL/L (ref 3.5–5)
POTASSIUM SERPL-SCNC: 4.1 MMOL/L (ref 3.5–5)
POTASSIUM SERPL-SCNC: 4.2 MMOL/L (ref 3.5–5)
POTASSIUM SERPL-SCNC: 4.2 MMOL/L (ref 3.5–5)
POTASSIUM SERPL-SCNC: 4.3 MMOL/L (ref 3.5–5)
POTASSIUM SERPL-SCNC: 4.4 MMOL/L (ref 3.5–5)
POTASSIUM SERPL-SCNC: 4.4 MMOL/L (ref 3.5–5)
POTASSIUM SERPL-SCNC: 4.6 MMOL/L (ref 3.5–5)
POTASSIUM SERPL-SCNC: 4.6 MMOL/L (ref 3.5–5)
POTASSIUM SERPL-SCNC: 4.9 MMOL/L (ref 3.5–5)
POTASSIUM SERPL-SCNC: 4.9 MMOL/L (ref 3.5–5)
PRO-BNP: 3513 PG/ML (ref 0–450)
PRO-BNP: 3612 PG/ML (ref 0–450)
PRO-BNP: 4371 PG/ML (ref 0–450)
PRO-BNP: 4495 PG/ML (ref 0–450)
PRO-BNP: 4628 PG/ML (ref 0–450)
PROCALCITONIN: 0.09 NG/ML (ref 0–0.08)
PROCALCITONIN: 0.27 NG/ML (ref 0–0.08)
PROCALCITONIN: 0.37 NG/ML (ref 0–0.08)
PROTEIN PROTEIN: 8 MG/DL (ref 0–12)
PROTEIN UA: NEGATIVE MG/DL
PROTEIN UA: NEGATIVE MG/DL
PROTEIN/CREAT RATIO: 0.3
PROTEIN/CREAT RATIO: 0.3 (ref 0–0.2)
RBC # BLD: 3.82 E12/L (ref 3.8–5.8)
RBC # BLD: 3.88 E12/L (ref 3.8–5.8)
RBC # BLD: 3.97 E12/L (ref 3.8–5.8)
RBC # BLD: 4.09 E12/L (ref 3.8–5.8)
RBC # BLD: 4.11 E12/L (ref 3.8–5.8)
RBC # BLD: 4.12 E12/L (ref 3.8–5.8)
RBC # BLD: 4.14 E12/L (ref 3.8–5.8)
RBC # BLD: 4.18 E12/L (ref 3.8–5.8)
RBC # BLD: 4.36 E12/L (ref 3.8–5.8)
RBC # BLD: 4.5 E12/L (ref 3.8–5.8)
RBC # BLD: 4.61 E12/L (ref 3.8–5.8)
RBC UA: ABNORMAL /HPF (ref 0–2)
REASON FOR REJECTION: NORMAL
REJECTED TEST: NORMAL
SARS-COV-2, NAAT: NOT DETECTED
SARS-COV-2, NAAT: NOT DETECTED
SMEAR, RESPIRATORY: ABNORMAL
SMUDGE CELLS: ABNORMAL
SODIUM BLD-SCNC: 134 MMOL/L (ref 132–146)
SODIUM BLD-SCNC: 135 MMOL/L (ref 132–146)
SODIUM BLD-SCNC: 137 MMOL/L (ref 132–146)
SODIUM BLD-SCNC: 137 MMOL/L (ref 132–146)
SODIUM BLD-SCNC: 138 MMOL/L (ref 132–146)
SODIUM BLD-SCNC: 139 MMOL/L (ref 132–146)
SODIUM BLD-SCNC: 139 MMOL/L (ref 132–146)
SODIUM BLD-SCNC: 141 MMOL/L (ref 132–146)
SODIUM BLD-SCNC: 142 MMOL/L (ref 132–146)
SODIUM BLD-SCNC: 144 MMOL/L (ref 132–146)
SODIUM URINE: 46 MMOL/L
SPECIFIC GRAVITY UA: 1.01 (ref 1–1.03)
SPECIFIC GRAVITY UA: 1.01 (ref 1–1.03)
STREP PNEUMONIAE ANTIGEN, URINE: NORMAL
STREP PNEUMONIAE ANTIGEN, URINE: NORMAL
TEAR DROP CELLS: ABNORMAL
TOTAL IRON BINDING CAPACITY: 231 MCG/DL (ref 250–450)
TOTAL PROTEIN: 5.4 G/DL (ref 6.4–8.3)
TOTAL PROTEIN: 5.5 G/DL (ref 6.4–8.3)
TOTAL PROTEIN: 5.6 G/DL (ref 6.4–8.3)
TRIGL SERPL-MCNC: 81 MG/DL (ref 0–149)
TROPONIN, HIGH SENSITIVITY: 43 NG/L (ref 0–11)
TROPONIN, HIGH SENSITIVITY: 47 NG/L (ref 0–11)
TSH SERPL DL<=0.05 MIU/L-ACNC: 1.1 UIU/ML (ref 0.27–4.2)
UREA NITROGEN, UR: 210 MG/DL (ref 800–1666)
URINE CULTURE, ROUTINE: ABNORMAL
UROBILINOGEN, URINE: 0.2 E.U./DL
UROBILINOGEN, URINE: 0.2 E.U./DL
VITAMIN B-12: >2000 PG/ML (ref 211–946)
VITAMIN D 25-HYDROXY: 49 NG/ML (ref 30–100)
VLDLC SERPL CALC-MCNC: 16 MG/DL
WBC # BLD: 12.8 E9/L (ref 4.5–11.5)
WBC # BLD: 14.2 E9/L (ref 4.5–11.5)
WBC # BLD: 14.5 E9/L (ref 4.5–11.5)
WBC # BLD: 15.2 E9/L (ref 4.5–11.5)
WBC # BLD: 15.7 E9/L (ref 4.5–11.5)
WBC # BLD: 16.6 E9/L (ref 4.5–11.5)
WBC # BLD: 17.6 E9/L (ref 4.5–11.5)
WBC # BLD: 17.7 E9/L (ref 4.5–11.5)
WBC # BLD: 17.9 E9/L (ref 4.5–11.5)
WBC # BLD: 18.4 E9/L (ref 4.5–11.5)
WBC # BLD: 18.4 E9/L (ref 4.5–11.5)
WBC UA: ABNORMAL /HPF (ref 0–5)

## 2022-01-01 PROCEDURE — 71045 X-RAY EXAM CHEST 1 VIEW: CPT

## 2022-01-01 PROCEDURE — 51702 INSERT TEMP BLADDER CATH: CPT

## 2022-01-01 PROCEDURE — 99232 SBSQ HOSP IP/OBS MODERATE 35: CPT

## 2022-01-01 PROCEDURE — 6370000000 HC RX 637 (ALT 250 FOR IP): Performed by: INTERNAL MEDICINE

## 2022-01-01 PROCEDURE — 2500000003 HC RX 250 WO HCPCS: Performed by: FAMILY MEDICINE

## 2022-01-01 PROCEDURE — 76705 ECHO EXAM OF ABDOMEN: CPT

## 2022-01-01 PROCEDURE — 6370000000 HC RX 637 (ALT 250 FOR IP): Performed by: PHYSICIAN ASSISTANT

## 2022-01-01 PROCEDURE — 87040 BLOOD CULTURE FOR BACTERIA: CPT

## 2022-01-01 PROCEDURE — 6370000000 HC RX 637 (ALT 250 FOR IP): Performed by: FAMILY MEDICINE

## 2022-01-01 PROCEDURE — 2700000000 HC OXYGEN THERAPY PER DAY

## 2022-01-01 PROCEDURE — 97530 THERAPEUTIC ACTIVITIES: CPT

## 2022-01-01 PROCEDURE — 83880 ASSAY OF NATRIURETIC PEPTIDE: CPT

## 2022-01-01 PROCEDURE — 6360000002 HC RX W HCPCS: Performed by: NURSE PRACTITIONER

## 2022-01-01 PROCEDURE — 2709999900 HC NON-CHARGEABLE SUPPLY

## 2022-01-01 PROCEDURE — 93010 ELECTROCARDIOGRAM REPORT: CPT | Performed by: INTERNAL MEDICINE

## 2022-01-01 PROCEDURE — 6360000002 HC RX W HCPCS: Performed by: INTERNAL MEDICINE

## 2022-01-01 PROCEDURE — 82962 GLUCOSE BLOOD TEST: CPT

## 2022-01-01 PROCEDURE — 6360000002 HC RX W HCPCS: Performed by: PHYSICIAN ASSISTANT

## 2022-01-01 PROCEDURE — 83540 ASSAY OF IRON: CPT

## 2022-01-01 PROCEDURE — 83735 ASSAY OF MAGNESIUM: CPT

## 2022-01-01 PROCEDURE — C1751 CATH, INF, PER/CENT/MIDLINE: HCPCS

## 2022-01-01 PROCEDURE — 36410 VNPNXR 3YR/> PHY/QHP DX/THER: CPT

## 2022-01-01 PROCEDURE — 94669 MECHANICAL CHEST WALL OSCILL: CPT

## 2022-01-01 PROCEDURE — 6360000002 HC RX W HCPCS: Performed by: FAMILY MEDICINE

## 2022-01-01 PROCEDURE — 6370000000 HC RX 637 (ALT 250 FOR IP): Performed by: NURSE PRACTITIONER

## 2022-01-01 PROCEDURE — 2580000003 HC RX 258: Performed by: FAMILY MEDICINE

## 2022-01-01 PROCEDURE — 76937 US GUIDE VASCULAR ACCESS: CPT

## 2022-01-01 PROCEDURE — 99222 1ST HOSP IP/OBS MODERATE 55: CPT | Performed by: INTERNAL MEDICINE

## 2022-01-01 PROCEDURE — 94640 AIRWAY INHALATION TREATMENT: CPT

## 2022-01-01 PROCEDURE — 2060000000 HC ICU INTERMEDIATE R&B

## 2022-01-01 PROCEDURE — 85025 COMPLETE CBC W/AUTO DIFF WBC: CPT

## 2022-01-01 PROCEDURE — 99231 SBSQ HOSP IP/OBS SF/LOW 25: CPT | Performed by: INTERNAL MEDICINE

## 2022-01-01 PROCEDURE — 3700000000 HC ANESTHESIA ATTENDED CARE

## 2022-01-01 PROCEDURE — S5553 INSULIN LONG ACTING 5 U: HCPCS | Performed by: NURSE PRACTITIONER

## 2022-01-01 PROCEDURE — 36415 COLL VENOUS BLD VENIPUNCTURE: CPT

## 2022-01-01 PROCEDURE — 83550 IRON BINDING TEST: CPT

## 2022-01-01 PROCEDURE — 97535 SELF CARE MNGMENT TRAINING: CPT

## 2022-01-01 PROCEDURE — 84100 ASSAY OF PHOSPHORUS: CPT

## 2022-01-01 PROCEDURE — 83935 ASSAY OF URINE OSMOLALITY: CPT

## 2022-01-01 PROCEDURE — 2500000003 HC RX 250 WO HCPCS: Performed by: STUDENT IN AN ORGANIZED HEALTH CARE EDUCATION/TRAINING PROGRAM

## 2022-01-01 PROCEDURE — 2580000003 HC RX 258: Performed by: NURSE ANESTHETIST, CERTIFIED REGISTERED

## 2022-01-01 PROCEDURE — 80048 BASIC METABOLIC PNL TOTAL CA: CPT

## 2022-01-01 PROCEDURE — 85027 COMPLETE CBC AUTOMATED: CPT

## 2022-01-01 PROCEDURE — 87186 SC STD MICRODIL/AGAR DIL: CPT

## 2022-01-01 PROCEDURE — 87635 SARS-COV-2 COVID-19 AMP PRB: CPT

## 2022-01-01 PROCEDURE — 82436 ASSAY OF URINE CHLORIDE: CPT

## 2022-01-01 PROCEDURE — 81001 URINALYSIS AUTO W/SCOPE: CPT

## 2022-01-01 PROCEDURE — 93005 ELECTROCARDIOGRAM TRACING: CPT | Performed by: STUDENT IN AN ORGANIZED HEALTH CARE EDUCATION/TRAINING PROGRAM

## 2022-01-01 PROCEDURE — APPSS60 APP SPLIT SHARED TIME 46-60 MINUTES: Performed by: PHYSICIAN ASSISTANT

## 2022-01-01 PROCEDURE — B24BZZ4 ULTRASONOGRAPHY OF HEART WITH AORTA, TRANSESOPHAGEAL: ICD-10-PCS | Performed by: INTERNAL MEDICINE

## 2022-01-01 PROCEDURE — 84540 ASSAY OF URINE/UREA-N: CPT

## 2022-01-01 PROCEDURE — 84484 ASSAY OF TROPONIN QUANT: CPT

## 2022-01-01 PROCEDURE — 99212 OFFICE O/P EST SF 10 MIN: CPT | Performed by: CLINICAL NURSE SPECIALIST

## 2022-01-01 PROCEDURE — 80053 COMPREHEN METABOLIC PANEL: CPT

## 2022-01-01 PROCEDURE — 6360000004 HC RX CONTRAST MEDICATION: Performed by: RADIOLOGY

## 2022-01-01 PROCEDURE — 2580000003 HC RX 258: Performed by: INTERNAL MEDICINE

## 2022-01-01 PROCEDURE — 83605 ASSAY OF LACTIC ACID: CPT

## 2022-01-01 PROCEDURE — 96375 TX/PRO/DX INJ NEW DRUG ADDON: CPT

## 2022-01-01 PROCEDURE — 82607 VITAMIN B-12: CPT

## 2022-01-01 PROCEDURE — 6360000002 HC RX W HCPCS: Performed by: NURSE ANESTHETIST, CERTIFIED REGISTERED

## 2022-01-01 PROCEDURE — G8427 DOCREV CUR MEDS BY ELIG CLIN: HCPCS | Performed by: CLINICAL NURSE SPECIALIST

## 2022-01-01 PROCEDURE — 87070 CULTURE OTHR SPECIMN AEROBIC: CPT

## 2022-01-01 PROCEDURE — 82746 ASSAY OF FOLIC ACID SERUM: CPT

## 2022-01-01 PROCEDURE — 92960 CARDIOVERSION ELECTRIC EXT: CPT

## 2022-01-01 PROCEDURE — 87205 SMEAR GRAM STAIN: CPT

## 2022-01-01 PROCEDURE — 93321 DOPPLER ECHO F-UP/LMTD STD: CPT

## 2022-01-01 PROCEDURE — 84156 ASSAY OF PROTEIN URINE: CPT

## 2022-01-01 PROCEDURE — 3700000001 HC ADD 15 MINUTES (ANESTHESIA)

## 2022-01-01 PROCEDURE — 92526 ORAL FUNCTION THERAPY: CPT

## 2022-01-01 PROCEDURE — 93000 ELECTROCARDIOGRAM COMPLETE: CPT | Performed by: INTERNAL MEDICINE

## 2022-01-01 PROCEDURE — 6370000000 HC RX 637 (ALT 250 FOR IP): Performed by: STUDENT IN AN ORGANIZED HEALTH CARE EDUCATION/TRAINING PROGRAM

## 2022-01-01 PROCEDURE — 84443 ASSAY THYROID STIM HORMONE: CPT

## 2022-01-01 PROCEDURE — 6370000000 HC RX 637 (ALT 250 FOR IP): Performed by: ANESTHESIOLOGY

## 2022-01-01 PROCEDURE — 92960 CARDIOVERSION ELECTRIC EXT: CPT | Performed by: INTERNAL MEDICINE

## 2022-01-01 PROCEDURE — 76770 US EXAM ABDO BACK WALL COMP: CPT

## 2022-01-01 PROCEDURE — 87449 NOS EACH ORGANISM AG IA: CPT

## 2022-01-01 PROCEDURE — 74230 X-RAY XM SWLNG FUNCJ C+: CPT

## 2022-01-01 PROCEDURE — 2580000003 HC RX 258: Performed by: NURSE PRACTITIONER

## 2022-01-01 PROCEDURE — 94667 MNPJ CHEST WALL 1ST: CPT

## 2022-01-01 PROCEDURE — 93312 ECHO TRANSESOPHAGEAL: CPT

## 2022-01-01 PROCEDURE — 73502 X-RAY EXAM HIP UNI 2-3 VIEWS: CPT

## 2022-01-01 PROCEDURE — 99204 OFFICE O/P NEW MOD 45 MIN: CPT

## 2022-01-01 PROCEDURE — 99285 EMERGENCY DEPT VISIT HI MDM: CPT

## 2022-01-01 PROCEDURE — 71275 CT ANGIOGRAPHY CHEST: CPT

## 2022-01-01 PROCEDURE — 87206 SMEAR FLUORESCENT/ACID STAI: CPT

## 2022-01-01 PROCEDURE — 84145 PROCALCITONIN (PCT): CPT

## 2022-01-01 PROCEDURE — 4040F PNEUMOC VAC/ADMIN/RCVD: CPT | Performed by: CLINICAL NURSE SPECIALIST

## 2022-01-01 PROCEDURE — 84300 ASSAY OF URINE SODIUM: CPT

## 2022-01-01 PROCEDURE — 92611 MOTION FLUOROSCOPY/SWALLOW: CPT

## 2022-01-01 PROCEDURE — 2580000003 HC RX 258: Performed by: STUDENT IN AN ORGANIZED HEALTH CARE EDUCATION/TRAINING PROGRAM

## 2022-01-01 PROCEDURE — 87088 URINE BACTERIA CULTURE: CPT

## 2022-01-01 PROCEDURE — 96365 THER/PROPH/DIAG IV INF INIT: CPT

## 2022-01-01 PROCEDURE — 99221 1ST HOSP IP/OBS SF/LOW 40: CPT | Performed by: FAMILY MEDICINE

## 2022-01-01 PROCEDURE — 99232 SBSQ HOSP IP/OBS MODERATE 35: CPT | Performed by: PHYSICIAN ASSISTANT

## 2022-01-01 PROCEDURE — 93325 DOPPLER ECHO COLOR FLOW MAPG: CPT

## 2022-01-01 PROCEDURE — 80061 LIPID PANEL: CPT

## 2022-01-01 PROCEDURE — 87081 CULTURE SCREEN ONLY: CPT

## 2022-01-01 PROCEDURE — 92610 EVALUATE SWALLOWING FUNCTION: CPT

## 2022-01-01 PROCEDURE — 93312 ECHO TRANSESOPHAGEAL: CPT | Performed by: INTERNAL MEDICINE

## 2022-01-01 PROCEDURE — 87077 CULTURE AEROBIC IDENTIFY: CPT

## 2022-01-01 PROCEDURE — G8420 CALC BMI NORM PARAMETERS: HCPCS | Performed by: CLINICAL NURSE SPECIALIST

## 2022-01-01 PROCEDURE — 70450 CT HEAD/BRAIN W/O DYE: CPT

## 2022-01-01 PROCEDURE — 97166 OT EVAL MOD COMPLEX 45 MIN: CPT

## 2022-01-01 PROCEDURE — 1111F DSCHRG MED/CURRENT MED MERGE: CPT | Performed by: CLINICAL NURSE SPECIALIST

## 2022-01-01 PROCEDURE — 93005 ELECTROCARDIOGRAM TRACING: CPT | Performed by: PHYSICIAN ASSISTANT

## 2022-01-01 PROCEDURE — 97161 PT EVAL LOW COMPLEX 20 MIN: CPT

## 2022-01-01 PROCEDURE — 82306 VITAMIN D 25 HYDROXY: CPT

## 2022-01-01 PROCEDURE — 6360000002 HC RX W HCPCS: Performed by: STUDENT IN AN ORGANIZED HEALTH CARE EDUCATION/TRAINING PROGRAM

## 2022-01-01 PROCEDURE — 1123F ACP DISCUSS/DSCN MKR DOCD: CPT | Performed by: CLINICAL NURSE SPECIALIST

## 2022-01-01 PROCEDURE — 93005 ELECTROCARDIOGRAM TRACING: CPT | Performed by: INTERNAL MEDICINE

## 2022-01-01 PROCEDURE — 99214 OFFICE O/P EST MOD 30 MIN: CPT

## 2022-01-01 PROCEDURE — 81003 URINALYSIS AUTO W/O SCOPE: CPT

## 2022-01-01 PROCEDURE — 82570 ASSAY OF URINE CREATININE: CPT

## 2022-01-01 PROCEDURE — 1036F TOBACCO NON-USER: CPT | Performed by: CLINICAL NURSE SPECIALIST

## 2022-01-01 PROCEDURE — 2580000003 HC RX 258: Performed by: EMERGENCY MEDICINE

## 2022-01-01 RX ORDER — INSULIN GLARGINE 100 [IU]/ML
8 INJECTION, SOLUTION SUBCUTANEOUS NIGHTLY
COMMUNITY

## 2022-01-01 RX ORDER — TORSEMIDE 20 MG/1
40 TABLET ORAL DAILY
Status: DISCONTINUED | OUTPATIENT
Start: 2022-01-01 | End: 2022-01-01

## 2022-01-01 RX ORDER — AMIODARONE HYDROCHLORIDE 400 MG/1
400 TABLET ORAL 2 TIMES DAILY
Qty: 9 TABLET | Refills: 0
Start: 2022-01-01 | End: 2022-01-01

## 2022-01-01 RX ORDER — ACETAMINOPHEN 325 MG/1
650 TABLET ORAL EVERY 4 HOURS PRN
COMMUNITY

## 2022-01-01 RX ORDER — SENNA PLUS 8.6 MG/1
1 TABLET ORAL NIGHTLY
Status: DISCONTINUED | OUTPATIENT
Start: 2022-01-01 | End: 2022-01-01

## 2022-01-01 RX ORDER — SODIUM CHLORIDE 9 MG/ML
INJECTION, SOLUTION INTRAVENOUS CONTINUOUS PRN
Status: DISCONTINUED | OUTPATIENT
Start: 2022-01-01 | End: 2022-01-01 | Stop reason: SDUPTHER

## 2022-01-01 RX ORDER — LOPERAMIDE HYDROCHLORIDE 2 MG/1
2 CAPSULE ORAL 2 TIMES DAILY PRN
Status: ON HOLD | COMMUNITY
End: 2022-01-01 | Stop reason: HOSPADM

## 2022-01-01 RX ORDER — METOPROLOL SUCCINATE 25 MG/1
25 TABLET, EXTENDED RELEASE ORAL 2 TIMES DAILY
Status: DISCONTINUED | OUTPATIENT
Start: 2022-01-01 | End: 2022-01-01

## 2022-01-01 RX ORDER — ASPIRIN 81 MG/1
81 TABLET, CHEWABLE ORAL DAILY
Status: DISCONTINUED | OUTPATIENT
Start: 2022-01-01 | End: 2022-01-01

## 2022-01-01 RX ORDER — PROPOFOL 10 MG/ML
INJECTION, EMULSION INTRAVENOUS PRN
Status: DISCONTINUED | OUTPATIENT
Start: 2022-01-01 | End: 2022-01-01 | Stop reason: SDUPTHER

## 2022-01-01 RX ORDER — SODIUM CHLORIDE 0.9 % (FLUSH) 0.9 %
5-40 SYRINGE (ML) INJECTION PRN
Status: DISCONTINUED | OUTPATIENT
Start: 2022-01-01 | End: 2022-01-01 | Stop reason: HOSPADM

## 2022-01-01 RX ORDER — FUROSEMIDE 10 MG/ML
20 INJECTION INTRAMUSCULAR; INTRAVENOUS 2 TIMES DAILY
Status: DISCONTINUED | OUTPATIENT
Start: 2022-01-01 | End: 2022-01-01

## 2022-01-01 RX ORDER — LIDOCAINE HYDROCHLORIDE 10 MG/ML
5 INJECTION, SOLUTION INFILTRATION; PERINEURAL ONCE
Status: DISCONTINUED | OUTPATIENT
Start: 2022-01-01 | End: 2022-01-01 | Stop reason: HOSPADM

## 2022-01-01 RX ORDER — ATORVASTATIN CALCIUM 40 MG/1
40 TABLET, FILM COATED ORAL DAILY
Status: DISCONTINUED | OUTPATIENT
Start: 2022-01-01 | End: 2022-01-01 | Stop reason: HOSPADM

## 2022-01-01 RX ORDER — FUROSEMIDE 10 MG/ML
20 INJECTION INTRAMUSCULAR; INTRAVENOUS DAILY
Status: DISCONTINUED | OUTPATIENT
Start: 2022-01-01 | End: 2022-01-01

## 2022-01-01 RX ORDER — ACETAMINOPHEN 650 MG/1
650 SUPPOSITORY RECTAL EVERY 6 HOURS PRN
Status: DISCONTINUED | OUTPATIENT
Start: 2022-01-01 | End: 2022-01-01 | Stop reason: HOSPADM

## 2022-01-01 RX ORDER — IPRATROPIUM BROMIDE AND ALBUTEROL SULFATE 2.5; .5 MG/3ML; MG/3ML
3 SOLUTION RESPIRATORY (INHALATION) 3 TIMES DAILY
Qty: 360 ML | Refills: 0
Start: 2022-01-01

## 2022-01-01 RX ORDER — LACTOBACILLUS RHAMNOSUS GG 10B CELL
2 CAPSULE ORAL 3 TIMES DAILY
Status: DISCONTINUED | OUTPATIENT
Start: 2022-01-01 | End: 2022-01-01 | Stop reason: HOSPADM

## 2022-01-01 RX ORDER — POLYETHYLENE GLYCOL 3350 17 G/17G
17 POWDER, FOR SOLUTION ORAL DAILY PRN
Status: DISCONTINUED | OUTPATIENT
Start: 2022-01-01 | End: 2022-01-01 | Stop reason: HOSPADM

## 2022-01-01 RX ORDER — POLYETHYLENE GLYCOL 3350 17 G/17G
17 POWDER, FOR SOLUTION ORAL DAILY PRN
Qty: 527 G | Refills: 1
Start: 2022-01-01 | End: 2022-01-01

## 2022-01-01 RX ORDER — FUROSEMIDE 10 MG/ML
20 INJECTION INTRAMUSCULAR; INTRAVENOUS ONCE
Status: COMPLETED | OUTPATIENT
Start: 2022-01-01 | End: 2022-01-01

## 2022-01-01 RX ORDER — DEXTROSE MONOHYDRATE 50 MG/ML
100 INJECTION, SOLUTION INTRAVENOUS PRN
Status: DISCONTINUED | OUTPATIENT
Start: 2022-01-01 | End: 2022-01-01 | Stop reason: HOSPADM

## 2022-01-01 RX ORDER — HEPARIN SODIUM (PORCINE) LOCK FLUSH IV SOLN 100 UNIT/ML 100 UNIT/ML
1 SOLUTION INTRAVENOUS EVERY 12 HOURS SCHEDULED
Status: DISCONTINUED | OUTPATIENT
Start: 2022-01-01 | End: 2022-01-01 | Stop reason: HOSPADM

## 2022-01-01 RX ORDER — SODIUM CHLORIDE 9 MG/ML
INJECTION, SOLUTION INTRAVENOUS PRN
Status: DISCONTINUED | OUTPATIENT
Start: 2022-01-01 | End: 2022-01-01 | Stop reason: SDUPTHER

## 2022-01-01 RX ORDER — ACETAMINOPHEN 500 MG
1000 TABLET ORAL EVERY 6 HOURS PRN
Status: DISCONTINUED | OUTPATIENT
Start: 2022-01-01 | End: 2022-01-01 | Stop reason: HOSPADM

## 2022-01-01 RX ORDER — SERTRALINE HYDROCHLORIDE 25 MG/1
25 TABLET, FILM COATED ORAL DAILY
Qty: 30 TABLET | Refills: 3
Start: 2022-01-01

## 2022-01-01 RX ORDER — FUROSEMIDE 20 MG/1
20 TABLET ORAL 2 TIMES DAILY
Status: ON HOLD | COMMUNITY
End: 2022-01-01 | Stop reason: HOSPADM

## 2022-01-01 RX ORDER — ONDANSETRON 2 MG/ML
4 INJECTION INTRAMUSCULAR; INTRAVENOUS EVERY 6 HOURS PRN
Status: DISCONTINUED | OUTPATIENT
Start: 2022-01-01 | End: 2022-01-01

## 2022-01-01 RX ORDER — DIGOXIN 0.25 MG/ML
250 INJECTION INTRAMUSCULAR; INTRAVENOUS ONCE
Status: COMPLETED | OUTPATIENT
Start: 2022-01-01 | End: 2022-01-01

## 2022-01-01 RX ORDER — INSULIN GLARGINE-YFGN 100 [IU]/ML
10 INJECTION, SOLUTION SUBCUTANEOUS DAILY
Status: DISCONTINUED | OUTPATIENT
Start: 2022-01-01 | End: 2022-01-01 | Stop reason: HOSPADM

## 2022-01-01 RX ORDER — IPRATROPIUM BROMIDE AND ALBUTEROL SULFATE 2.5; .5 MG/3ML; MG/3ML
1 SOLUTION RESPIRATORY (INHALATION) ONCE
Status: COMPLETED | OUTPATIENT
Start: 2022-01-01 | End: 2022-01-01

## 2022-01-01 RX ORDER — CEFEPIME HYDROCHLORIDE 2 G/1
2 INJECTION, POWDER, FOR SOLUTION INTRAVENOUS EVERY 8 HOURS
Qty: 24 EACH | Refills: 0 | Status: SHIPPED | OUTPATIENT
Start: 2022-01-01 | End: 2022-01-01

## 2022-01-01 RX ORDER — LANOLIN ALCOHOL/MO/W.PET/CERES
500 CREAM (GRAM) TOPICAL NIGHTLY
Status: DISCONTINUED | OUTPATIENT
Start: 2022-01-01 | End: 2022-01-01 | Stop reason: HOSPADM

## 2022-01-01 RX ORDER — AMIODARONE HYDROCHLORIDE 200 MG/1
200 TABLET ORAL DAILY
Status: DISCONTINUED | OUTPATIENT
Start: 2022-01-01 | End: 2022-01-01 | Stop reason: HOSPADM

## 2022-01-01 RX ORDER — ONDANSETRON 4 MG/1
4 TABLET, ORALLY DISINTEGRATING ORAL EVERY 8 HOURS PRN
Status: DISCONTINUED | OUTPATIENT
Start: 2022-01-01 | End: 2022-01-01

## 2022-01-01 RX ORDER — FINASTERIDE 5 MG/1
5 TABLET, FILM COATED ORAL DAILY
Status: DISCONTINUED | OUTPATIENT
Start: 2022-01-01 | End: 2022-01-01 | Stop reason: HOSPADM

## 2022-01-01 RX ORDER — 0.9 % SODIUM CHLORIDE 0.9 %
500 INTRAVENOUS SOLUTION INTRAVENOUS ONCE
Status: COMPLETED | OUTPATIENT
Start: 2022-01-01 | End: 2022-01-01

## 2022-01-01 RX ORDER — LACTOBACILLUS RHAMNOSUS GG 10B CELL
2 CAPSULE ORAL 3 TIMES DAILY
Qty: 180 CAPSULE | Refills: 0
Start: 2022-01-01 | End: 2022-01-01

## 2022-01-01 RX ORDER — SODIUM CHLORIDE 0.9 % (FLUSH) 0.9 %
5-40 SYRINGE (ML) INJECTION PRN
Status: DISCONTINUED | OUTPATIENT
Start: 2022-01-01 | End: 2022-01-01 | Stop reason: SDUPTHER

## 2022-01-01 RX ORDER — FUROSEMIDE 10 MG/ML
40 INJECTION INTRAMUSCULAR; INTRAVENOUS 2 TIMES DAILY
Status: COMPLETED | OUTPATIENT
Start: 2022-01-01 | End: 2022-01-01

## 2022-01-01 RX ORDER — AMIODARONE HYDROCHLORIDE 200 MG/1
200 TABLET ORAL DAILY
Qty: 30 TABLET | Refills: 0
Start: 2022-01-01 | End: 2022-05-24

## 2022-01-01 RX ORDER — INSULIN GLARGINE-YFGN 100 [IU]/ML
8 INJECTION, SOLUTION SUBCUTANEOUS DAILY
Qty: 1 EACH | Refills: 0
Start: 2022-01-01 | End: 2022-01-01

## 2022-01-01 RX ORDER — METOPROLOL SUCCINATE 50 MG/1
50 TABLET, EXTENDED RELEASE ORAL 2 TIMES DAILY
Status: DISCONTINUED | OUTPATIENT
Start: 2022-01-01 | End: 2022-01-01 | Stop reason: HOSPADM

## 2022-01-01 RX ORDER — GUAIFENESIN 200 MG/1
400 TABLET ORAL EVERY 4 HOURS PRN
Status: ON HOLD | COMMUNITY
End: 2022-01-01 | Stop reason: HOSPADM

## 2022-01-01 RX ORDER — METOPROLOL SUCCINATE 50 MG/1
50 TABLET, EXTENDED RELEASE ORAL 2 TIMES DAILY
Qty: 30 TABLET | Refills: 0
Start: 2022-01-01

## 2022-01-01 RX ORDER — DIGOXIN 0.25 MG/ML
250 INJECTION INTRAMUSCULAR; INTRAVENOUS ONCE
Status: DISCONTINUED | OUTPATIENT
Start: 2022-01-01 | End: 2022-01-01

## 2022-01-01 RX ORDER — AMIODARONE HYDROCHLORIDE 200 MG/1
400 TABLET ORAL 2 TIMES DAILY
Status: DISCONTINUED | OUTPATIENT
Start: 2022-01-01 | End: 2022-01-01 | Stop reason: HOSPADM

## 2022-01-01 RX ORDER — HEPARIN SODIUM (PORCINE) LOCK FLUSH IV SOLN 100 UNIT/ML 100 UNIT/ML
1 SOLUTION INTRAVENOUS PRN
Status: DISCONTINUED | OUTPATIENT
Start: 2022-01-01 | End: 2022-01-01 | Stop reason: HOSPADM

## 2022-01-01 RX ORDER — NICOTINE POLACRILEX 4 MG
15 LOZENGE BUCCAL PRN
Status: DISCONTINUED | OUTPATIENT
Start: 2022-01-01 | End: 2022-01-01 | Stop reason: HOSPADM

## 2022-01-01 RX ORDER — ACETAMINOPHEN 325 MG/1
650 TABLET ORAL EVERY 6 HOURS PRN
Status: DISCONTINUED | OUTPATIENT
Start: 2022-01-01 | End: 2022-01-01

## 2022-01-01 RX ORDER — ACETAMINOPHEN 650 MG/1
650 SUPPOSITORY RECTAL EVERY 6 HOURS PRN
Status: DISCONTINUED | OUTPATIENT
Start: 2022-01-01 | End: 2022-01-01

## 2022-01-01 RX ORDER — SODIUM CHLORIDE 9 MG/ML
INJECTION, SOLUTION INTRAVENOUS PRN
Status: DISCONTINUED | OUTPATIENT
Start: 2022-01-01 | End: 2022-01-01 | Stop reason: HOSPADM

## 2022-01-01 RX ORDER — SODIUM CHLORIDE 0.9 % (FLUSH) 0.9 %
5-40 SYRINGE (ML) INJECTION EVERY 12 HOURS SCHEDULED
Status: DISCONTINUED | OUTPATIENT
Start: 2022-01-01 | End: 2022-01-01 | Stop reason: SDUPTHER

## 2022-01-01 RX ORDER — DILTIAZEM HYDROCHLORIDE 5 MG/ML
10 INJECTION INTRAVENOUS ONCE
Status: COMPLETED | OUTPATIENT
Start: 2022-01-01 | End: 2022-01-01

## 2022-01-01 RX ORDER — TAMSULOSIN HYDROCHLORIDE 0.4 MG/1
0.4 CAPSULE ORAL DAILY
Status: DISCONTINUED | OUTPATIENT
Start: 2022-01-01 | End: 2022-01-01 | Stop reason: HOSPADM

## 2022-01-01 RX ORDER — SODIUM CHLORIDE 0.9 % (FLUSH) 0.9 %
5-40 SYRINGE (ML) INJECTION EVERY 12 HOURS SCHEDULED
Status: DISCONTINUED | OUTPATIENT
Start: 2022-01-01 | End: 2022-01-01 | Stop reason: HOSPADM

## 2022-01-01 RX ORDER — IPRATROPIUM BROMIDE AND ALBUTEROL SULFATE 2.5; .5 MG/3ML; MG/3ML
1 SOLUTION RESPIRATORY (INHALATION) 3 TIMES DAILY
Status: DISCONTINUED | OUTPATIENT
Start: 2022-01-01 | End: 2022-01-01 | Stop reason: HOSPADM

## 2022-01-01 RX ORDER — DOXYCYCLINE HYCLATE 100 MG/1
100 CAPSULE ORAL ONCE
Status: COMPLETED | OUTPATIENT
Start: 2022-01-01 | End: 2022-01-01

## 2022-01-01 RX ORDER — MAGNESIUM SULFATE IN WATER 40 MG/ML
2000 INJECTION, SOLUTION INTRAVENOUS ONCE
Status: COMPLETED | OUTPATIENT
Start: 2022-01-01 | End: 2022-01-01

## 2022-01-01 RX ORDER — FUROSEMIDE 40 MG/1
40 TABLET ORAL 2 TIMES DAILY
Status: DISCONTINUED | OUTPATIENT
Start: 2022-01-01 | End: 2022-01-01

## 2022-01-01 RX ORDER — POTASSIUM CHLORIDE 20 MEQ/1
40 TABLET, EXTENDED RELEASE ORAL ONCE
Status: COMPLETED | OUTPATIENT
Start: 2022-01-01 | End: 2022-01-01

## 2022-01-01 RX ORDER — DEXTROSE MONOHYDRATE 25 G/50ML
12.5 INJECTION, SOLUTION INTRAVENOUS PRN
Status: DISCONTINUED | OUTPATIENT
Start: 2022-01-01 | End: 2022-01-01 | Stop reason: HOSPADM

## 2022-01-01 RX ADMIN — TAMSULOSIN HYDROCHLORIDE 0.4 MG: 0.4 CAPSULE ORAL at 08:14

## 2022-01-01 RX ADMIN — ENOXAPARIN SODIUM 40 MG: 100 INJECTION SUBCUTANEOUS at 09:02

## 2022-01-01 RX ADMIN — PIPERACILLIN AND TAZOBACTAM 3375 MG: 3; .375 INJECTION, POWDER, LYOPHILIZED, FOR SOLUTION INTRAVENOUS at 03:16

## 2022-01-01 RX ADMIN — CEFEPIME HYDROCHLORIDE 2000 MG: 2 INJECTION, POWDER, FOR SOLUTION INTRAVENOUS at 04:26

## 2022-01-01 RX ADMIN — IPRATROPIUM BROMIDE AND ALBUTEROL SULFATE 1 AMPULE: .5; 2.5 SOLUTION RESPIRATORY (INHALATION) at 09:08

## 2022-01-01 RX ADMIN — CEFEPIME HYDROCHLORIDE 2000 MG: 2 INJECTION, POWDER, FOR SOLUTION INTRAVENOUS at 18:14

## 2022-01-01 RX ADMIN — Medication 10 ML: at 09:08

## 2022-01-01 RX ADMIN — Medication 2 CAPSULE: at 08:59

## 2022-01-01 RX ADMIN — PIPERACILLIN AND TAZOBACTAM 3375 MG: 3; .375 INJECTION, POWDER, LYOPHILIZED, FOR SOLUTION INTRAVENOUS at 12:38

## 2022-01-01 RX ADMIN — APIXABAN 5 MG: 5 TABLET, FILM COATED ORAL at 09:50

## 2022-01-01 RX ADMIN — ACETAMINOPHEN 1000 MG: 500 TABLET ORAL at 05:45

## 2022-01-01 RX ADMIN — INSULIN LISPRO 2 UNITS: 100 INJECTION, SOLUTION INTRAVENOUS; SUBCUTANEOUS at 11:42

## 2022-01-01 RX ADMIN — METOPROLOL TARTRATE 25 MG: 25 TABLET, FILM COATED ORAL at 20:12

## 2022-01-01 RX ADMIN — DOXYCYCLINE 100 MG: 100 INJECTION, POWDER, LYOPHILIZED, FOR SOLUTION INTRAVENOUS at 20:30

## 2022-01-01 RX ADMIN — Medication 10 ML: at 20:10

## 2022-01-01 RX ADMIN — AMIODARONE HYDROCHLORIDE 400 MG: 200 TABLET ORAL at 08:53

## 2022-01-01 RX ADMIN — FINASTERIDE 5 MG: 5 TABLET, FILM COATED ORAL at 09:03

## 2022-01-01 RX ADMIN — IPRATROPIUM BROMIDE AND ALBUTEROL SULFATE 1 AMPULE: .5; 2.5 SOLUTION RESPIRATORY (INHALATION) at 13:39

## 2022-01-01 RX ADMIN — SODIUM CHLORIDE: 9 INJECTION, SOLUTION INTRAVENOUS at 11:52

## 2022-01-01 RX ADMIN — Medication 2 CAPSULE: at 21:14

## 2022-01-01 RX ADMIN — INSULIN LISPRO 1 UNITS: 100 INJECTION, SOLUTION INTRAVENOUS; SUBCUTANEOUS at 11:40

## 2022-01-01 RX ADMIN — ATORVASTATIN CALCIUM 40 MG: 40 TABLET, FILM COATED ORAL at 10:25

## 2022-01-01 RX ADMIN — CEFEPIME HYDROCHLORIDE 2000 MG: 2 INJECTION, POWDER, FOR SOLUTION INTRAVENOUS at 13:40

## 2022-01-01 RX ADMIN — INSULIN LISPRO 2 UNITS: 100 INJECTION, SOLUTION INTRAVENOUS; SUBCUTANEOUS at 20:01

## 2022-01-01 RX ADMIN — INSULIN LISPRO 1 UNITS: 100 INJECTION, SOLUTION INTRAVENOUS; SUBCUTANEOUS at 06:15

## 2022-01-01 RX ADMIN — AMIODARONE HYDROCHLORIDE 1 MG/MIN: 50 INJECTION, SOLUTION INTRAVENOUS at 02:06

## 2022-01-01 RX ADMIN — IPRATROPIUM BROMIDE AND ALBUTEROL SULFATE 1 AMPULE: .5; 2.5 SOLUTION RESPIRATORY (INHALATION) at 12:31

## 2022-01-01 RX ADMIN — SALINE NASAL SPRAY 1 SPRAY: 1.5 SOLUTION NASAL at 17:06

## 2022-01-01 RX ADMIN — IPRATROPIUM BROMIDE AND ALBUTEROL SULFATE 1 AMPULE: .5; 2.5 SOLUTION RESPIRATORY (INHALATION) at 08:49

## 2022-01-01 RX ADMIN — APIXABAN 5 MG: 5 TABLET, FILM COATED ORAL at 22:53

## 2022-01-01 RX ADMIN — Medication 500 MG: at 20:01

## 2022-01-01 RX ADMIN — ATORVASTATIN CALCIUM 40 MG: 40 TABLET, FILM COATED ORAL at 09:03

## 2022-01-01 RX ADMIN — APIXABAN 5 MG: 5 TABLET, FILM COATED ORAL at 08:59

## 2022-01-01 RX ADMIN — PIPERACILLIN AND TAZOBACTAM 3375 MG: 3; .375 INJECTION, POWDER, LYOPHILIZED, FOR SOLUTION INTRAVENOUS at 20:41

## 2022-01-01 RX ADMIN — Medication 2 CAPSULE: at 15:41

## 2022-01-01 RX ADMIN — Medication 10 ML: at 10:18

## 2022-01-01 RX ADMIN — POTASSIUM CHLORIDE 40 MEQ: 20 TABLET, EXTENDED RELEASE ORAL at 01:43

## 2022-01-01 RX ADMIN — DIGOXIN 250 MCG: 0.25 INJECTION INTRAMUSCULAR; INTRAVENOUS at 22:54

## 2022-01-01 RX ADMIN — Medication 10 ML: at 08:14

## 2022-01-01 RX ADMIN — INSULIN LISPRO 1 UNITS: 100 INJECTION, SOLUTION INTRAVENOUS; SUBCUTANEOUS at 17:05

## 2022-01-01 RX ADMIN — SODIUM CHLORIDE: 9 INJECTION, SOLUTION INTRAVENOUS at 10:52

## 2022-01-01 RX ADMIN — ATORVASTATIN CALCIUM 40 MG: 40 TABLET, FILM COATED ORAL at 09:50

## 2022-01-01 RX ADMIN — ASPIRIN 81 MG 81 MG: 81 TABLET ORAL at 13:11

## 2022-01-01 RX ADMIN — APIXABAN 5 MG: 5 TABLET, FILM COATED ORAL at 23:08

## 2022-01-01 RX ADMIN — Medication 10 ML: at 10:56

## 2022-01-01 RX ADMIN — Medication 2 CAPSULE: at 19:54

## 2022-01-01 RX ADMIN — IPRATROPIUM BROMIDE AND ALBUTEROL SULFATE 1 AMPULE: .5; 2.5 SOLUTION RESPIRATORY (INHALATION) at 08:01

## 2022-01-01 RX ADMIN — AMIODARONE HYDROCHLORIDE 1 MG/MIN: 50 INJECTION, SOLUTION INTRAVENOUS at 17:51

## 2022-01-01 RX ADMIN — ENOXAPARIN SODIUM 80 MG: 100 INJECTION SUBCUTANEOUS at 23:37

## 2022-01-01 RX ADMIN — Medication 500 MG: at 21:15

## 2022-01-01 RX ADMIN — INSULIN LISPRO 1 UNITS: 100 INJECTION, SOLUTION INTRAVENOUS; SUBCUTANEOUS at 20:05

## 2022-01-01 RX ADMIN — IPRATROPIUM BROMIDE AND ALBUTEROL SULFATE 1 AMPULE: .5; 2.5 SOLUTION RESPIRATORY (INHALATION) at 08:22

## 2022-01-01 RX ADMIN — DOXYCYCLINE 100 MG: 100 INJECTION, POWDER, LYOPHILIZED, FOR SOLUTION INTRAVENOUS at 20:24

## 2022-01-01 RX ADMIN — APIXABAN 5 MG: 5 TABLET, FILM COATED ORAL at 21:14

## 2022-01-01 RX ADMIN — APIXABAN 5 MG: 5 TABLET, FILM COATED ORAL at 10:25

## 2022-01-01 RX ADMIN — METOPROLOL TARTRATE 25 MG: 25 TABLET, FILM COATED ORAL at 01:44

## 2022-01-01 RX ADMIN — DOXYCYCLINE 100 MG: 100 INJECTION, POWDER, LYOPHILIZED, FOR SOLUTION INTRAVENOUS at 14:52

## 2022-01-01 RX ADMIN — ATORVASTATIN CALCIUM 40 MG: 40 TABLET, FILM COATED ORAL at 08:14

## 2022-01-01 RX ADMIN — IPRATROPIUM BROMIDE AND ALBUTEROL SULFATE 1 AMPULE: .5; 2.5 SOLUTION RESPIRATORY (INHALATION) at 18:52

## 2022-01-01 RX ADMIN — TAMSULOSIN HYDROCHLORIDE 0.4 MG: 0.4 CAPSULE ORAL at 14:23

## 2022-01-01 RX ADMIN — AMIODARONE HYDROCHLORIDE 400 MG: 200 TABLET ORAL at 08:59

## 2022-01-01 RX ADMIN — INSULIN LISPRO 1 UNITS: 100 INJECTION, SOLUTION INTRAVENOUS; SUBCUTANEOUS at 06:24

## 2022-01-01 RX ADMIN — AMIODARONE HYDROCHLORIDE 400 MG: 200 TABLET ORAL at 10:12

## 2022-01-01 RX ADMIN — PROPOFOL 50 MG: 10 INJECTION, EMULSION INTRAVENOUS at 12:01

## 2022-01-01 RX ADMIN — INSULIN LISPRO 1 UNITS: 100 INJECTION, SOLUTION INTRAVENOUS; SUBCUTANEOUS at 06:37

## 2022-01-01 RX ADMIN — CEFEPIME HYDROCHLORIDE 2000 MG: 2 INJECTION, POWDER, FOR SOLUTION INTRAVENOUS at 18:20

## 2022-01-01 RX ADMIN — Medication 10 ML: at 20:54

## 2022-01-01 RX ADMIN — AMIODARONE HYDROCHLORIDE 400 MG: 200 TABLET ORAL at 10:25

## 2022-01-01 RX ADMIN — Medication 500 MG: at 20:22

## 2022-01-01 RX ADMIN — FUROSEMIDE 40 MG: 10 INJECTION, SOLUTION INTRAMUSCULAR; INTRAVENOUS at 13:11

## 2022-01-01 RX ADMIN — IPRATROPIUM BROMIDE AND ALBUTEROL SULFATE 1 AMPULE: .5; 2.5 SOLUTION RESPIRATORY (INHALATION) at 15:47

## 2022-01-01 RX ADMIN — IPRATROPIUM BROMIDE AND ALBUTEROL SULFATE 1 AMPULE: .5; 2.5 SOLUTION RESPIRATORY (INHALATION) at 13:07

## 2022-01-01 RX ADMIN — FUROSEMIDE 40 MG: 10 INJECTION, SOLUTION INTRAMUSCULAR; INTRAVENOUS at 20:51

## 2022-01-01 RX ADMIN — DOXYCYCLINE 100 MG: 100 INJECTION, POWDER, LYOPHILIZED, FOR SOLUTION INTRAVENOUS at 03:30

## 2022-01-01 RX ADMIN — IPRATROPIUM BROMIDE AND ALBUTEROL SULFATE 1 AMPULE: .5; 2.5 SOLUTION RESPIRATORY (INHALATION) at 12:02

## 2022-01-01 RX ADMIN — INSULIN LISPRO 2 UNITS: 100 INJECTION, SOLUTION INTRAVENOUS; SUBCUTANEOUS at 11:29

## 2022-01-01 RX ADMIN — METOPROLOL TARTRATE 25 MG: 25 TABLET, FILM COATED ORAL at 09:03

## 2022-01-01 RX ADMIN — IPRATROPIUM BROMIDE AND ALBUTEROL SULFATE 1 AMPULE: .5; 2.5 SOLUTION RESPIRATORY (INHALATION) at 18:43

## 2022-01-01 RX ADMIN — Medication 2 CAPSULE: at 10:25

## 2022-01-01 RX ADMIN — PIPERACILLIN AND TAZOBACTAM 3375 MG: 3; .375 INJECTION, POWDER, LYOPHILIZED, FOR SOLUTION INTRAVENOUS at 21:29

## 2022-01-01 RX ADMIN — METOPROLOL SUCCINATE 50 MG: 50 TABLET, EXTENDED RELEASE ORAL at 20:23

## 2022-01-01 RX ADMIN — TAMSULOSIN HYDROCHLORIDE 0.4 MG: 0.4 CAPSULE ORAL at 10:54

## 2022-01-01 RX ADMIN — INSULIN LISPRO 1 UNITS: 100 INJECTION, SOLUTION INTRAVENOUS; SUBCUTANEOUS at 17:16

## 2022-01-01 RX ADMIN — Medication 2 CAPSULE: at 10:11

## 2022-01-01 RX ADMIN — PROPOFOL 20 MG: 10 INJECTION, EMULSION INTRAVENOUS at 12:04

## 2022-01-01 RX ADMIN — INSULIN LISPRO 1 UNITS: 100 INJECTION, SOLUTION INTRAVENOUS; SUBCUTANEOUS at 17:42

## 2022-01-01 RX ADMIN — METOPROLOL SUCCINATE 50 MG: 50 TABLET, EXTENDED RELEASE ORAL at 20:25

## 2022-01-01 RX ADMIN — Medication 2 CAPSULE: at 20:28

## 2022-01-01 RX ADMIN — SODIUM CHLORIDE 500 ML: 9 INJECTION, SOLUTION INTRAVENOUS at 15:18

## 2022-01-01 RX ADMIN — TORSEMIDE 40 MG: 20 TABLET ORAL at 10:24

## 2022-01-01 RX ADMIN — INSULIN LISPRO 1 UNITS: 100 INJECTION, SOLUTION INTRAVENOUS; SUBCUTANEOUS at 20:27

## 2022-01-01 RX ADMIN — Medication 2 CAPSULE: at 23:08

## 2022-01-01 RX ADMIN — INSULIN LISPRO 1 UNITS: 100 INJECTION, SOLUTION INTRAVENOUS; SUBCUTANEOUS at 23:09

## 2022-01-01 RX ADMIN — SALINE NASAL SPRAY 1 SPRAY: 1.5 SOLUTION NASAL at 17:43

## 2022-01-01 RX ADMIN — CEFEPIME HYDROCHLORIDE 2000 MG: 2 INJECTION, POWDER, FOR SOLUTION INTRAVENOUS at 15:50

## 2022-01-01 RX ADMIN — FUROSEMIDE 40 MG: 10 INJECTION, SOLUTION INTRAMUSCULAR; INTRAVENOUS at 14:21

## 2022-01-01 RX ADMIN — IPRATROPIUM BROMIDE AND ALBUTEROL SULFATE 1 AMPULE: .5; 2.5 SOLUTION RESPIRATORY (INHALATION) at 12:47

## 2022-01-01 RX ADMIN — ASPIRIN 81 MG 81 MG: 81 TABLET ORAL at 09:03

## 2022-01-01 RX ADMIN — Medication 5 ML: at 23:44

## 2022-01-01 RX ADMIN — FUROSEMIDE 40 MG: 10 INJECTION, SOLUTION INTRAMUSCULAR; INTRAVENOUS at 20:26

## 2022-01-01 RX ADMIN — Medication 10 ML: at 09:03

## 2022-01-01 RX ADMIN — TAMSULOSIN HYDROCHLORIDE 0.4 MG: 0.4 CAPSULE ORAL at 10:25

## 2022-01-01 RX ADMIN — ATORVASTATIN CALCIUM 40 MG: 40 TABLET, FILM COATED ORAL at 10:12

## 2022-01-01 RX ADMIN — TAMSULOSIN HYDROCHLORIDE 0.4 MG: 0.4 CAPSULE ORAL at 10:12

## 2022-01-01 RX ADMIN — IPRATROPIUM BROMIDE AND ALBUTEROL SULFATE 1 AMPULE: .5; 2.5 SOLUTION RESPIRATORY (INHALATION) at 12:44

## 2022-01-01 RX ADMIN — Medication 500 MG: at 20:26

## 2022-01-01 RX ADMIN — ASPIRIN 81 MG 81 MG: 81 TABLET ORAL at 08:14

## 2022-01-01 RX ADMIN — TAMSULOSIN HYDROCHLORIDE 0.4 MG: 0.4 CAPSULE ORAL at 08:53

## 2022-01-01 RX ADMIN — CEFEPIME HYDROCHLORIDE 2000 MG: 2 INJECTION, POWDER, FOR SOLUTION INTRAVENOUS at 05:31

## 2022-01-01 RX ADMIN — PIPERACILLIN AND TAZOBACTAM 3375 MG: 3; .375 INJECTION, POWDER, LYOPHILIZED, FOR SOLUTION INTRAVENOUS at 02:58

## 2022-01-01 RX ADMIN — APIXABAN 5 MG: 5 TABLET, FILM COATED ORAL at 08:24

## 2022-01-01 RX ADMIN — IPRATROPIUM BROMIDE AND ALBUTEROL SULFATE 1 AMPULE: .5; 2.5 SOLUTION RESPIRATORY (INHALATION) at 08:42

## 2022-01-01 RX ADMIN — INSULIN LISPRO 2 UNITS: 100 INJECTION, SOLUTION INTRAVENOUS; SUBCUTANEOUS at 11:24

## 2022-01-01 RX ADMIN — INSULIN LISPRO 2 UNITS: 100 INJECTION, SOLUTION INTRAVENOUS; SUBCUTANEOUS at 18:14

## 2022-01-01 RX ADMIN — SODIUM CHLORIDE 500 ML: 9 INJECTION, SOLUTION INTRAVENOUS at 17:50

## 2022-01-01 RX ADMIN — INSULIN LISPRO 1 UNITS: 100 INJECTION, SOLUTION INTRAVENOUS; SUBCUTANEOUS at 21:29

## 2022-01-01 RX ADMIN — METOPROLOL SUCCINATE 50 MG: 50 TABLET, EXTENDED RELEASE ORAL at 08:14

## 2022-01-01 RX ADMIN — DOXYCYCLINE 100 MG: 100 INJECTION, POWDER, LYOPHILIZED, FOR SOLUTION INTRAVENOUS at 14:40

## 2022-01-01 RX ADMIN — IPRATROPIUM BROMIDE AND ALBUTEROL SULFATE 1 AMPULE: .5; 2.5 SOLUTION RESPIRATORY (INHALATION) at 21:32

## 2022-01-01 RX ADMIN — PIPERACILLIN AND TAZOBACTAM 3375 MG: 3; .375 INJECTION, POWDER, LYOPHILIZED, FOR SOLUTION INTRAVENOUS at 11:30

## 2022-01-01 RX ADMIN — SERTRALINE 25 MG: 50 TABLET, FILM COATED ORAL at 10:11

## 2022-01-01 RX ADMIN — DILTIAZEM HYDROCHLORIDE 10 MG: 5 INJECTION INTRAVENOUS at 19:02

## 2022-01-01 RX ADMIN — ACETAMINOPHEN 650 MG: 325 TABLET ORAL at 10:21

## 2022-01-01 RX ADMIN — FINASTERIDE 5 MG: 5 TABLET, FILM COATED ORAL at 13:11

## 2022-01-01 RX ADMIN — CEFEPIME HYDROCHLORIDE 2000 MG: 2 INJECTION, POWDER, FOR SOLUTION INTRAVENOUS at 10:04

## 2022-01-01 RX ADMIN — FINASTERIDE 5 MG: 5 TABLET, FILM COATED ORAL at 14:22

## 2022-01-01 RX ADMIN — AMIODARONE HYDROCHLORIDE 400 MG: 200 TABLET ORAL at 21:15

## 2022-01-01 RX ADMIN — IPRATROPIUM BROMIDE AND ALBUTEROL SULFATE 1 AMPULE: .5; 2.5 SOLUTION RESPIRATORY (INHALATION) at 21:17

## 2022-01-01 RX ADMIN — Medication 10 ML: at 08:24

## 2022-01-01 RX ADMIN — DOXYCYCLINE 100 MG: 100 INJECTION, POWDER, LYOPHILIZED, FOR SOLUTION INTRAVENOUS at 13:03

## 2022-01-01 RX ADMIN — INSULIN LISPRO 1 UNITS: 100 INJECTION, SOLUTION INTRAVENOUS; SUBCUTANEOUS at 20:31

## 2022-01-01 RX ADMIN — SALINE NASAL SPRAY 1 SPRAY: 1.5 SOLUTION NASAL at 10:19

## 2022-01-01 RX ADMIN — Medication 500 MG: at 20:27

## 2022-01-01 RX ADMIN — METOPROLOL SUCCINATE 50 MG: 50 TABLET, EXTENDED RELEASE ORAL at 20:30

## 2022-01-01 RX ADMIN — METOPROLOL SUCCINATE 50 MG: 50 TABLET, EXTENDED RELEASE ORAL at 10:12

## 2022-01-01 RX ADMIN — FUROSEMIDE 40 MG: 10 INJECTION, SOLUTION INTRAMUSCULAR; INTRAVENOUS at 08:53

## 2022-01-01 RX ADMIN — INSULIN LISPRO 1 UNITS: 100 INJECTION, SOLUTION INTRAVENOUS; SUBCUTANEOUS at 20:48

## 2022-01-01 RX ADMIN — Medication 2 CAPSULE: at 15:23

## 2022-01-01 RX ADMIN — AMIODARONE HYDROCHLORIDE 400 MG: 200 TABLET ORAL at 10:54

## 2022-01-01 RX ADMIN — DOXYCYCLINE 100 MG: 100 INJECTION, POWDER, LYOPHILIZED, FOR SOLUTION INTRAVENOUS at 16:33

## 2022-01-01 RX ADMIN — AMIODARONE HYDROCHLORIDE 400 MG: 200 TABLET ORAL at 19:54

## 2022-01-01 RX ADMIN — AMIODARONE HYDROCHLORIDE 400 MG: 200 TABLET ORAL at 20:29

## 2022-01-01 RX ADMIN — SERTRALINE 25 MG: 50 TABLET, FILM COATED ORAL at 08:58

## 2022-01-01 RX ADMIN — INSULIN GLARGINE-YFGN 10 UNITS: 100 INJECTION, SOLUTION SUBCUTANEOUS at 10:25

## 2022-01-01 RX ADMIN — SALINE NASAL SPRAY 1 SPRAY: 1.5 SOLUTION NASAL at 08:42

## 2022-01-01 RX ADMIN — INSULIN LISPRO 2 UNITS: 100 INJECTION, SOLUTION INTRAVENOUS; SUBCUTANEOUS at 17:06

## 2022-01-01 RX ADMIN — PETROLATUM: 420 OINTMENT TOPICAL at 08:43

## 2022-01-01 RX ADMIN — ATORVASTATIN CALCIUM 40 MG: 40 TABLET, FILM COATED ORAL at 08:24

## 2022-01-01 RX ADMIN — Medication 10 ML: at 11:43

## 2022-01-01 RX ADMIN — IPRATROPIUM BROMIDE AND ALBUTEROL SULFATE 1 AMPULE: .5; 2.5 SOLUTION RESPIRATORY (INHALATION) at 09:10

## 2022-01-01 RX ADMIN — FINASTERIDE 5 MG: 5 TABLET, FILM COATED ORAL at 08:59

## 2022-01-01 RX ADMIN — IPRATROPIUM BROMIDE AND ALBUTEROL SULFATE 1 AMPULE: .5; 2.5 SOLUTION RESPIRATORY (INHALATION) at 20:26

## 2022-01-01 RX ADMIN — PROPOFOL 90 MG: 10 INJECTION, EMULSION INTRAVENOUS at 11:08

## 2022-01-01 RX ADMIN — Medication 2 CAPSULE: at 15:25

## 2022-01-01 RX ADMIN — METOPROLOL TARTRATE 25 MG: 25 TABLET, FILM COATED ORAL at 14:59

## 2022-01-01 RX ADMIN — PIPERACILLIN AND TAZOBACTAM 3375 MG: 3; .375 INJECTION, POWDER, LYOPHILIZED, FOR SOLUTION INTRAVENOUS at 11:27

## 2022-01-01 RX ADMIN — FUROSEMIDE 20 MG: 10 INJECTION, SOLUTION INTRAMUSCULAR; INTRAVENOUS at 22:26

## 2022-01-01 RX ADMIN — METOPROLOL SUCCINATE 50 MG: 50 TABLET, EXTENDED RELEASE ORAL at 23:08

## 2022-01-01 RX ADMIN — INSULIN LISPRO 1 UNITS: 100 INJECTION, SOLUTION INTRAVENOUS; SUBCUTANEOUS at 06:28

## 2022-01-01 RX ADMIN — ATORVASTATIN CALCIUM 40 MG: 40 TABLET, FILM COATED ORAL at 08:59

## 2022-01-01 RX ADMIN — INSULIN LISPRO 1 UNITS: 100 INJECTION, SOLUTION INTRAVENOUS; SUBCUTANEOUS at 17:41

## 2022-01-01 RX ADMIN — PIPERACILLIN AND TAZOBACTAM 3375 MG: 3; .375 INJECTION, POWDER, LYOPHILIZED, FOR SOLUTION INTRAVENOUS at 04:22

## 2022-01-01 RX ADMIN — ENOXAPARIN SODIUM 80 MG: 100 INJECTION SUBCUTANEOUS at 20:02

## 2022-01-01 RX ADMIN — TAMSULOSIN HYDROCHLORIDE 0.4 MG: 0.4 CAPSULE ORAL at 13:11

## 2022-01-01 RX ADMIN — ENOXAPARIN SODIUM 80 MG: 100 INJECTION SUBCUTANEOUS at 11:05

## 2022-01-01 RX ADMIN — Medication 2 CAPSULE: at 09:50

## 2022-01-01 RX ADMIN — FINASTERIDE 5 MG: 5 TABLET, FILM COATED ORAL at 10:33

## 2022-01-01 RX ADMIN — FUROSEMIDE 20 MG: 10 INJECTION, SOLUTION INTRAMUSCULAR; INTRAVENOUS at 11:42

## 2022-01-01 RX ADMIN — ATORVASTATIN CALCIUM 40 MG: 40 TABLET, FILM COATED ORAL at 13:11

## 2022-01-01 RX ADMIN — Medication 2 CAPSULE: at 14:36

## 2022-01-01 RX ADMIN — TAMSULOSIN HYDROCHLORIDE 0.4 MG: 0.4 CAPSULE ORAL at 09:50

## 2022-01-01 RX ADMIN — SALINE NASAL SPRAY 1 SPRAY: 1.5 SOLUTION NASAL at 09:50

## 2022-01-01 RX ADMIN — CEFEPIME HYDROCHLORIDE 2000 MG: 2 INJECTION, POWDER, FOR SOLUTION INTRAVENOUS at 20:23

## 2022-01-01 RX ADMIN — ENOXAPARIN SODIUM 80 MG: 100 INJECTION SUBCUTANEOUS at 13:10

## 2022-01-01 RX ADMIN — ATORVASTATIN CALCIUM 40 MG: 40 TABLET, FILM COATED ORAL at 08:53

## 2022-01-01 RX ADMIN — INSULIN LISPRO 2 UNITS: 100 INJECTION, SOLUTION INTRAVENOUS; SUBCUTANEOUS at 16:35

## 2022-01-01 RX ADMIN — ENOXAPARIN SODIUM 40 MG: 100 INJECTION SUBCUTANEOUS at 10:04

## 2022-01-01 RX ADMIN — PIPERACILLIN AND TAZOBACTAM 3375 MG: 3; .375 INJECTION, POWDER, LYOPHILIZED, FOR SOLUTION INTRAVENOUS at 20:52

## 2022-01-01 RX ADMIN — AMIODARONE HYDROCHLORIDE 150 MG: 50 INJECTION, SOLUTION INTRAVENOUS at 16:24

## 2022-01-01 RX ADMIN — Medication 500 MG: at 20:54

## 2022-01-01 RX ADMIN — Medication 10 ML: at 20:42

## 2022-01-01 RX ADMIN — INSULIN LISPRO 3 UNITS: 100 INJECTION, SOLUTION INTRAVENOUS; SUBCUTANEOUS at 11:41

## 2022-01-01 RX ADMIN — INSULIN LISPRO 1 UNITS: 100 INJECTION, SOLUTION INTRAVENOUS; SUBCUTANEOUS at 20:13

## 2022-01-01 RX ADMIN — Medication 10 ML: at 08:53

## 2022-01-01 RX ADMIN — INSULIN LISPRO 1 UNITS: 100 INJECTION, SOLUTION INTRAVENOUS; SUBCUTANEOUS at 18:17

## 2022-01-01 RX ADMIN — FINASTERIDE 5 MG: 5 TABLET, FILM COATED ORAL at 10:25

## 2022-01-01 RX ADMIN — ENOXAPARIN SODIUM 80 MG: 100 INJECTION SUBCUTANEOUS at 10:48

## 2022-01-01 RX ADMIN — PIPERACILLIN AND TAZOBACTAM 3375 MG: 3; .375 INJECTION, POWDER, LYOPHILIZED, FOR SOLUTION INTRAVENOUS at 11:45

## 2022-01-01 RX ADMIN — INSULIN LISPRO 3 UNITS: 100 INJECTION, SOLUTION INTRAVENOUS; SUBCUTANEOUS at 20:54

## 2022-01-01 RX ADMIN — IPRATROPIUM BROMIDE AND ALBUTEROL SULFATE 1 AMPULE: .5; 2.5 SOLUTION RESPIRATORY (INHALATION) at 13:59

## 2022-01-01 RX ADMIN — INSULIN LISPRO 2 UNITS: 100 INJECTION, SOLUTION INTRAVENOUS; SUBCUTANEOUS at 12:42

## 2022-01-01 RX ADMIN — APIXABAN 5 MG: 5 TABLET, FILM COATED ORAL at 20:30

## 2022-01-01 RX ADMIN — PIPERACILLIN AND TAZOBACTAM 3375 MG: 3; .375 INJECTION, POWDER, LYOPHILIZED, FOR SOLUTION INTRAVENOUS at 04:00

## 2022-01-01 RX ADMIN — METOPROLOL TARTRATE 25 MG: 25 TABLET, FILM COATED ORAL at 02:13

## 2022-01-01 RX ADMIN — SALINE NASAL SPRAY 1 SPRAY: 1.5 SOLUTION NASAL at 15:26

## 2022-01-01 RX ADMIN — INSULIN LISPRO 1 UNITS: 100 INJECTION, SOLUTION INTRAVENOUS; SUBCUTANEOUS at 07:18

## 2022-01-01 RX ADMIN — METOPROLOL SUCCINATE 25 MG: 25 TABLET, EXTENDED RELEASE ORAL at 14:22

## 2022-01-01 RX ADMIN — INSULIN GLARGINE-YFGN 10 UNITS: 100 INJECTION, SOLUTION SUBCUTANEOUS at 09:01

## 2022-01-01 RX ADMIN — PROPOFOL 20 MG: 10 INJECTION, EMULSION INTRAVENOUS at 12:08

## 2022-01-01 RX ADMIN — APIXABAN 5 MG: 5 TABLET, FILM COATED ORAL at 20:53

## 2022-01-01 RX ADMIN — PETROLATUM: 420 OINTMENT TOPICAL at 09:49

## 2022-01-01 RX ADMIN — IPRATROPIUM BROMIDE AND ALBUTEROL SULFATE 1 AMPULE: .5; 2.5 SOLUTION RESPIRATORY (INHALATION) at 20:50

## 2022-01-01 RX ADMIN — ASPIRIN 81 MG 81 MG: 81 TABLET ORAL at 14:22

## 2022-01-01 RX ADMIN — IPRATROPIUM BROMIDE AND ALBUTEROL SULFATE 1 AMPULE: .5; 2.5 SOLUTION RESPIRATORY (INHALATION) at 19:53

## 2022-01-01 RX ADMIN — DOXYCYCLINE 100 MG: 100 INJECTION, POWDER, LYOPHILIZED, FOR SOLUTION INTRAVENOUS at 03:27

## 2022-01-01 RX ADMIN — SALINE NASAL SPRAY 1 SPRAY: 1.5 SOLUTION NASAL at 15:33

## 2022-01-01 RX ADMIN — CEFEPIME HYDROCHLORIDE 2000 MG: 2 INJECTION, POWDER, FOR SOLUTION INTRAVENOUS at 20:28

## 2022-01-01 RX ADMIN — PIPERACILLIN AND TAZOBACTAM 3375 MG: 3; .375 INJECTION, POWDER, LYOPHILIZED, FOR SOLUTION INTRAVENOUS at 23:03

## 2022-01-01 RX ADMIN — METOPROLOL SUCCINATE 25 MG: 25 TABLET, EXTENDED RELEASE ORAL at 20:00

## 2022-01-01 RX ADMIN — APIXABAN 5 MG: 5 TABLET, FILM COATED ORAL at 19:54

## 2022-01-01 RX ADMIN — FUROSEMIDE 40 MG: 10 INJECTION, SOLUTION INTRAMUSCULAR; INTRAVENOUS at 08:14

## 2022-01-01 RX ADMIN — METOPROLOL SUCCINATE 50 MG: 50 TABLET, EXTENDED RELEASE ORAL at 10:25

## 2022-01-01 RX ADMIN — PIPERACILLIN AND TAZOBACTAM 3375 MG: 3; .375 INJECTION, POWDER, LYOPHILIZED, FOR SOLUTION INTRAVENOUS at 05:52

## 2022-01-01 RX ADMIN — METOPROLOL SUCCINATE 50 MG: 50 TABLET, EXTENDED RELEASE ORAL at 08:59

## 2022-01-01 RX ADMIN — POLYETHYLENE GLYCOL 3350 17 G: 17 POWDER, FOR SOLUTION ORAL at 11:24

## 2022-01-01 RX ADMIN — DOXYCYCLINE 100 MG: 100 INJECTION, POWDER, LYOPHILIZED, FOR SOLUTION INTRAVENOUS at 12:20

## 2022-01-01 RX ADMIN — SERTRALINE 25 MG: 50 TABLET, FILM COATED ORAL at 15:25

## 2022-01-01 RX ADMIN — Medication 2 CAPSULE: at 08:23

## 2022-01-01 RX ADMIN — DOXYCYCLINE HYCLATE 100 MG: 100 CAPSULE ORAL at 20:28

## 2022-01-01 RX ADMIN — METOPROLOL SUCCINATE 50 MG: 50 TABLET, EXTENDED RELEASE ORAL at 20:53

## 2022-01-01 RX ADMIN — IOPAMIDOL 75 ML: 755 INJECTION, SOLUTION INTRAVENOUS at 16:29

## 2022-01-01 RX ADMIN — TAMSULOSIN HYDROCHLORIDE 0.4 MG: 0.4 CAPSULE ORAL at 09:02

## 2022-01-01 RX ADMIN — IPRATROPIUM BROMIDE AND ALBUTEROL SULFATE 1 AMPULE: .5; 2.5 SOLUTION RESPIRATORY (INHALATION) at 13:18

## 2022-01-01 RX ADMIN — CEFEPIME HYDROCHLORIDE 2000 MG: 2 INJECTION, POWDER, FOR SOLUTION INTRAVENOUS at 04:34

## 2022-01-01 RX ADMIN — Medication 500 MG: at 19:54

## 2022-01-01 RX ADMIN — PROPOFOL 20 MG: 10 INJECTION, EMULSION INTRAVENOUS at 12:15

## 2022-01-01 RX ADMIN — DOXYCYCLINE 100 MG: 100 INJECTION, POWDER, LYOPHILIZED, FOR SOLUTION INTRAVENOUS at 04:24

## 2022-01-01 RX ADMIN — ENOXAPARIN SODIUM 80 MG: 100 INJECTION SUBCUTANEOUS at 20:26

## 2022-01-01 RX ADMIN — APIXABAN 5 MG: 5 TABLET, FILM COATED ORAL at 10:12

## 2022-01-01 RX ADMIN — FINASTERIDE 5 MG: 5 TABLET, FILM COATED ORAL at 10:12

## 2022-01-01 RX ADMIN — IPRATROPIUM BROMIDE AND ALBUTEROL SULFATE 1 AMPULE: .5; 2.5 SOLUTION RESPIRATORY (INHALATION) at 21:02

## 2022-01-01 RX ADMIN — Medication 500 MG: at 20:25

## 2022-01-01 RX ADMIN — METOPROLOL SUCCINATE 50 MG: 50 TABLET, EXTENDED RELEASE ORAL at 19:54

## 2022-01-01 RX ADMIN — INSULIN LISPRO 1 UNITS: 100 INJECTION, SOLUTION INTRAVENOUS; SUBCUTANEOUS at 14:23

## 2022-01-01 RX ADMIN — FINASTERIDE 5 MG: 5 TABLET, FILM COATED ORAL at 10:50

## 2022-01-01 RX ADMIN — FUROSEMIDE 40 MG: 10 INJECTION, SOLUTION INTRAMUSCULAR; INTRAVENOUS at 20:42

## 2022-01-01 RX ADMIN — METOPROLOL SUCCINATE 50 MG: 50 TABLET, EXTENDED RELEASE ORAL at 21:15

## 2022-01-01 RX ADMIN — SERTRALINE 25 MG: 50 TABLET, FILM COATED ORAL at 09:56

## 2022-01-01 RX ADMIN — CEFEPIME HYDROCHLORIDE 2000 MG: 2 INJECTION, POWDER, FOR SOLUTION INTRAVENOUS at 14:30

## 2022-01-01 RX ADMIN — APIXABAN 5 MG: 5 TABLET, FILM COATED ORAL at 08:53

## 2022-01-01 RX ADMIN — INSULIN LISPRO 1 UNITS: 100 INJECTION, SOLUTION INTRAVENOUS; SUBCUTANEOUS at 11:58

## 2022-01-01 RX ADMIN — PIPERACILLIN AND TAZOBACTAM 3375 MG: 3; .375 INJECTION, POWDER, LYOPHILIZED, FOR SOLUTION INTRAVENOUS at 18:59

## 2022-01-01 RX ADMIN — Medication 10 ML: at 09:58

## 2022-01-01 RX ADMIN — PIPERACILLIN AND TAZOBACTAM 3375 MG: 3; .375 INJECTION, POWDER, LYOPHILIZED, FOR SOLUTION INTRAVENOUS at 12:04

## 2022-01-01 RX ADMIN — Medication 500 MG: at 23:08

## 2022-01-01 RX ADMIN — FUROSEMIDE 40 MG: 10 INJECTION, SOLUTION INTRAMUSCULAR; INTRAVENOUS at 21:03

## 2022-01-01 RX ADMIN — CEFEPIME HYDROCHLORIDE 2000 MG: 2 INJECTION, POWDER, FOR SOLUTION INTRAVENOUS at 20:19

## 2022-01-01 RX ADMIN — FINASTERIDE 5 MG: 5 TABLET, FILM COATED ORAL at 08:53

## 2022-01-01 RX ADMIN — AMIODARONE HYDROCHLORIDE 400 MG: 200 TABLET ORAL at 20:53

## 2022-01-01 RX ADMIN — AMIODARONE HYDROCHLORIDE 400 MG: 200 TABLET ORAL at 23:08

## 2022-01-01 RX ADMIN — FINASTERIDE 5 MG: 5 TABLET, FILM COATED ORAL at 09:50

## 2022-01-01 RX ADMIN — IPRATROPIUM BROMIDE AND ALBUTEROL SULFATE 1 AMPULE: .5; 2.5 SOLUTION RESPIRATORY (INHALATION) at 08:16

## 2022-01-01 RX ADMIN — METOPROLOL SUCCINATE 25 MG: 25 TABLET, EXTENDED RELEASE ORAL at 20:26

## 2022-01-01 RX ADMIN — MAGNESIUM SULFATE HEPTAHYDRATE 2000 MG: 40 INJECTION, SOLUTION INTRAVENOUS at 01:42

## 2022-01-01 RX ADMIN — Medication 10 ML: at 20:04

## 2022-01-01 RX ADMIN — AMIODARONE HYDROCHLORIDE 400 MG: 200 TABLET ORAL at 09:50

## 2022-01-01 RX ADMIN — ATORVASTATIN CALCIUM 40 MG: 40 TABLET, FILM COATED ORAL at 14:22

## 2022-01-01 RX ADMIN — Medication 2 CAPSULE: at 20:53

## 2022-01-01 RX ADMIN — PIPERACILLIN AND TAZOBACTAM 3375 MG: 3; .375 INJECTION, POWDER, LYOPHILIZED, FOR SOLUTION INTRAVENOUS at 11:40

## 2022-01-01 RX ADMIN — Medication 10 ML: at 20:27

## 2022-01-01 RX ADMIN — METOPROLOL SUCCINATE 50 MG: 50 TABLET, EXTENDED RELEASE ORAL at 09:50

## 2022-01-01 RX ADMIN — FUROSEMIDE 20 MG: 10 INJECTION, SOLUTION INTRAMUSCULAR; INTRAVENOUS at 09:10

## 2022-01-01 RX ADMIN — INSULIN LISPRO 1 UNITS: 100 INJECTION, SOLUTION INTRAVENOUS; SUBCUTANEOUS at 20:35

## 2022-01-01 ASSESSMENT — PAIN SCALES - GENERAL
PAINLEVEL_OUTOF10: 3
PAINLEVEL_OUTOF10: 0
PAINLEVEL_OUTOF10: 6
PAINLEVEL_OUTOF10: 0
PAINLEVEL_OUTOF10: 7
PAINLEVEL_OUTOF10: 7
PAINLEVEL_OUTOF10: 0

## 2022-01-01 ASSESSMENT — PAIN DESCRIPTION - FREQUENCY: FREQUENCY: CONTINUOUS

## 2022-01-01 ASSESSMENT — ENCOUNTER SYMPTOMS
SHORTNESS OF BREATH: 1
BACK PAIN: 0
CHEST TIGHTNESS: 0
DIARRHEA: 0
SHORTNESS OF BREATH: 1
SHORTNESS OF BREATH: 1
ABDOMINAL DISTENTION: 0
VOMITING: 0
PHOTOPHOBIA: 0
ABDOMINAL PAIN: 0
COUGH: 0
NAUSEA: 0

## 2022-01-01 ASSESSMENT — PAIN DESCRIPTION - ORIENTATION: ORIENTATION: LOWER

## 2022-01-01 ASSESSMENT — EJECTION FRACTION
EF_VALUE: 50-55%
EF_SOURCE: 2D ECHO

## 2022-01-01 ASSESSMENT — PAIN DESCRIPTION - PAIN TYPE
TYPE: ACUTE PAIN
TYPE: ACUTE PAIN

## 2022-01-01 ASSESSMENT — PAIN DESCRIPTION - LOCATION
LOCATION: ARM;LEG
LOCATION: ARM

## 2022-01-01 ASSESSMENT — PAIN DESCRIPTION - DESCRIPTORS: DESCRIPTORS: ACHING;CONSTANT;DISCOMFORT

## 2022-01-01 ASSESSMENT — PAIN DESCRIPTION - ONSET: ONSET: ON-GOING

## 2022-04-08 PROBLEM — I50.9 HEART FAILURE (HCC): Status: ACTIVE | Noted: 2022-01-01
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
## 2022-04-08 NOTE — PROGRESS NOTES
Name: Constantin Hdz  : 1934  MRN: [id number]    Date: 2022    Benefits of immediately proceeding with Radiology exam outweigh the risks and therefore the following is being waived:      [] Pregnancy test    [] Protocol for Iodine allergy    [] MRI questionnaire    [x] Claude Randy, MD

## 2022-04-09 NOTE — PROGRESS NOTES
Hospitalist Progress Note      SYNOPSIS: Patient admitted on 2022   Mr. Yashira Landin, a 80y.o. year old male  who  has a past medical history of CAD, CLL, Hyperlipidemia, Hypertension, Leukemia , Muscle weakness, and Urinary retention.      [facility] ED with concerns of heart failure. Patient denies fever, chills, chest pain, nausea, vomiting, abdominal pain. Vital signs reveal the patient to be hypoxic with an SPO2 of 88% on room air. Improved with nasal cannula. The patient is afebrile. Heart rate of 127. EKG shows sinus tachycardia although this could be atrial flutter. Patient was given a dose of digoxin as well as Cardizem in the emergency department without much effect. Laboratory studies demonstrate BUN 26, creatinine 1.4, glucose 213, proBNP 3513, troponin of 43, WBC 12.8. CT chest shows right upper and right middle lobe pulmonary nodules. Right middle lobe lingular and bilateral lower lobe infiltrates. Urinalysis positive for infection. Patient was given Lasix and started on doxycycline and cefepime. SUBJECTIVE:    Patient seen and examined  Records reviewed. The pt states he is short of breath, selma any chest pain nausea or vomiting. Pt is a poor historian. Temp (24hrs), Av.4 °F (36.3 °C), Min:97.4 °F (36.3 °C), Max:97.4 °F (36.3 °C)    DIET: ADULT DIET; Regular; Low Sodium (2 gm)  CODE: Full Code  No intake or output data in the 24 hours ending 22 1202    OBJECTIVE:    /71   Pulse 129   Temp 97.4 °F (36.3 °C) (Oral)   Resp 26   Ht 6' 1\" (1.854 m)   Wt 196 lb (88.9 kg)   SpO2 93%   BMI 25.86 kg/m²     General appearance: No apparent distress, appears stated age and cooperative. HEENT:  Conjunctivae/corneas clear.    Respiratory: Clear to auscultation bilaterally  Cardiovascular:Tachycardia, systolic murmur  Abdomen: Soft, nontender, nondistended  Musculoskeletal: No clubbing, cyanosis, 2+ bilateral lower extremity edema. Skin:  No rashes  on visible skin, scratch marks on lower ext. Neurologic: awake, alert and following commands     ASSESSMENT:    HCAP  Acute respiratory failure with hypoxia  -Patient started on doxycycline and cefepime  -Respiratory cultures negative  -Cultures pending  -Consult pulmonology for lymphadenopathy and pulmonary nodule    Acute CHF type unclear at this time  -Decrease IV lasix to once daily. -ECHO. No recent ECHO noted. UTI  -Cont cefepime  -Follow Urine Cultures    CAD  Elevated troponin  Possible A-Flutter  -Pt received Digoxin, IV Cardizem in ED  -Consult Cardiology.   -Cont ASA, Statin, BB    Diabetes Mellitus  -A1C 7.2 in 2021  -SSI    History of CLL   CKD stage 3    Urinary retention  -Cont Proscar and Flomax    PTOT  Palliative care. Pt is full code at this time. DISPOSITION:     Medications:  REVIEWED DAILY    Infusion Medications    sodium chloride       Scheduled Medications    aspirin  81 mg Oral Daily    atorvastatin  40 mg Oral Daily    finasteride  5 mg Oral Daily    metoprolol tartrate  25 mg Oral BID    niacin  500 mg Oral Nightly    tamsulosin  0.4 mg Oral Daily    sodium chloride flush  5-40 mL IntraVENous 2 times per day    enoxaparin  40 mg SubCUTAneous Daily    cefepime  2,000 mg IntraVENous Q12H    doxycycline (VIBRAMYCIN) IV  100 mg IntraVENous Q12H    furosemide  20 mg IntraVENous BID     PRN Meds: sodium chloride flush, sodium chloride, ondansetron **OR** ondansetron, polyethylene glycol, acetaminophen **OR** acetaminophen    Labs:     Recent Labs     04/08/22  1604   WBC 12.8*   HGB 13.6   HCT 43.6   *       Recent Labs     04/08/22  1604      K 4.4      CO2 28   BUN 26*   CREATININE 1.4*   CALCIUM 9.7       Recent Labs     04/08/22  1604   PROT 5.6*   ALKPHOS 116   ALT 25   AST 19   BILITOT 0.7       No results for input(s): INR in the last 72 hours.     No results for input(s): Angel Roberts in the last 72 hours. Chronic labs:    Lab Results   Component Value Date    CHOL 119 07/06/2021    TRIG 81 07/06/2021    HDL 46 07/06/2021    LDLCALC 57 07/06/2021    TSH 1.211 10/09/2012    INR 1.2 05/16/2019    LABA1C 7.2 07/06/2021       Radiology: REVIEWED DAILY    +++++++++++++++++++++++++++++++++++++++++++++++++  Caryn Hope MD  [facility] Physician - [address] [place] [address]  +++++++++++++++++++++++++++++++++++++++++++++++++  NOTE: This report was transcribed using voice recognition software. Every effort was made to ensure accuracy; however, inadvertent computerized transcription errors may be present.

## 2022-04-09 NOTE — PROGRESS NOTES
Cardiology consult placed via perfect serve to [facility].   Dr Scott Newman taking new consults

## 2022-04-09 NOTE — ED NOTES
Notified Dr. Mary Frausto that pt's HR was elevated and mewsing a 5. This RN gave missed metoprolol dose from AM.  ordered fluids and stated that he is able to go to floor.       Robb Gordon  04/09/22 4493
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
## 2022-04-10 NOTE — PROGRESS NOTES
Pulmonary consult placed to on call Pulmonologist since patient is not currently established with anyone at this time. Dr Nelson Gaxiola taking new consults. Patient information given to answering service.

## 2022-04-10 NOTE — CONSULTS
Inpatient Cardiology Consultation      Reason for Consult:  elevated troponin / CHF     Consulting Physician: Dr Munguia Precise    Requesting Physician:  Dr Martínez Sandoval    Date of Consultation: 4/10/2022    HISTORY OF PRESENT ILLNESS:   Mr Valentina Maza is an 80-year-old male who is not previously known to [facility] cardiology. He previously saw a cardiologist in [facility] at the time of his CABG but otherwise has not been following with cardiology as an outpatient. 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 Presented to [facility] 4/9/2022 with shortness of breath  VS: 97.4-125-18-97% RA-106/70. Labs: WBC 12.8, H&H 13.6/43.6, platelets 084. K+ 4.4, BUN/SCR 26/1.4, glucose 213. Troponin 47 -- 43  proBNP 3513  Procalcitonin 0.09     Urinalysis positive     XR right hip/pelvis. No fracture or dislocation. Lumbar scoliosis  CT head: No acute intracranial abnormality or hemorrhage. Small right frontal subdural hygroma  CTA chest: No evidence of pulmonary embolism, distal segmental branches of the posterior lower lobes. Right upper and lower lobe pulmonary nodules. Right middle and lower lobe infiltrates. CXR suspected pulmonary vascular congestion. Upon presentation to the emergency department he was hypoxic with O2 sats in the 80s. He was placed on 4 L nasal cannula with improvement to the 90s. He was tachycardic and given 1 dose of digoxin as well as Cardizem in the emergency department. He was admitted to a telemetry monitoring floor with pneumonia and suspected congestive heart failure with UTI. He was started on antibiotics.   Palliative care was consulted for goals of care discussion --patient wishes to discuss with his family and follow-up with decisions later during this hospitalization. Pulmonology has been consulted. Cardiology was asked see the patient for further recommendations and management of elevated troponin and congestive heart failure. He states that he came into the hospital because at his assisted living facility he fell into the shower when the nurse put his chair up too quickly. He denies any chest pain or palpitations. He states he has chronic lower extremity edema especially in his right leg with neuropathy and numbness. He admits to chronic shortness of breath although he is wheelchair-bound with very limited activity. Please note: past medical records were reviewed per electronic medical record (EMR) - see detailed reports under Past Medical/ Surgical History. Past Medical History:    1. TTE 2012 : No comparison study available. Technically limited study, which might have   affected the accuracy of the interpretation. Moderate asymmetric septal hypertrophy. Ejection fraction is visually estimated at 45-50%. Mildly depressed left ventricular systolic function. No regional wall motion abnormalities seen. E/A flow reversal noted. Suggestive of diastolic dysfunction. Physiologic and/or trace mitral regurgitation is present. Mild tricuspid regurgitation. RVSP is 47 mmHg. Pulmonary hypertension is moderate . 2. CABG (20 years ago) x3. [place] PA. No records available  3. Hypertension  4. Hyperlipidemia  5. Previous tobacco abuse  6. Chronic lower extremity edema with peripheral neuropathy. Essentially wheelchair-bound  7. Clinically deconditioned  8. History of appendectomy, C4-C7 cervical fusion, ORIF right ankle, ORIF        Medications Prior to admit:  Prior to Admission medications    Medication Sig Start Date End Date Taking?  Authorizing Provider   guaiFENesin 200 MG tablet Take 400 mg by mouth every 4 hours as needed for Congestion   Yes Historical Provider, MD   loperamide (IMODIUM) 2 MG capsule Take 2 mg by mouth 2 times daily as needed for Diarrhea   Yes Historical Provider, MD   bisacodyl (DULCOLAX) 5 MG EC tablet Take 5 mg by mouth 2 times daily as needed for Constipation   Yes Historical Provider, MD   magnesium hydroxide (MILK OF MAGNESIA) 400 MG/5ML suspension Take 15 mLs by mouth daily   Yes Historical Provider, MD   Multiple Vitamins-Minerals (MULTIVITAMINS/MINERALS ADULT PO) Take 1 tablet by mouth daily   Yes Historical Provider, MD   Cholecalciferol 50 MCG (2000 UT) TABS Take 2,000 Units by mouth daily   Yes Historical Provider, MD   furosemide (LASIX) 20 MG tablet Take 20 mg by mouth 2 times daily   Yes Historical Provider, MD   polyvinyl alcohol (LIQUIFILM TEARS) 1.4 % ophthalmic solution Place 1 drop into both eyes 4 times daily     Historical Provider, MD   aspirin 81 MG EC tablet Take 81 mg by mouth daily     Historical Provider, MD   niacin 500 MG extended release capsule Take 500 mg by mouth nightly    Historical Provider, MD   tamsulosin (FLOMAX) 0.4 MG capsule Take 0.4 mg by mouth 2 times daily     Historical Provider, MD   atorvastatin (LIPITOR) 80 MG tablet Take 40 mg by mouth daily     Historical Provider, MD   finasteride (PROSCAR) 5 MG tablet Take 5 mg by mouth daily  9/3/15   Historical Provider, MD   metoprolol (LOPRESSOR) 25 MG tablet Take 1 tablet by mouth 2 times daily.  11/28/12   Connie Burkett MD       Current Medications:    Current Facility-Administered Medications: aspirin chewable tablet 81 mg, 81 mg, Oral, Daily  atorvastatin (LIPITOR) tablet 40 mg, 40 mg, Oral, Daily  finasteride (PROSCAR) tablet 5 mg, 5 mg, Oral, Daily  metoprolol tartrate (LOPRESSOR) tablet 25 mg, 25 mg, Oral, BID  niacin extended release capsule 500 mg, 500 mg, Oral, Nightly  tamsulosin (FLOMAX) capsule 0.4 mg, 0.4 mg, Oral, Daily  sodium chloride flush 0.9 % injection 5-40 mL, 5-40 mL, IntraVENous, 2 times per day  sodium chloride flush 0.9 % injection 5-40 mL, 5-40 mL, IntraVENous, PRN  0.9 % sodium chloride infusion, , IntraVENous, PRN  polyethylene glycol (GLYCOLAX) packet 17 g, 17 g, Oral, Daily PRN  acetaminophen (TYLENOL) tablet 650 mg, 650 mg, Oral, Q6H PRN **OR** acetaminophen (TYLENOL) suppository 650 mg, 650 mg, Rectal, Q6H PRN  enoxaparin (LOVENOX) injection 40 mg, 40 mg, SubCUTAneous, Daily  cefepime (MAXIPIME) 2000 mg IVPB minibag, 2,000 mg, IntraVENous, Q12H  doxycycline (VIBRAMYCIN) 100 mg in dextrose 5 % 100 mL IVPB, 100 mg, IntraVENous, Q12H  furosemide (LASIX) injection 20 mg, 20 mg, IntraVENous, Daily  insulin lispro (HUMALOG) injection vial 0-6 Units, 0-6 Units, SubCUTAneous, TID WC  insulin lispro (HUMALOG) injection vial 0-3 Units, 0-3 Units, SubCUTAneous, Nightly  glucose (GLUTOSE) 40 % oral gel 15 g, 15 g, Oral, PRN  dextrose 50 % IV solution, 12.5 g, IntraVENous, PRN  glucagon (rDNA) injection 1 mg, 1 mg, IntraMUSCular, PRN  dextrose 5 % solution, 100 mL/hr, IntraVENous, PRN    Allergies:  Patient has no known allergies. Social History:    Lives at [facility]. Wheelchair-bound secondary to peripheral neuropathy. Very limited activity. Does not require supplemental oxygen    Previous tobacco abuse, quit 20 years ago  Denies alcohol or illicit drug use  Full code    Family History: This information was not obtained at this time as it is found noncontributory secondary to the patients advanced age. REVIEW OF SYSTEMS:     · Constitutional: Denies fevers, chills, night sweats, and fatigue  · HEENT: Denies headaches, nose bleeds, and blurred vision,oral pain, abscess or lesion. · Musculoskeletal: Denies falls, pain to BLE with ambulation and edema to BLE. · Neurological: Denies dizziness and lightheadedness, numbness and tingling  · Cardiovascular: Denies chest pain, palpitations, and feelings of heart racing. · Respiratory: Denies orthopnea and PND, cough, CAST  · Gastrointestinal: Denies heartburn, nausea/vomiting, diarrhea and constipation, black/bloody, and tarry stools. · Genitourinary: Denies dysuria and hematuria  · Hematologic: Denies excessive bruising or bleeding  · Lymphatic: Denies lumps and bumps to neck, axilla, breast, and groin      PHYSICAL EXAM:   /60   Pulse 124   Temp 97.9 °F (36.6 °C)   Resp 30   Ht 6' 1\" (1.854 m)   Wt 190 lb 14.4 oz (86.6 kg)   SpO2 100%   BMI 25.19 kg/m²   CONST:  Well developed,  male who appears his stated age. Awake, alert, cooperative, no apparent distress  HEENT:   Head- Normocephalic, atraumatic   Eyes- Conjunctivae pink, anicteric  Throat- Oral mucosa pink and moist  Neck-  No stridor, trachea midline, no jugular venous distention. CHEST: Chest symmetrical and non-tender to palpation. RESPIRATORY: Lung sounds clear throughout fields bilaterally. No wheeze or rhonchi noted. CARDIOVASCULAR:     No carotid bruit noted bilaterally   Heart Ausculation irregular rate, regular rhythm. No murmur. PV: 2+ lower extremity edema with chronic stasis dermatitis   ABDOMEN: Soft, non-tender to light palpation. Bowel sounds present. MS: Good muscle strength and tone. No atrophy or abnormal movements. : Deferred   SKIN: Warm and dry. Chronic statis dermatitis and ulcers   NEURO / PSYCH: Oriented to person, place and time. Speech clear and appropriate. Follows all commands. Pleasant affect     DATA:    ECG: Narrow complex tachycardia, a flutter versus sinus tach. Nonspecific ST-T wave changes inferior lateral leads. Heart rate 124  Tele strips: Narrow complex tachycardia, heart rate 120s. Atrial flutter versus sinus tachycardia.     Diagnostic:      Intake/Output Summary (Last 24 hours) at 4/10/2022 0909  Last data filed at 4/10/2022 0508  Gross per 24 hour   Intake 404.43 ml   Output 350 ml   Net 54.43 ml       Labs:   CBC:   Recent Labs     04/08/22  1604 04/09/22 1812   WBC 12.8* 17.7*   HGB 13.6 14.0   HCT 43.6 45.2   * 121*     BMP:   Recent Labs     04/09/22  1812 04/10/22  0545    144   K 4.9 4.0 CO2 20* 24   BUN 27* 28*   CREATININE 1.5* 1.3*   LABGLOM 44 52   CALCIUM 9.4 9.3     Mag:   Recent Labs     04/09/22  1812 04/10/22  0545   MG 1.8 1.8     HgA1c:   Lab Results   Component Value Date    LABA1C 7.2 07/06/2021     FASTING LIPID PANEL:  Lab Results   Component Value Date    CHOL 93 04/09/2022    HDL 33 04/09/2022    LDLCALC 44 04/09/2022    TRIG 81 04/09/2022     LIVER PROFILE:  Recent Labs     04/08/22  1604   AST 19   ALT 25   LABALBU 3.6       Assessment:   1. Narrow complex tachycardia. Suspicious for atrial flutter versus sinus tachycardia. Onset unknown. 2. [id number] score at least 3 (hypertension, age, coronary artery disease)  3. Elevated troponin, pattern not consistent with ACS. Likely secondary to demand ischemia given tachycardia and CKD. Nonspecific ST-T wave changes on EKG. Denies chest pain. 4. History of coronary artery disease with a CABG x3 in the remote past.  No records available or recent ischemic evaluation per patient. 5. Elevated proBNP, no previous to compare. Patient appears mildly decompensated. 6. Right lobe pneumonia, on antibiotics  7. Pulmonary nodules noted on CTA. Pulmonology consulted  8. LAMAR on CKD, serum creatinine trending down. 9. Borderline soft blood pressures with a history of hypertension  10. Hyperlipidemia on statin therapy  11. Previous tobacco abuse  12. Chronic thrombocytopenia. Platelets stable  13. Type 2 diabetes mellitus with peripheral neuropathy,Wheelchair-bound  14. Clinical deconditioning. Plan:   · Continue aspirin statin  · Start therapeutic Lovenox, dose adjusted  · Change Lopressor to Toprol-XL 25 mg twice daily. Uptitrate as necessary for heart rate control. · Monitor telemetry  · Continue IV diuresis per primary service. Monitor BMP and I&O   · Echocardiogram to assess ventricular function and valvular heart disease when heart rate less than 100.   · May consider DCCV pending results of echocardiogram  · Further recommendations pending results of the above. ·  Discussed with Dr. Ese Marcos will follow    Electronically signed by Trina Deluna on 4/10/2022 at 9:09 AM       Patient chart reviewed with Trina Deluna. Patient seen and examined independently in the presence of his daughter. Assessment and plan were made in collaboration with Trina Deluna.    66-year-old male who is seen in consultation due to elevated troponin. He has history of CAD, status post triple-vessel CABG 20 years ago in [place], hypertension, hyperlipidemia, chronic kidney disease, chronic anemia, chronic lower extremity edema and neuropathy. Patient lives in nursing home. He had a fall yesterday in the bathroom with no loss of consciousness. Patient uses a wheelchair to ambulate. He denies chest pain but complains of chronic dyspnea on exertion and bilateral lower extremity edema. He had slurred speech 3 days ago according to his daughter. His physical exam is pertinent for:  Blood pressure 106/70 and heart rate 125 bpm.  Elderly male laying in bed in no acute distress. No carotid bruit and no jugular venous tension. Regular heart and tachycardic with no murmur, rub or gallop. Decreased air entry at the right lung base with poor inspiratory effort. Soft nontender abdomen with no abdominal bruit. 1-2+ bilateral pitting lower extremity edema with decreased pedal pulses. EKG revealed narrow complex tachycardia at 124 bpm probably due to atrial flutter and less likely sinus tachycardia. There were nonspecific ST-T wave changes. There was QTC prolongation at 528. Artifacts were noted. CTA chest: No evidence of pulmonary embolism, distal segmental branches of the posterior lower lobes. Right upper and lower lobe pulmonary nodules. Right middle and lower lobe infiltrates. CXR suspected pulmonary vascular congestion. Labs:   WBC 17.7, hematocrit 45.2, hemoglobin 14 and platelets 146.   BUN 28, creatinine 1.5 yesterday and 1.3 today and potassium 4.0  Hemoglobin A1c 7.2. Assessment:   1. Narrow complex tachycardia: Probably due to atrial flutter and less likely sinus tachycardia. Onset unknown. [id number] score at least 4 (hypertension, age, coronary artery disease)  2. Elevated troponin, pattern not consistent with ACS. Likely secondary to demand ischemia given tachycardia and CKD. Nonspecific ST-T wave changes on EKG. 3. History of coronary artery disease with a CABG x3 in the remote past.  No records available or recent ischemic evaluation per patient. 4. Acute on chronic CHF with unknown ejection fraction:  Patient appears mildly decompensated. 5. Right lobe pneumonia, on antibiotics  6. Pulmonary nodules noted on CTA. Pulmonology consulted  7. LAMAR on CKD, serum creatinine trending down. 8. Borderline soft blood pressures with a history of hypertension  9. Hyperlipidemia on statin therapy  10. Previous tobacco abuse  11. Chronic thrombocytopenia. Platelets stable  12. Type 2 diabetes mellitus with peripheral neuropathy,Wheelchair-bound  13. Clinical deconditioning. Plan:   · Continue aspirin and statin  · Start therapeutic Lovenox, dose adjusted for age and kidney function  · Change Lopressor to Toprol-XL 25 mg twice daily. Uptitrate as necessary for heart rate control. · Monitor telemetry  · Continue IV diuresis per primary service. Monitor BMP and I&O   · Echocardiogram to assess ventricular function and valvular heart disease when heart rate less than 100. · May consider GOVIND / DCCV pending results of echocardiogram and when pneumonia and CHF resolve. Thank you for allowing me to share in the care of patient.

## 2022-04-10 NOTE — PROGRESS NOTES
Hospitalist Progress Note      SYNOPSIS: Patient admitted on 2022   Mr. Yamilex Garza, a 80y.o. year old male  who  has a past medical history of CAD, CLL, Hyperlipidemia, Hypertension, Leukemia , Muscle weakness, and Urinary retention.      [facility] He was sent from the [address] [place] [address] with concerns of heart failure. Patient denies fever, chills, chest pain, nausea, vomiting, abdominal pain. Vital signs reveal the patient to be hypoxic with an SPO2 of 88% on room air. Improved with nasal cannula. The patient is afebrile. Heart rate of 127. EKG shows sinus tachycardia although this could be atrial flutter. Patient was given a dose of digoxin as well as Cardizem in the emergency department without much effect. Laboratory studies demonstrate BUN 26, creatinine 1.4, glucose 213, proBNP 3513, troponin of 43, WBC 12.8. CT chest shows right upper and right middle lobe pulmonary nodules. Right middle lobe lingular and bilateral lower lobe infiltrates. Urinalysis positive for infection. Patient was given Lasix and started on doxycycline and cefepime. SUBJECTIVE:    Patient seen and examined  Records reviewed. The pt states he is short of breath, selma any chest pain nausea or vomiting. HR noted to be in 120s. Pt given metoprolol. Temp (24hrs), Av.1 °F (36.7 °C), Min:97.5 °F (36.4 °C), Max:98.9 °F (37.2 °C)    DIET: ADULT DIET; Regular; Low Sodium (2 gm)  CODE: Full Code    Intake/Output Summary (Last 24 hours) at 4/10/2022 1009  Last data filed at 4/10/2022 1001  Gross per 24 hour   Intake 644.43 ml   Output 350 ml   Net 294.43 ml       OBJECTIVE:    /60   Pulse 124   Temp 97.9 °F (36.6 °C)   Resp 30   Ht 6' 1\" (1.854 m)   Wt 190 lb 14.4 oz (86.6 kg)   SpO2 100%   BMI 25.19 kg/m²     General appearance: No apparent distress, appears stated age and cooperative. HEENT:  Conjunctivae/corneas clear.    Respiratory: Clear to Recent Labs     04/08/22  1604 04/09/22  1812 04/10/22  0545    137 144   K 4.4 4.9 4.0    106 105   CO2 28 20* 24   BUN 26* 27* 28*   CREATININE 1.4* 1.5* 1.3*   CALCIUM 9.7 9.4 9.3       Recent Labs     04/08/22  1604   PROT 5.6*   ALKPHOS 116   ALT 25   AST 19   BILITOT 0.7       No results for input(s): INR in the last 72 hours. No results for input(s): Milvia Belts in the last 72 hours. Chronic labs:    Lab Results   Component Value Date    CHOL 93 04/09/2022    TRIG 81 04/09/2022    HDL 33 04/09/2022    LDLCALC 44 04/09/2022    TSH 1.211 10/09/2012    INR 1.2 05/16/2019    LABA1C 7.2 07/06/2021       Radiology: REVIEWED DAILY    +++++++++++++++++++++++++++++++++++++++++++++++++  Johnathon Heck MD  [facility] - [address], [place]  +++++++++++++++++++++++++++++++++++++++++++++++++  NOTE: This report was transcribed using voice recognition software. Every effort was made to ensure accuracy; however, inadvertent computerized transcription errors may be present.

## 2022-04-10 NOTE — CONSULTS
Nutrition Education    Pt reported that he resides at an assisted living facility and that they prepare all meals for him. He was unable to recall which facility he is located but noted that low sodium meals are not available. Encouraged pt to request for food to be unsalted if able and avoid salting food at the table. · Verbally reviewed information with Patient  · Educated on Low Sodium Diet  · Written educational materials provided. · Contact name and number provided. Electronically signed by Madelaine Whitlock RD, LD on 4/10/22 at 2:45 PM EDT    Contact: [phone]    Spent 5 minutes educating pt on low sodium diet.
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
## 2022-04-10 NOTE — PROGRESS NOTES
[address]      Date:4/10/2022                                                  Patient Name: Som De Leon  MRN: [id number]  : 1934  Room: [address] [place] [address]    Evaluating OT: MAURY Giraldo, OTR/BETH [id number]  Referring Milan Diver, DO  Specific Provider Orders: OT eval and treat   Recommended Adaptive Equipment: 24 hr physical assist     Diagnosis: heart failure (s/p fall, no fxs found)   Surgery: none   Pertinent Medical History: CAD, CLL, HTN, HLD   Precautions:  Fall Risk, O2    Assessment of current deficits   [x] Functional mobility  [x]ADLs  [x] Strength               [x]Cognition   [x] Functional transfers   [x] IADLs         [x] Safety Awareness   [x]Endurance   [] Fine Coordination              [x] Balance      [] Vision/perception   [x]Sensation    []Gross Motor Coordination  [] ROM  [] Delirium                   [] Motor Control     OT PLAN OF CARE   OT POC based on physician orders, patient diagnosis and results of clinical assessment    Frequency/Duration: 2-3 days/wk for 2 weeks PRN   Specific OT Treatment to include:   * Instruction/training on adapted ADL techniques and AE recommendations to increase functional independence within precautions       * Training on energy conservation strategies, correct breathing pattern and techniques to improve independence/tolerance for self-care routine  * Functional transfer/mobility training/DME recommendations for increased independence, safety, and fall prevention  * Patient/Family education to increase follow through with safety techniques and functional independence  * Recommendation of environmental modifications for increased safety with functional transfers/mobility and ADLs  * Cognitive retraining/development of therapeutic activities to improve problem solving, judgement, memory, and attention for increased safety/participation in ADL/IADL tasks  * Therapeutic exercise to improve motor endurance, ROM, and functional strength for ADLs/functional transfers  * Therapeutic activities to facilitate/challenge dynamic balance, stand tolerance for increased safety and independence with ADLs  * Neuro-muscular re-education: facilitation of righting/equilibrium reactions, midline orientation, scapular stability/mobility, normalization of muscle tone, and facilitation of volitional active controled movement    Home Living: Pt presents from the [place]. He reports using electric w/c and SPTs. Pain Level: 0/10  Cognition: A&O: 3/4; Follows 1 step directions, with repetition and increased time   Memory:  fair    Sequencing:  fair    Problem solving:  fair    Judgement/safety:  fair     Functional Assessment:  AM-PAC Daily Activity Raw Score: 14/24   Initial Eval Status  Date: 4/10/22 Treatment Status  Date: STGs=LTGs  Time Frame: 10-14 days   Feeding SBA   SUP   Grooming SBA (seated for facial washing)   SUP   UB Dressing Mod A (due to limited ROM)   Min A   LB Dressing Max A (assist with socks)  Mod A    Bathing Max A (simulated)  Mod A    Toileting Max A  Mod A   Bed Mobility  Log roll: Mod A  Supine to sit: Mod A   Sit to supine: Mod A   Log roll CGA  Supine to sit: CGA   Sit to supine: CGA   Functional Transfers Sit to stand: Mod A   Stand to sit:Mod A  Commode: NT (RN notified to order [facility] for rm)  CGA   Functional Mobility NT (pt too fearful)  Min A if appropriate (pt reported not completing prior and only completed SPTs)   Balance Sitting: SBA  Standing: Min A     Activity Tolerance fair     Visual/  Perceptual Glasses: yes              UE ROM: BUE: elbow flex WFL, shoulder flex grossly 45'  Strength: RUE: grossly 3/5 LUE: grossly 3/5   Strength: B WFL  Fine Motor Coordination:  fair    Hearing: WFL  Sensation:  No c/o numbness/tingling   Tone:  WFL  Edema: BLEs                            Comments:Cleared by RN to see pt.  Upon arrival, patient supine in bed and agreeable to OT session. At end of session, patient sitting in chair with call light and phone within reach, all lines and tubes intact. Pt would benefit from continued OT to increase functional independence and quality of life. Treatment: Pt appeared latent with following commands and answering questions. Pt required vc's for proper technique/safety with hand placement/body mechanics/posture for bed mobility/ADLs/functional tranfers/ww management. Pt required vc's for sequencing/initiation of ADLs/functional transfers. Pt able to  sit EOB ~10 mins to increase core strength/balance/activity tolerance for ease with ADLs. Mod A for SPT in preparation for commode transfer. Pt appeared self limiting and required max encouragement to complete tasks. Attempted monitoring of SpO2 but therapist was not successful with multiple attempts. Pt required rest breaks during session. Pt appeared to have tolerated session fairly and appears cooperative/pleasant . Pt instructed on use of call light for assistance and fall prevention. Pt demo'ing fair understanding of education provided. Continue to educate. Eval Complexity: moderate  · History: Expanded chart review of medical records and additional review of physical, cognitive, or psychosocial history related to current functional performance  · Exam: 3+ performance deficits  · Assistance/Modification: Min/mod assistance or modifications required to perform tasks. May have comorbidities that affect occupational performance. Rehab Potential: Good for established goals, pt. assisted in establishment of goals. LTG: maximize independence with ADLs to return to PLOF    Patient instructed on diagnosis, prognosis/goals and plan of care. Demonstrated fair understanding. [] Malnutrition indicators have been identified and nursing has been notified to ensure a dietitian consult is ordered.      Evaluation time includes thorough review of current medical information, gathering information on past medical & social history & PLOF, completion of standardized testing, informal observation of tasks, consultation with other medical professions/disciplines, assessment of data & development of POC/goals.      Time In: 3206       Time Out: 1055     Total treatment time: 15       Treatment Charges: Mins Units   OT Eval Low 12792     OT Eval Medium 50357 X    OT Eval High 64543     OT Re-Eval [id number]     Ther Ex  87358       Manual Therapy 07207       Thera Activities 52734       ADL/Home Mgt 14372 15 1   Neuro Re-ed 25340       Group Therapy        Orthotic manage/training  93716       Non-Billable Time           JESSY Sinclair, OTR/L [id number]

## 2022-04-11 NOTE — PROGRESS NOTES
[facility] Progress Note    Date of Service: 4/11/2022    Reason for Follow-up: Narrow complex tachycardia, elevated troponin     SUBJECTIVE:     Patient is an 80year old WM known to Dr. Harlen Goldberg through inpatient consultation this admission. States he has followed with a cardiologist in [place], [place] in the past.   · Patient seen and examined in CVL this AM.   · He still admits to mild shortness of breath at rest, which has improved since hospital admission and supplemental oxygen use. Notes of mild peripheral edema bilaterally. · Denies chest pain, nausea, emesis, diaphoresis, palpitations, dizziness, near syncope, syncope, PND or orthopnea. · For GOVIND-guided DCCV today. HOME MEDICATIONS:  Prior to Admission medications    Medication Sig Start Date End Date Taking?  Authorizing Provider   guaiFENesin 200 MG tablet Take 400 mg by mouth every 4 hours as needed for Congestion   Yes Historical Provider, MD   loperamide (IMODIUM) 2 MG capsule Take 2 mg by mouth 2 times daily as needed for Diarrhea   Yes Historical Provider, MD   bisacodyl (DULCOLAX) 5 MG EC tablet Take 5 mg by mouth 2 times daily as needed for Constipation   Yes Historical Provider, MD   magnesium hydroxide (MILK OF MAGNESIA) 400 MG/5ML suspension Take 15 mLs by mouth daily   Yes Historical Provider, MD   Multiple Vitamins-Minerals (MULTIVITAMINS/MINERALS ADULT PO) Take 1 tablet by mouth daily   Yes Historical Provider, MD   Cholecalciferol 50 MCG (2000 UT) TABS Take 2,000 Units by mouth daily   Yes Historical Provider, MD   furosemide (LASIX) 20 MG tablet Take 20 mg by mouth 2 times daily   Yes Historical Provider, MD   polyvinyl alcohol (LIQUIFILM TEARS) 1.4 % ophthalmic solution Place 1 drop into both eyes 4 times daily     Historical Provider, MD   aspirin 81 MG EC tablet Take 81 mg by mouth daily     Historical Provider, MD   niacin 500 MG extended release capsule Take 500 mg by mouth nightly    Historical Provider, MD tamsulosin (FLOMAX) 0.4 MG capsule Take 0.4 mg by mouth 2 times daily     Historical Provider, MD   atorvastatin (LIPITOR) 80 MG tablet Take 40 mg by mouth daily     Historical Provider, MD   finasteride (PROSCAR) 5 MG tablet Take 5 mg by mouth daily  9/3/15   Historical Provider, MD   metoprolol (LOPRESSOR) 25 MG tablet Take 1 tablet by mouth 2 times daily.  11/28/12   Bobbi Espitia MD       CURRENT MEDICATIONS:      Current Facility-Administered Medications:     metoprolol succinate (TOPROL XL) extended release tablet 25 mg, 25 mg, Oral, BID, [place], [place], 25 mg at 04/10/22 2000    perflutren lipid microspheres (DEFINITY) injection 1.65 mg, 1.5 mL, IntraVENous, ONCE PRN, Trina Boudreaux    enoxaparin (LOVENOX) injection 80 mg, 1 mg/kg (Adjusted), SubCUTAneous, BID, [place], [place], 80 mg at 04/10/22 2002    aspirin chewable tablet 81 mg, 81 mg, Oral, Daily, Alban Hashimoto, DO, 81 mg at 04/10/22 7547    atorvastatin (LIPITOR) tablet 40 mg, 40 mg, Oral, Daily, Raciel Hashimoto, DO, 40 mg at 04/10/22 2120    finasteride (PROSCAR) tablet 5 mg, 5 mg, Oral, Daily, Raciel Hashimoto, DO, 5 mg at 04/10/22 1022    niacin extended release capsule 500 mg, 500 mg, Oral, Nightly, Alban Hashimoto, DO, 500 mg at 04/10/22 2001    tamsulosin [facility]) capsule 0.4 mg, 0.4 mg, Oral, Daily, Raciel Hashimoto, DO, 0.4 mg at 04/10/22 0902    sodium chloride flush 0.9 % injection 5-40 mL, 5-40 mL, IntraVENous, 2 times per day, Racielan Hashimoto, DO, 10 mL at 04/10/22 2004    sodium chloride flush 0.9 % injection 5-40 mL, 5-40 mL, IntraVENous, PRN, Alban Hashimoto, DO    0.9 % sodium chloride infusion, , IntraVENous, PRN, Alban Hashimoto, DO    polyethylene glycol (GLYCOLAX) packet 17 g, 17 g, Oral, Daily PRN, Alban Hashimoto, DO    acetaminophen (TYLENOL) tablet 650 mg, 650 mg, Oral, Q6H PRN **OR** acetaminophen (TYLENOL) suppository 650 mg, 650 mg, Rectal, Q6H PRN, Alban Hashimoto, DO    cefepime (MAXIPIME) 2000 mg IVPB minibag, 2,000 mg, IntraVENous, Q12H, Iliana Choi DO, Stopped at 04/11/22 0048    doxycycline (VIBRAMYCIN) 100 mg in dextrose 5 % 100 mL IVPB, 100 mg, IntraVENous, Q12H, Iliana Choi DO, Stopped at 04/10/22 2123    furosemide (LASIX) injection 20 mg, 20 mg, IntraVENous, Daily, Cornelius España MD, 20 mg at 04/10/22 0910    insulin lispro (HUMALOG) injection vial 0-6 Units, 0-6 Units, SubCUTAneous, TID WC, Cornelius España MD    insulin lispro (HUMALOG) injection vial 0-3 Units, 0-3 Units, SubCUTAneous, Nightly, Cornelius España MD, 2 Units at 04/10/22 2001    glucose (GLUTOSE) 40 % oral gel 15 g, 15 g, Oral, PRN, Cornelius España MD    dextrose 50 % IV solution, 12.5 g, IntraVENous, PRN, Cornelius España MD    glucagon (rDNA) injection 1 mg, 1 mg, IntraMUSCular, PRN, Cornelius España MD    dextrose 5 % solution, 100 mL/hr, IntraVENous, PRN, Cornelius España MD      ALLERGIES:  Patient has no known allergies. REVIEW OF SYSTEMS:     Negative except as noted above in HPI. PHYSICAL EXAM:   /73   Pulse 118   Temp 97.3 °F (36.3 °C) (Temporal)   Resp 16   Ht 6' 1\" (1.854 m)   Wt 190 lb 9.6 oz (86.5 kg)   SpO2 97%   BMI 25.15 kg/m²   CONST:  Well developed, well nourished elderly WM who appears stated age. Awake, alert, cooperative, no apparent distress. HEENT:   Head- Normocephalic, atraumatic. Eyes- Conjunctivae pink, anicteric. Neck-  No stridor, trachea midline, +jugular venous distention. CHEST: Chest symmetrical and non-tender to palpation. No accessory muscle use or intercostal retractions. RESPIRATORY: Lung sounds - rare rales at bases bilaterally (R>L). CARDIOVASCULAR:     No noted carotid bruit. Heart Ausculation- Regular rhythm with fast rate, questionable systolic murmur (however tachycardic during exam). PV: 1+ bilateral lower extremity edema. Pedal pulses palpable, no clubbing or cyanosis. ABDOMEN: Soft, non-tender to light palpation. Bowel sounds present. MS: Good muscle strength and tone. No atrophy or abnormal movements. SKIN: Warm and dry. NEURO / PSYCH: Oriented to person, place and time. Speech clear and appropriate. Follows all commands. Pleasant affect. DATA:    Telemetry: Possible AFL (narrow complex tachycardia) with HR in the 110s-120s    Diagnostic:  All diagnostic testing and lab work thus far this admission reviewed in detail. CXR 4/8/2022:  Impression:  Suspect early pulmonary vascular congestion.  Superimposed early bibasilar  pneumonia cannot be excluded. CT Head 4/8/2022:  Impression:  No acute intracranial abnormality or hemorrhage. Cortical atrophy and periventricular leukomalacia. Redemonstration of small right frontal subdural hygroma, with no adjacent  mass effect. Chest CTA 4/8/2022:  Impression:  No convincing evidence of pulmonary embolism although the distal segmental  branches of the posterior lower lobes are not optimally evaluated. Right upper and right middle lobe pulmonary nodules. Eliane Harbour may be on an  inflammatory/infectious basis although primary/metastatic neoplastic disease  cannot be excluded.  Follow-up noncontrast chest CT could be performed in 3  months.  Alternatively, the larger nodule could be evaluated with PET-CT. Right middle lobe, lingular and bilateral lower lobe infiltrates, likely on  an inflammatory/infectious basis.  There is bilateral posterior lower lobe  bronchiectasis and peribronchial thickening. Mediastinal and hilar lymphadenopathy.  This may be reactive or neoplastic. This is slightly greater than demonstrated previously although distribution  is similar. Cardiomegaly and atherosclerosis. Subtle hepatic changes suggest cirrhosis.  The spleen is borderline enlarged.     Pelvis XRAY 4/8/2022:  Impression:  No fracture or dislocation.  Diffuse osteopenia.  Osteo-degenerative changes  involving the visualized lower lumbar spine.  Lumbar scoliosis, convexity to  the right.      Intake/Output Summary (Last 24 hours) at 4/11/2022 0827  Last data filed at 4/11/2022 0541  Gross per 24 hour   Intake 840 ml   Output 775 ml   Net 65 ml       Labs:   CBC:   Recent Labs     04/09/22  1812 04/11/22  0736   WBC 17.7* 15.2*   HGB 14.0 12.6   HCT 45.2 39.4   * 110*     BMP:   Recent Labs     04/10/22  0545 04/11/22  0602    137   K 4.0 3.8   CO2 24 25   BUN 28* 30*   CREATININE 1.3* 1.2   LABGLOM 52 57   CALCIUM 9.3 9.0     Mag:   Recent Labs     04/10/22  0545 04/11/22  0602   MG 1.8 1.8     TSH:   Recent Labs     04/10/22  0445   TSH 1.100     HgA1c:   Lab Results   Component Value Date    LABA1C 7.2 07/06/2021     FASTING LIPID PANEL:  Lab Results   Component Value Date    CHOL 93 04/09/2022    HDL 33 04/09/2022    LDLCALC 44 04/09/2022    TRIG 81 04/09/2022     LIVER PROFILE:  Recent Labs     04/08/22  1604   AST 19   ALT 25   LABALBU 3.6     Component Ref Range & Units 04/08/22 1604   Pro-BNP 0 - 450 pg/mL 3,513 High           Ref Range & Units 04/11/22 0602   Pro-BNP 0 - 450 pg/mL 4,371 High        Ref Range & Units 04/08/22 2016 04/08/22 1604   Troponin, High Sensitivity 0 - 11 ng/L 43 High   47 High  CM       Ref Range & Units 04/08/22 1605   Procalcitonin 0.00 - 0.08 ng/mL 0.09 High       Component 4/8/22 1827   Organism Proteus mirabilis Abnormal     Urine Culture, Routine >100,000 CFU/ml      04/08/22 1827     Color, UA Straw/Yellow Yellow    Clarity, UA Clear SL CLOUDY    Glucose, Ur Negative mg/dL Negative    Bilirubin Urine Negative Negative    Ketones, Urine Negative mg/dL Negative    Specific Gravity, UA 1.005 - 1.030 1.010    Blood, Urine Negative SMALL Abnormal     pH, UA 5.0 - 9.0 6.0    Protein, UA Negative mg/dL Negative    Urobilinogen, Urine <2.0 E.U./dL 0.2    Nitrite, Urine Negative POSITIVE Abnormal     Leukocyte Esterase, Urine Negative LARGE Abnormal     WBC, UA 0 - 5 /HPF PACKED Abnormal     RBC, UA 0 - 2 /HPF 2-5    Bacteria, UA None Seen /HPF MODERATE Abnormal       Component 4/8/22 2016   Culture, Blood 2 24 Hours no growth P      Component 4/8/22 2000   Blood Culture, Routine 24 Hours no growth P       Ref Range & Units 04/08/22 2016   SARS-CoV-2, NAAT Not Detected Not Detected      Component 4/9/22 0213   L. pneumophila Serogp 1 Ur Ag Presumptive Negative -suggesting no recent or current infections   with Legionella pneumophila serogroup 1. Component 4/9/22 0213   STREP PNEUMONIAE ANTIGEN, URINE Presumptive negative- suggests no current or recent   pneumococcal infection. ASSESSMENT:  · Narrow complex tachycardia, likely atrial flutter with RVR. HR in the 110s-120s today. · Onset unknown. CHADsVASc score at least 4 (HTN, age, CAD). · On full dose Lovenox. · Scheduled for GOVIND-guided DCCV today. · Elevated troponin, pattern not consistent with ACS.    · Likely secondary to demand ischemia given tachycardia and LAMAR/CKD.  Nonspecific ST-T wave changes on EKG.    · CAD s/p reported CABG x 3 approximately 20 years ago. [facility] records available or recent ischemic evaluation per patient. · Acute CHF with unknown ejection fraction. Hypervolemic on exam.  proBNP 4300. · Acute hypoxic respiratory failure, on 2L NC. · Right-sided pneumonia, on antibiotics. · UTI. +UC for Proteus. Leukocytosis. · Pulmonary nodules noted on CTA.  Pulmonology consulted. · LAMAR on CKD, improved (Cr 1.5 --> 1.3 --> 1.2 today). · Possible liver cirrhosis on CT. Denies alcohol history. 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 · HTN, controlled. · HLD, on statin therapy. · Previous tobacco abuse. · Chronic thrombocytopenia. Stable. · T2DM, with peripheral neuropathy. · Clinical deconditioning. Wheelchair bound. Palliative care following -- full code.          RECOMMENDATIONS:  · Attempt to obtain records from [facility].   · Morning labs reviewed.    · Cancel TTE.  GOVIND today read as possible LILY thrombus --> no DCCV performed. Will schedule for repeat tomorrow with contrast +/- DCCV. · Lasix 40 mg IV BID. Strict I/Os, daily weights. Monitor renal function and electrolytes. · Continue full dose Lovenox with eventual transition to [address] [place] [address] ([facility]) and discontinuation of ASA at that time. · Continue other cardiac medications the same. · Liver US for further evaluation of possible cirrhosis on CT scan. · Neurosurgery consultation for evaluation of hygroma on Head CT. · Further recommendations to follow as per Dr. Gisela Álvarez. The above case and recommendations have been discussed and made in collaboration with Dr. Gisela Álvarez. NOTE: This report was transcribed using voice recognition software. Every effort was made to ensure accuracy; however, inadvertent computerized transcription errors may be present. Hilda Browning, [address] [place] [address] Cardiology    Electronically signed by Hubert Rousseau PA-C on 4/11/2022 at 8:27 AM   PHYSICIAN ADDENDUM:  I independently reviewed the above documentation and made amendments as needed. I formulated the assessment and plan with the JERAD with my contribution being >50%.  Plan discussed with the patient/family/care team.      Shilo Cooper MD, [facility] [place]  Interventional Cardiology/Structural Heart Disease  Office: [phone]  Coordinator: Jovana Schulte
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
## 2022-04-11 NOTE — CARE COORDINATION
This CM called [address] [facility] (p) [phone] spoke with Elicia Velez whom will have admissions call this CM back once out of their morning meeting. Chart reviewed, pt admitted with SOB, CHF exacerbation, possible a-flutter; for GOVIND / DCCV today. This CM attempted to meet with pt at bedside however, off unit for procedure; will re-visit if time allows. Addendum [email or web address]: Received call back from [facility] MACARIO with [facility], for pt to return would need to transfer independently, can return with oxygen, can not with IVs, facility utilizes [facility] for SNF stays.

## 2022-04-11 NOTE — PROGRESS NOTES
Physician Progress Note      Jc Rebolledo  CSN #:                  [id number]  :                       1934  ADMIT DATE:       2022 3:23 PM  [address] [place] DATE:  RESPONDING  PROVIDER #:        Adán Tovar          QUERY TEXT:    Pt admitted with pneumonia and respiratory failure. Pt noted to have WBC 12.8   and 17.7 with tachycardia and tachypnea. If possible, please document in the   progress notes and discharge summary if you are evaluating and /or treating   any of the following: The medical record reflects the following:  Risk Factors: Pneumonia, Acute Respiratory failure  Clinical Indicators: LABS WBC 12.8, 17.7,  Lactic 1.4 procal 0.09; VS on admit   97.4, 125, 18, 106/70, 97%RA   ------, 126, 26, 124/76, 95% 4LNC; per H&P   Healthcare associated pneumonia, leukocytosis  Treatment: IV Antibiotics, Serial labs and Vs monitoring, continued inpatient   monitoring    Thank you  Lonnie BRISENON, RN, CCDS  Clinical Documentation Improvement  Options provided:  -- Sepsis, present on admission  -- Sepsis, present on admission, now resolved  -- Sepsis, not present on admission  -- pneumonia without Sepsis  -- Other - I will add my own diagnosis  -- Disagree - Not applicable / Not valid  -- Disagree - Clinically unable to determine / Unknown  -- Refer to Clinical Documentation Reviewer    PROVIDER RESPONSE TEXT:    This patient has sepsis which was present on admission.     Query created by: Anuel Ryan on 2483 64:45 PM      Electronically signed by:  Adán Tovar 2022 1:18 PM

## 2022-04-11 NOTE — PROCEDURES
: Compa Galeana MD     Procedure Date: 4/11/2022     PROCEDURE(S): Synchronized direct-current cardioversion. INDICATION(S): Atrial fibrillation. CLINICAL SUMMARY:  80 y.o. male with atrial fibrillation who presented for cardioversion. The risks and benefits of synchronized direct current cardioversion and moderate sedation were discussed with the patient today to include but not limited skin burn, stroke/CVA, allergic reaction,breathing problems that require a breathing tube, infection, embolus, arrhythmias, MI, external pacing, temporary and or permanent pacemaker use of ACLS, death. It was discussed that there is a <1% risk of significant complication associated with the synchronized direct current cardioversion and moderate sedation. The patient verbalizes the  Understanding of these and other unnamed risks and wishes to proceed. The patient is on anticoagulationand status of anticoagulant agent was confirmed. DESCRIPTION OF PROCEDURE(S):  After taking written informed consent, the patient was brought to the procedure room and the pads were placed appropriately. NPO status was confirmed with the patient. A proper time-out was performed. The patient was sedated by the anesthesia service. GOVIND was performed which revealed suspected left atrial appendage clot and subsequently cardioversion was not performed. PLAN:  GOVIND was performed which revealed suspected left atrial appendage clot and subsequently cardioversion was not performed.           Misa Hernandez MD  4/11/2022  3:14 PM

## 2022-04-11 NOTE — PROGRESS NOTES
Hospitalist Progress Note      PCP: Stacia Crigler, MD    Date of Admission: 4/8/2022    Hospital Course:     \"30II M who presents with of shortness of breath.  He was sent from the [place] with concerns of heart failure. Vital signs reveal the patient to be hypoxic with an SPO2 of 88% on room air. Heart rate of 127.  EKG shows sinus tachycardia although this could be atrial flutter.  Patient was given a dose of digoxin as well as Cardizem in the emergency department without much effect.  Laboratory studies demonstrate BUN 26, creatinine 1.4, glucose 213, proBNP 3513, troponin of 43, WBC 12.8.  CT chest shows right upper and right middle lobe pulmonary nodules.  Right middle lobe lingular and bilateral lower lobe infiltrates.  Urinalysis positive for infection.  Patient was given Lasix and started on doxycycline and cefepime. \"    Subjective:    He has malaise. He has some dyspnea. No chest pain.     Medications:  Reviewed    Infusion Medications    sodium chloride      dextrose       Scheduled Medications    furosemide  40 mg IntraVENous BID    metoprolol succinate  25 mg Oral BID    enoxaparin  1 mg/kg (Adjusted) SubCUTAneous BID    aspirin  81 mg Oral Daily    atorvastatin  40 mg Oral Daily    finasteride  5 mg Oral Daily    niacin  500 mg Oral Nightly    tamsulosin  0.4 mg Oral Daily    sodium chloride flush  5-40 mL IntraVENous 2 times per day    cefepime  2,000 mg IntraVENous Q12H    doxycycline (VIBRAMYCIN) IV  100 mg IntraVENous Q12H    insulin lispro  0-6 Units SubCUTAneous TID WC    insulin lispro  0-3 Units SubCUTAneous Nightly     PRN Meds: perflutren lipid microspheres, sodium chloride flush, sodium chloride, polyethylene glycol, acetaminophen **OR** acetaminophen, glucose, dextrose, glucagon (rDNA), dextrose      Intake/Output Summary (Last 24 hours) at 4/11/2022 1335  Last data filed at 4/11/2022 1226  Gross per 24 hour   Intake 650 ml   Output 625 ml   Net 25 ml       Physical Exam Performed:    BP (!) 96/59   Pulse 123   Temp 97.9 °F (36.6 °C)   Resp 15   Ht 6' 1\" (1.854 m)   Wt 190 lb 9.6 oz (86.5 kg)   SpO2 93%   BMI 25.15 kg/m²     General appearance: No apparent distress, appears stated age and cooperative. On nasal 02  HEENT: Pupils equal, round, and reactive to light. Conjunctivae/corneas clear. Neck: Supple, with full range of motion. No jugular venous distention. Trachea midline. Respiratory:  Normal respiratory effort. bibasilar Rhonchi. Cardiovascular: Regular rate and irregular   rhythm with normal S1/S2 without murmurs, rubs or gallops. Abdomen: Soft, non-tender, non-distended with normal bowel sounds. Musculoskeletal: No clubbing, cyanosis. +1edema bilaterally. Full range of motion without deformity. Skin: Skin color, texture, turgor normal.  No rashes or lesions. Neurologic:  grossly non-focal.  Psychiatric: Alert and oriented, thought content appropriate, normal insight      Labs:   Recent Labs     04/08/22  1604 04/09/22  1812 04/11/22  0736   WBC 12.8* 17.7* 15.2*   HGB 13.6 14.0 12.6   HCT 43.6 45.2 39.4   * 121* 110*     Recent Labs     04/09/22  1812 04/10/22  0545 04/11/22  0602    144 137   K 4.9 4.0 3.8    105 103   CO2 20* 24 25   BUN 27* 28* 30*   CREATININE 1.5* 1.3* 1.2   CALCIUM 9.4 9.3 9.0     Recent Labs     04/08/22  1604   AST 19   ALT 25   BILITOT 0.7   ALKPHOS 116     No results for input(s): INR in the last 72 hours. No results for input(s): Rhina Cinthia in the last 72 hours. Urinalysis:      Lab Results   Component Value Date    NITRU POSITIVE 04/08/2022    WBCUA PACKED 04/08/2022    BACTERIA MODERATE 04/08/2022    RBCUA 2-5 04/08/2022    RBCUA 10-20 10/10/2012    BLOODU SMALL 04/08/2022    SPECGRAV 1.010 04/08/2022    GLUCOSEU Negative 04/08/2022       Radiology:  XR HIP 2-3 VW W PELVIS RIGHT   Final Result   No fracture or dislocation. Diffuse osteopenia.   Osteo-degenerative changes   involving the visualized lower lumbar spine. Lumbar scoliosis, convexity to   the right. CT HEAD WO CONTRAST   Final Result   No acute intracranial abnormality or hemorrhage. Cortical atrophy and periventricular leukomalacia. Redemonstration of small right frontal subdural hygroma, with no adjacent   mass effect. CTA CHEST W CONTRAST   Final Result   No convincing evidence of pulmonary embolism although the distal segmental   branches of the posterior lower lobes are not optimally evaluated. Right upper and right middle lobe pulmonary nodules. These may be on an   inflammatory/infectious basis although primary/metastatic neoplastic disease   cannot be excluded. Follow-up noncontrast chest CT could be performed in 3   months. Alternatively, the larger nodule could be evaluated with PET-CT. Right middle lobe, lingular and bilateral lower lobe infiltrates, likely on   an inflammatory/infectious basis. There is bilateral posterior lower lobe   bronchiectasis and peribronchial thickening. Mediastinal and hilar lymphadenopathy. This may be reactive or neoplastic. This is slightly greater than demonstrated previously although distribution   is similar. Cardiomegaly and atherosclerosis. Subtle hepatic changes suggest cirrhosis. The spleen is borderline enlarged. XR CHEST PORTABLE   Final Result   Suspect early pulmonary vascular congestion. Superimposed early bibasilar   pneumonia cannot be excluded. US LIVER    (Results Pending)           Assessment/Plan:    Active Hospital Problems    Diagnosis     Heart failure ([address] [I50.9]      Acute respiratory failure with hypoxia  CTA chest negative for PE and right sided PNA  -Currently on 2L NC. Wean as tolerated    HCAP  -Cont doxycycline and cefepime  -Cultures pending.   Follow-up MRSA screen  -WBC trending down    Mediastinal and hilar LAD  -Consult pulmonology for lymphadenopathy and pulmonary nodule    Chronic CHF EF unknown  -No CHF on CT chest.D/C lasix. FU Echo. Possible A-Flutter with RVR  -Pt received Digoxin, IV Cardizem in ED. Not rate controlled  -Consulted Cardiology. May consider GOVIND/DCV pending Echo results.     Proteus mirabilis UTI  -Cont cefepime     CAD-likely demand ischemia  -Elevated troponin  -Continue ASA,statin and B blocker    Diabetes Mellitus  -A1C 7.2 in 2021  -SSI     History of CLL   -Outpatient follow up with Oncology    CKD stage 3  -Cr stable     Urinary retention  -Cont Proscar and Flomax     PTOT  -Palliative care. Pt is full code at this time.     DVT Prophylaxis:therapeutic lovenox  Diet: Diet NPO Exceptions are: Sips of Water with Meds  Code Status: Full Code    PT/OT Eval Status: as needed    Dispo -Return to assisted living in 2 to 3 days    Pamela Jimenez MD

## 2022-04-11 NOTE — PROGRESS NOTES
Physical Therapy      Name: Claudeen Juniper  : 1934  MRN: [id number]  Date of Service: 2022  Room #:  9548/6284-K    PT order received. PT attempted - per nursing, patient off floor for procedure. PT will follow up as appropriate.     Valeria Asencio, PT, DPT  [id number] Subjective    Chief Complaint   Patient presents with   • Follow-up     IUD check from u/s      History of Present Illness    Caroline Conley is a 27 y.o. female who presents for IUD check.  Ultrasound today shows IUD to be in place.  Previous string not observable.  Patient also complaining of the last 4 months since having COVID of urinary frequency and stress incontinence which is significant.  She was seen in the ER and had a UA which did show a lot of red cells.  She has not had a recent hemoglobin A1c.  Patient is very distressed that the amount of urine she is losing.    Obstetric History:  OB History        1    Para   1    Term   1            AB        Living   1       SAB        IAB        Ectopic        Molar        Multiple   0    Live Births   1               Menstrual History:     No LMP recorded. (Menstrual status: Other).       Past Medical History:   Diagnosis Date   • Abscess of axilla 3/7/2018   • Abscess of face 10/27/2020   • Acute gastritis 2020   • Alcohol abuse 2019    SEVERE DEPENDENCE, IN REMISSION   • Allergic rhinitis    • Anxiety 2019   • ASCUS with positive high risk HPV cervical 2010   • Asthma    • Atrial fibrillation (HCC) 2022   • Atypical chest pain 2019    SEEN AT [facility]   • Blood type A+    • Breast tenderness 2019   • Chest pain 2020   • Constipation 2020   • COVID-19 2021    SEEN AT [facility]   • Decreased libido 2021   • Depression    • DUB (dysfunctional uterine bleeding) 2020    SEEN AT [facility]   • Dysmenorrhea 2020   • Dyspareunia in female 2020   • Epigastric pain 2020    SEEN AT [facility]   • GERD (gastroesophageal reflux disease)    • Hair loss 2022   • Hematochezia 2020   • Hematuria 2021   • Herpes     last outbreak , no current outbrek, FOB does not know   • Hyperglycemia 2021   • Hyperlipidemia 2020   • Hypokalemia 2021   • Irregular heart rhythm  "1/28/2022   • Irregular menses 12/2019   • Leiomyoma 04/16/2019    SEEN AT [facility]   • Migraine    • Mononucleosis 07/2020   • Palpitations 08/08/2020    SEEN AT [facility]   • Panic attack    • Panic disorder (episodic paroxysmal anxiety) 2/11/2019   • Paresthesias 08/04/2021    SEEN AT [facility]   • Peripheral neuropathy 02/23/2021    SEEN AT [facility]   • PMS (premenstrual syndrome)    • Polyosteoarthritis    • Polysubstance abuse (HCC) 7/12/2019    USED METH, IN REMISSION   • Raynaud's disease 6/28/2021   • Rectal bleeding 11/2021   • Seasonal allergies    • Seizure-like activity (HCC) 04/18/2021    SEEN AT [facility]   • Sinus tachycardia 06/29/2019    SEEN AT [facility]   • Spells of decreased attentiveness 05/16/2021    ADMITTED TO [facility]   • Syncope and collapse 08/09/2020    ADMITTED TO [facility]   • Tachycardia 5/21/2021   • Vitamin B 12 deficiency    • Vitamin D deficiency      Family History   Problem Relation Age of Onset   • Thyroid disease Mother    • IVON disease Mother    • Alcohol abuse Mother    • Bipolar disorder Mother    • Mental illness Mother    • Alcohol abuse Father    • Alcohol abuse Maternal Grandmother    • Hypertension Maternal Grandmother    • Mental illness Maternal Grandmother    • Mental illness Maternal Aunt        The following portions of the patient's history were reviewed and updated as appropriate: allergies, current medications, past medical history, past surgical history and problem list.    Review of Systems  As per HPI       Objective   Physical Exam  Cervix way in the back of vagina and string not seen from IUD.  Vagina without lesion.  Mild loss of urethrovesical angle.  Uterus normal size and shape and adnexa negative.  /70   Ht 157.5 cm (62\")   Wt 83.9 kg (185 lb)   BMI 33.84 kg/m²     Assessment/Plan   Diagnoses and all orders for this visit:    1. SARAH (stress urinary incontinence, female) (Primary)  -     Urinalysis With Microscopic - Urine, Clean Catch  -     Urine " Culture - Urine, Urine, Clean Catch  -     CBC & Differential  -     Comprehensive Metabolic Panel  -     Hemoglobin A1C With EMG  -     Ambulatory Referral to Urology    2. Urinary frequency  -     Urinalysis With Microscopic - Urine, Clean Catch  -     Urine Culture - Urine, Urine, Clean Catch        Impression and plan.  Will recheck UA and urine culture and blood work but will also make referral to urology for evaluation of significant stress urinary incontinence.  Patient in total agreement with this plan.

## 2022-04-11 NOTE — PROGRESS NOTES
Family called to update them on procedure scheduled and new time. Patient's procedure time moved up ~ originally scheduled for 1:30. Spoke with Eloina Ricks and son La Oneill. All questions answered.
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
## 2022-04-11 NOTE — ANESTHESIA POSTPROCEDURE EVALUATION
Department of Anesthesiology  Postprocedure Note    Patient: Yamilex Garza  MRN: [id number]  YOB: 1934  Date of evaluation: 4/11/2022  Time:  2:06 PM     Procedure Summary     Date: 04/11/22 Room / Location: [facility] CATH LAB; [facility] ECHO    Anesthesia Start: 1152 Anesthesia Stop: 9307    Procedure: SEY GOVIND CARDIOVERSION W/ ANES Diagnosis:     Scheduled Providers: Paul Shepherd MD Responsible Provider: Paul Shepherd MD    Anesthesia Type: MAC ASA Status: 4          Anesthesia Type: MAC    Donna Phase I:      Donna Phase II:      Last vitals: Reviewed and per EMR flowsheets.        Anesthesia Post Evaluation    Patient location during evaluation: PACU  Patient participation: complete - patient participated  Level of consciousness: awake and alert  Airway patency: patent  Nausea & Vomiting: no nausea and no vomiting  Complications: no  Cardiovascular status: hemodynamically stable and blood pressure returned to baseline  Respiratory status: acceptable  Hydration status: euvolemic

## 2022-04-12 NOTE — PLAN OF CARE
Patient's chart updated to reflect:      . - HF care plan, HF education points and HF discharge instructions.  -Orders: 2 gram sodium diet, daily weights, I/O.  -PCP and cardiology follow up appointments to be scheduled within 7 days of hospital discharge. -CHF education session will be provided to the patient prior to hospital discharge.     Chilo Williamson RN RN, BSN  Heart Failure Navigator
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
## 2022-04-12 NOTE — PROGRESS NOTES
Occupational Therapy  OT BEDSIDE TREATMENT NOTE   [address] [place] [address] [address]  [facility], [address]       Date:2022  Patient Name: Som De Leon  MRN: [id number]  : 1934  Room: [address] [place] [address]     Per OT Eval:    Evaluating OT: Gabriela Armstrong, [address] [place], OTR/L [id number]  Referring Jey Diver, DO  Specific Provider Orders: OT eval and treat   Recommended Adaptive Equipment: 24 hr physical assist      Diagnosis: heart failure (s/p fall, no fxs found)   Surgery: none   Pertinent Medical History: CAD, CLL, HTN, HLD     Precautions:  Fall Risk, O2     Assessment of current deficits   [x]? Functional mobility           [x]? ADLs           [x]? Strength                  [x]? Cognition   [x]? Functional transfers         [x]? IADLs         [x]? Safety Awareness   [x]? Endurance   []? Fine Coordination                         [x]? Balance      []? Vision/perception   [x]? Sensation     []? Gross Motor Coordination             []? ROM           []?  Delirium                   []? Motor Control      OT PLAN OF CARE   OT POC based on physician orders, patient diagnosis and results of clinical assessment     Frequency/Duration: 2-3 days/wk for 2 weeks PRN   Specific OT Treatment to include:   * Instruction/training on adapted ADL techniques and AE recommendations to increase functional independence within precautions       * Training on energy conservation strategies, correct breathing pattern and techniques to improve independence/tolerance for self-care routine  * Functional transfer/mobility training/DME recommendations for increased independence, safety, and fall prevention  * Patient/Family education to increase follow through with safety techniques and functional independence  * Recommendation of environmental modifications for increased safety with functional transfers/mobility and ADLs  * Cognitive retraining/development of therapeutic activities to improve problem solving, judgement, memory, and attention for increased safety/participation in ADL/IADL tasks  * Therapeutic exercise to improve motor endurance, ROM, and functional strength for ADLs/functional transfers  * Therapeutic activities to facilitate/challenge dynamic balance, stand tolerance for increased safety and independence with ADLs  * Neuro-muscular re-education: facilitation of righting/equilibrium reactions, midline orientation, scapular stability/mobility, normalization of muscle tone, and facilitation of volitional active controled movement     Home Living: Pt presents from the [place]. He reports using electric w/c and SPTs.      Pain Level: 0/10  Cognition: A&O: 3/4; Follows 1-2 step directions, pleasant & cooperative, very motivated              Memory:  fair+              Sequencing:  fair+             Problem solving:  fair+             Judgement/safety:  fair+     Functional Assessment:  AM-PAC Daily Activity Raw Score: 15/24    Initial Eval Status  Date: 4/10/22 Treatment Status  Date:  4/12/22 STGs=LTGs  Time Frame: 10-14 days   Feeding SBA   Mod Indep  Up in chair for lunch  SUP   Grooming SBA (seated for facial washing)   Set up  To wash off face, jose glasses, dentures & watch    SUP   UB Dressing Mod A (due to limited ROM)   Min A  With gown seated at EOB, assist around back due to limited ROM  Min A   LB Dressing Max A (assist with socks)  Max A  simulated Mod A    Bathing Max A (simulated) Max A  simulated  Mod A    Toileting Max A  Max A  Using ext male catheter, mild BM noted after standing  Mod A   Bed Mobility  Log roll: Mod A  Supine to sit: Mod A   Sit to supine: Mod A   Mod A  Supine to sit  Log roll CGA  Supine to sit: CGA   Sit to supine: CGA   Functional Transfers Sit to stand: Mod A   Stand to sit:Mod A  Commode: NT (RN notified to order [facility] for rm) Mod A  Sit < > stand  Multiple times  From EOB & chair  CGA   Functional Mobility NT (pt too fearful)  Mod A  With walker, completed x 3  Slow but controlled   (pt only completes SPT at AL) Min A if appropriate (pt reported not completing prior and only completed SPTs)   Balance Sitting: SBA  Standing: Min A  Sitting: SBA  Standing: Min-Mod A  With & without walker     Activity Tolerance fair  Fair  97-96% on 3L O2     Visual/  Perceptual Glasses: yes                       Education:  Pt was educated through out treatment regarding proper technique & safety with bed mobility, functional transfers & mobility with walker short distance (SPT only) & ADL compensatory strategies to ease tasks, improve safety & prevent falls. Comments: Upon arrival pt was in bed & agreeable for therapy. At end of session pt was seated in chair, all lines and tubes intact, call light within reach. nsg aware    · Pt has made Fair progress towards set goals. · Continue with current plan of care      Treatment Time In: 1:45            Treatment Time Out: 2:15           Treatment Charges: Mins Units   Ther Ex  12048     Manual Therapy [phone]     Thera Activities 48997 15 1   ADL/Home Mgt 72310 15 1   Neuro Re-ed 18279     Group Therapy      Orthotic manage/training  32575     Non-Billable Time     Total Timed Treatment 30 2       Sanaz REID  [address] [place] [address] Drive, [address] [place] [address]

## 2022-04-12 NOTE — PROGRESS NOTES
[facility]  [address], [address]  [facility], [facility]      Pulmonary Progress Note      SUBJECTIVE:  Claims doing some better with breathing, still with ronchorous cough but (?) less sputum production. Still on 3 li NC, cardioverted today.     OBJECTIVE    Medications    Continuous Infusions:   amiodarone 450mg/250ml D5W infusion 1 mg/min (22 175)    Followed by   Rustam Buenrostro ON 2022] amiodarone 450mg/250ml D5W infusion      sodium chloride      dextrose         Scheduled Meds:   metoprolol succinate  50 mg Oral BID    [START ON 2022] amiodarone  400 mg Oral BID    Followed by   Rustam Buenrostro ON 2022] amiodarone  200 mg Oral Daily    furosemide  40 mg IntraVENous BID    ipratropium-albuterol  1 ampule Inhalation TID    enoxaparin  1 mg/kg (Adjusted) SubCUTAneous BID    aspirin  81 mg Oral Daily    atorvastatin  40 mg Oral Daily    finasteride  5 mg Oral Daily    niacin  500 mg Oral Nightly    tamsulosin  0.4 mg Oral Daily    sodium chloride flush  5-40 mL IntraVENous 2 times per day    cefepime  2,000 mg IntraVENous Q12H    doxycycline (VIBRAMYCIN) IV  100 mg IntraVENous Q12H    insulin lispro  0-6 Units SubCUTAneous TID WC    insulin lispro  0-3 Units SubCUTAneous Nightly       PRN Meds:perflutren lipid microspheres, sodium chloride flush, sodium chloride, polyethylene glycol, acetaminophen **OR** acetaminophen, glucose, dextrose, glucagon (rDNA), dextrose    Physical    VITALS:  BP (!) 113/59   Pulse 104   Temp 97.5 °F (36.4 °C)   Resp 18   Ht 6' 1\" (1.854 m)   Wt 189 lb 8 oz (86 kg)   SpO2 98%   BMI 25.00 kg/m²     24HR INTAKE/OUTPUT:      Intake/Output Summary (Last 24 hours) at 2022 182  Last data filed at 2022 1452  Gross per 24 hour   Intake 220 ml   Output 1600 ml   Net -1380 ml       24HR PULSE OXIMETRY RANGE:    SpO2  Av %  Min: 88 %  Max: 98 %    General appearance: alert, appears stated age and cooperative  Lungs: rhonchi bilaterally with cough  Heart: regular rate and rhythm, S1, S2 normal, no murmur, click, rub or gallop  Abdomen: soft, non-tender; bowel sounds normal; no masses,  no organomegaly  Extremities: extremities normal, atraumatic, no cyanosis or edema  Neurologic: Mental status: Alert, oriented, thought content appropriate    Data    CBC:   Recent Labs     04/11/22  0736 04/12/22  0620   WBC 15.2* 14.5*   HGB 12.6 12.7   HCT 39.4 39.3   MCV 95.9 96.1   * 110*       BMP:  Recent Labs     04/10/22  0545 04/11/22  0602 04/12/22  0620    137 134   K 4.0 3.8 4.3    103 99   CO2 24 25 24   BUN 28* 30* 28*   CREATININE 1.3* 1.2 1.2    ALB:3,BILIDIR:3,BILITOT:3,ALKPHOS:3)@    PT/INR: No results for input(s): PROTIME, INR in the last 72 hours. ABG:   No results for input(s): PH, PO2, PCO2, HCO3, BE, O2SAT, METHB, O2HB, COHB, O2CON, HHB, THB in the last 72 hours. Radiology/Other tests reviewed: none    Assessment:     Principal Problem:    Heart failure ([address]  Resolved Problems:    * No resolved hospital problems. *  bronchiectasis      Plan:       1. Cont with nebs and incentive spirometer/flutter device  2. Cont with oxygen, taper as tolerated  3. Watch fluid balance, diurese as per Cardiology  4. Repeat CT chest in 2-4 weeks to ffup on lymphadenopathy      Time at the bedside, reviewing labs and radiographs, reviewing notes and consultations, discussing with staff and family was more than 35 minutes. Thanks for letting us see this patient in consultation. Please contact us with any questions. Office [phone] or after hours through [employer], x [phone].
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
## 2022-04-12 NOTE — CARE COORDINATION
Met with pt at bedside, discussed [facility] criteria to return (stand pivot independently, no iv medication), pt requested this CM update his cousin Tevin Chris ([address] wife), this CM provided update via phone conversation. Discharge plan is [facility] vs [facility] pending the aforementioned criteria. This CM is to call Tevin Perez and update once physical therapy sees.

## 2022-04-12 NOTE — PROCEDURES
TRANSESOPHAGEAL ECHOCARDIOGRAPHY / CARDIOVERSION    CARDIOLOGIST: Dr. Reta Gusman: GOVIND and Cardioversion    DATE OF PROCEDURE: 4/12/2022    HISTORY: Symptomatic atrial fibrillation/flutter, assess for LA thrombus    GOVIND findings:   No left atrial appendage thrombus / Definity contrast used    TECHNIQUE: Risks, benefits and alternatives to GOVIND guided DC cardioversion were explained to the patient at length, and the patient acknowledged understanding. The patient was in a stable fasting condition. Cardiac rhythm, arterial oxygen saturation and blood pressure were continuously monitored. The patient was sedated by the Department of Anesthesiology.     RESULTS:  Patch/Paddle Position: Anterior/posterior  Energy (Joules): 200  Initial Rhythm: Atrial fibrillation/flutter  Outcome Rhythm: Sinus rhythm, rate 06'Z  COMPLICATIONS: None  CONCLUSION:  Successful electrical cardioversion of atrial fibrillation/flutter      Candelaria Puente MD  [facility]) Cardiology

## 2022-04-12 NOTE — PROGRESS NOTES
This nurse notified by telemetry that patient had 53 beats of V-tach. Strip not received but reviewed telemetry history on monitor and noted periods of intermittent v-tach, wide QRS for multiple beats and  Patient denies any chest pain or shortness of breath. /71, pulse ox 92% on room air. This staff unable to perfect serve. Notified Kenneyert Loud, charge nurse. Dr. Kelly Franklin responded \"ok, ty\". Dr. David Cantu read and no response at this time.

## 2022-04-12 NOTE — PLAN OF CARE
Problem: Skin Integrity:  Goal: Will show no infection signs and symptoms  Description: Will show no infection signs and symptoms  Outcome: Ongoing  Goal: Absence of new skin breakdown  Description: Absence of new skin breakdown  Outcome: Ongoing     Problem: Falls - Risk of:  Goal: Will remain free from falls  Description: Will remain free from falls  Outcome: Ongoing  Goal: Absence of physical injury  Description: Absence of physical injury  Outcome: Ongoing     Problem: Musculor/Skeletal Functional Status  Goal: Highest potential functional level  Outcome: Ongoing  Goal: Absence of falls  Outcome: Ongoing     Problem: OXYGENATION/RESPIRATORY FUNCTION  Goal: Patient will maintain patent airway  Outcome: Ongoing     Problem: HEMODYNAMIC STATUS  Goal: Patient has stable vital signs and fluid balance  Outcome: Ongoing
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
## 2022-04-12 NOTE — PROGRESS NOTES
Physical Therapy  Physical Therapy Initial Assessment     Name: Cirilo Eason  : 1934  MRN: [id number]      Date of Service: 2022    Evaluating PT:  Joselito Oswald PT, DPT [id number]    Room #:  3009/6172-Z  Diagnosis:  Tachycardia [R00.0]  Heart failure ([facility] [I50.9]  Acute cystitis with hematuria [N30.01]  Pneumonia due to infectious organism, unspecified laterality, unspecified part of lung [J18.9]  Congestive heart failure, unspecified HF chronicity, unspecified heart failure type ([facility] [I50.9]  PMHx/PSHx:  Leukemia, HLD, HTN, CAD, CABG, cervical fusion  Procedure/Surgery:  GOVIND (2022); GOVIND and Cardioversion (2022)  Precautions:  Falls, external catheter, O2  Equipment Needs:  TBD  Equipment Owned: Manual wheelchair, electric wheelchair    SUBJECTIVE:    Pt from nursing facility. Pt non-ambulatory PTA; used manual wheelchair or electric wheelchair for mobility; performed stand pivot transfers to/from wheelchair independently. OBJECTIVE:   Initial Evaluation  Date: 2022 Treatment Short Term/ Long Term   Goals   AM-PAC 6 Clicks 69/80     Was pt agreeable to Eval/treatment? Yes     Does pt have pain? No c/o pain     Bed Mobility  Rolling: NT  Supine to sit: ModA  Sit to supine: NT  Scooting: NT  Rolling: Independent  Supine to sit: Independent  Sit to supine: Independent  Scooting: Independent   Transfers Sit to stand: ModA  Stand to sit: ModA  Stand pivot: ModA with no AD;  ModA with [employer]  Sit to stand: Ryan  Stand to sit: Ryan  Stand pivot: Ryan with AAD   Ambulation    3 feet with no AD ModA; 3 feet with [employer] ModA  3 feet with AAD Ryan   Wheelchair mobility  NT  50 feet using BUE Independent   ROM BUE:  See OT note  BLE:  WFL     Strength BUE:  See OT note  BLE:  Grossly 4+/5     Balance Sitting EOB:  SBA  Dynamic Standing:  ModA with [employer]  Sitting EOB:  Independent  Dynamic Standing:  Ryan with AAD     Pt is A & O x 4  Sensation:  No reports of numbness/tingling to extremities  Edema: Unremarkable    Vitals:   HR 98, SpO2 97% (3 L O2) at rest.  HR 64, SpO2 96% (3 L O2) following activity. Patient education  Pt educated on role of PT, safety during functional mobility, use of call light for assistance. Patient response to education:   Pt verbalized understanding Pt demonstrated skill Pt requires further education in this area   Yes Yes Yes     ASSESSMENT:    Conditions Requiring Skilled Therapeutic Intervention:    [x]Decreased strength     []Decreased ROM  [x]Decreased functional mobility  [x]Decreased balance   [x]Decreased endurance   [x]Decreased posture  []Decreased sensation  []Decreased coordination   []Decreased vision  [x]Decreased safety awareness   []Increased pain       Comments:  Session cleared by nursing. Patient in semi-Reece's position upon arrival; agreeable to PT evaluation with OT collaboration. Required increased time, assistance of trunk and BLE to perform bed mobility. Tolerated sitting EOB for extended period of time. Performed several stand pivot transfers; required increased time, verbal cues related to positioning and sequencing to ensure safety during such. Performed stand pivot transfers with decreased speed and unsteadiness. Patient incontinent of stool during session. Tolerated static standing using [employer] for BUE support for extended period of time while hygiene was performed. Activity limited by fatigue. Vitals monitored and noted. Patient left sitting in bedside chair with call light in reach. Patient would benefit from continued skilled PT services to address functional deficits.     Treatment:  Patient practiced and was instructed in the following treatment:     Bed mobility - verbal cues to facilitate proper positioning; physical assistance provided during activity   Static sitting - performed to promote upright tolerance, postural awareness, and balance maintenance   Functional transfers - verbal cues to facilitate proper positioning and sequencing, particularly related to hand/foot placement; physical assistance provided during activity    Pt's/ family goals   1. Return home    Prognosis is good for reaching above PT goals. Patient and or family understand(s) diagnosis, prognosis, and plan of care. Yes    PHYSICAL THERAPY PLAN OF CARE:    PT POC is established based on physician order and patient diagnosis     Referring provider/PT Order:    04/09/22 0145  PT eval and treat  Start:  04/09/22 0145,   End:  04/09/22 0145,   ONE TIME,   Standing Count:  1 Occurrences,   R         Docarter Calhoun, DO       Diagnosis:  Tachycardia [R00.0]  Heart failure ([facility] [I50.9]  Acute cystitis with hematuria [N30.01]  Pneumonia due to infectious organism, unspecified laterality, unspecified part of lung [J18.9]  Congestive heart failure, unspecified HF chronicity, unspecified heart failure type ([facility] [I50.9]  Specific instructions for next treatment:  Continue to facilitate safe performance of functional mobility; progress as appropriate. Current Treatment Recommendations:     [x] Strengthening to improve independence with functional mobility   [] ROM to improve independence with functional mobility   [x] Balance Training to improve static/dynamic balance and to reduce fall risk  [x] Endurance Training to improve activity tolerance during functional mobility   [x] Transfer Training to improve safety and independence with all functional transfers   [] Gait Training to improve gait mechanics, endurance and assess need for appropriate assistive device  [] Stair Training in preparation for safe discharge home and/or into the community   [x] Positioning to prevent skin breakdown and contractures  [x] Safety and Education Training   [x] Patient/Caregiver Education   [] HEP  [] Other     PT long term treatment goals are located in above grid    Frequency of treatments: 2-5x/week x 1-2 weeks.     Time in  1345  Time out  1420    Total Treatment Time  25 minutes     Evaluation Time includes thorough review of current medical information, gathering information on past medical history/social history and prior level of function, completion of standardized testing/informal observation of tasks, assessment of data and education on plan of care and goals.     CPT codes:  [x] Low Complexity PT evaluation 31972  [] Moderate Complexity PT evaluation 17944  [] High Complexity PT evaluation 98558  [] PT Re-evaluation 67460  [] Gait training 38371 0 minutes  [] Manual therapy 02653 0 minutes  [x] Therapeutic activities 38942 25 minutes  [] Therapeutic exercises 56674 0 minutes  [] Neuromuscular reeducation 05691 0 minutes     Valeria Asencio, PT, DPT  [id number]

## 2022-04-12 NOTE — PROGRESS NOTES
Output 1000 ml   Net -900 ml       Physical Exam Performed:    /70   Pulse 91   Temp 97.2 °F (36.2 °C)   Resp 19   Ht 6' 1\" (1.854 m)   Wt 189 lb 8 oz (86 kg)   SpO2 98%   BMI 25.00 kg/m²     General appearance: No apparent distress, appears stated age and cooperative. On nasal 02  HEENT: Pupils equal, round, and reactive to light. Conjunctivae/corneas clear. Neck: Supple, with full range of motion. No jugular venous distention. Trachea midline. Respiratory:  Normal respiratory effort. bibasilar Rhonchi. Cardiovascular: Regular rate and irregular   rhythm with normal S1/S2 without murmurs, rubs or gallops. Abdomen: Soft, non-tender, non-distended with normal bowel sounds. Musculoskeletal: No clubbing, cyanosis. +1edema bilaterally. Full range of motion without deformity. Skin: Skin color, texture, turgor normal.  No rashes or lesions. Neurologic:  grossly non-focal.  Psychiatric: Alert and oriented, thought content appropriate, normal insight      Labs:   Recent Labs     04/09/22  1812 04/11/22  0736 04/12/22  0620   WBC 17.7* 15.2* 14.5*   HGB 14.0 12.6 12.7   HCT 45.2 39.4 39.3   * 110* 110*     Recent Labs     04/10/22  0545 04/11/22  0602 04/12/22  0620    137 134   K 4.0 3.8 4.3    103 99   CO2 24 25 24   BUN 28* 30* 28*   CREATININE 1.3* 1.2 1.2   CALCIUM 9.3 9.0 9.0     No results for input(s): AST, ALT, BILIDIR, BILITOT, ALKPHOS in the last 72 hours. No results for input(s): INR in the last 72 hours. No results for input(s): Milvia Belts in the last 72 hours. Urinalysis:      Lab Results   Component Value Date    NITRU POSITIVE 04/08/2022    WBCUA PACKED 04/08/2022    BACTERIA MODERATE 04/08/2022    RBCUA 2-5 04/08/2022    RBCUA 10-20 10/10/2012    BLOODU SMALL 04/08/2022    SPECGRAV 1.010 04/08/2022    GLUCOSEU Negative 04/08/2022       Radiology:  US ABDOMEN LIMITED   Final Result   1.  Increased echogenicity liver, suggest steatosis         XR HIP 2-3 VW W PELVIS RIGHT   Final Result   No fracture or dislocation. Diffuse osteopenia. Osteo-degenerative changes   involving the visualized lower lumbar spine. Lumbar scoliosis, convexity to   the right. CT HEAD WO CONTRAST   Final Result   No acute intracranial abnormality or hemorrhage. Cortical atrophy and periventricular leukomalacia. Redemonstration of small right frontal subdural hygroma, with no adjacent   mass effect. CTA CHEST W CONTRAST   Final Result   No convincing evidence of pulmonary embolism although the distal segmental   branches of the posterior lower lobes are not optimally evaluated. Right upper and right middle lobe pulmonary nodules. These may be on an   inflammatory/infectious basis although primary/metastatic neoplastic disease   cannot be excluded. Follow-up noncontrast chest CT could be performed in 3   months. Alternatively, the larger nodule could be evaluated with PET-CT. Right middle lobe, lingular and bilateral lower lobe infiltrates, likely on   an inflammatory/infectious basis. There is bilateral posterior lower lobe   bronchiectasis and peribronchial thickening. Mediastinal and hilar lymphadenopathy. This may be reactive or neoplastic. This is slightly greater than demonstrated previously although distribution   is similar. Cardiomegaly and atherosclerosis. Subtle hepatic changes suggest cirrhosis. The spleen is borderline enlarged. XR CHEST PORTABLE   Final Result   Suspect early pulmonary vascular congestion. Superimposed early bibasilar   pneumonia cannot be excluded. Assessment/Plan:    Active Hospital Problems    Diagnosis     Heart failure ([facility] [address] [I50.9]      Acute respiratory failure with hypoxia  CTA chest negative for PE and right sided PNA  -Currently on 3L NC. Wean as tolerated. Not on oxygen at facility  -Pulmonology consulted.  Duonebs and incentive spirometry.  -Continue diuresis    HCAP  -Cont doxycycline and cefepime  -No growth thus far on blood cultures pending. Negative MRSA screen  -WBC elevated. Mediastinal and hilar LAD  -Consult pulmonology for lymphadenopathy and pulmonary nodule. Plan for bronchoscopy later. Chronic diastolic CHF EF 50 to 63%  -No CHF on CT chest.Continue lasix. Echo shows nl EF    Possible A-Flutter with RVR  -Pt received Digoxin, IV Cardizem in ED. Rate controlled  -Consulted Cardiology. Pt had clot on left atrial appendage on GOVIND on 4/12/22. Repeat GOVIND didn't show clot on appendage. He is s/p GOVIND/DCV pending Echo results.  -Neurosurgery evaluated patient due to hygromas. No treatment needed. Cardiology to determine oral anticoagulation.     Proteus mirabilis UTI  -Cont cefepime     CAD-likely demand ischemia  -Elevated troponin  -Continue ASA,statin and B blocker    Diabetes Mellitus  -A1C 7.2 in 2021  -SSI     History of CLL   -Outpatient follow up with Oncology    Thrombocytopenia  -Trending down monitor. CKD stage 3  -Cr stable     Urinary retention  -Cont Proscar and Flomax     PTOT  -Palliative care. Pt is full code at this time. DVT Prophylaxis:therapeutic lovenox  Diet: ADULT DIET; Regular;  Low Sodium (2 gm)  Code Status: Full Code    PT/OT Eval Status: as needed    Dispo -Plan for MELLY at his current Assisted living facility in 2 to 3 days    Gini Singh MD

## 2022-04-12 NOTE — PROGRESS NOTES
Charge HAKEEM hammer served Dr. Pema Bojorquez of heart and vascular for pt hr sustaining in 120's. CMR contacted pt RN and stated pt had intermittent vtach up to 53 beats? VSS pt denies any c/p or sob.      New orders received from Dr. Pema Bojorquez
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
## 2022-04-12 NOTE — PLAN OF CARE
Case discussed with Dr. Eliezer Gitelman. For repeat GOVIND today with contrast for re-evaluation of LILY +/- DCCV --> per review, no thrombus was noted on repeat GOVIND and patient underwent successful DCCV to NSR. IV to PO diuresis starting tomorrow -- continue strict intake and output, daily weights. Monitor renal function and electrolytes. Metoprolol titrated today. Continue other cardiac medications the same at this time. Check proBNP. Neurosurgery recommendations reviewed -- will request recommendations regarding initiation of Eliquis given hygroma on CT Head this admission. Will discontinue ASA if initiated on Eliquis. Liver US reviewed.       Del Goyal, [facility] [address] [facility]

## 2022-04-12 NOTE — CARE COORDINATION
Spoke with Rohini Davies regarding pt's therapy, discharge plan is to [facility]; HENS and LISETH started; destination, envelope, and ambulance form completed; notified Dolly Enamorado to start pre-cert, she'll initiate either this evening or first thing tomorrow morning.

## 2022-04-13 NOTE — CARE COORDINATION
Request received from [address] at [facility] for clinicals. This CM faxed clinicals to [facility] via ([phone].

## 2022-04-13 NOTE — PROGRESS NOTES
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 · Presented to hospital after fall at nursing home; found to be in atrial flutter and acute heart failure  · Underwent DCCV to NSR with amiodarone now on board  · Complains of \"hurting all over\"; denies shortness of breath or chest pain      Objective:  /58  P 78  R 16  T 97.3  Neck with (+) JVD  Lungs with diminished breath sounds with rhonchi bilaterally  Heart RRR with no M/G/R  Abdomen soft, non tender  Extremities with mild edema bilaterally  On 2L nasal canula with sats 88-98%  I/O not accurate  Labs and vitals stable    GOVIND 4/12/22: no LILY clot      Assessment:  · Narrow complex tachycardia, likely atrial flutter with RVR. HR in the 110s-120s today. ? Onset unknown. CHADsVASc score at least 4 (HTN, age, CAD). ? On full dose Lovenox. ? Maintaining NSR s/p GOVIND-guided DCCV   · Elevated troponin, pattern not consistent with ACS.    ? Likely secondary to demand ischemia given tachycardia and LAMAR/CKD.  Nonspecific ST-T wave changes on EKG.    · CAD s/p reported CABG x 3 approximately 20 years ago. [facility] records available or recent ischemic evaluation per patient. · Acute CHF with unknown ejection fraction. remains mildly hypervolemic on exam.  proBNP 4300. · Acute hypoxic respiratory failure, on 2L NC. · Right-sided pneumonia, on antibiotics. · UTI. +UC for Proteus. Leukocytosis. · Pulmonary nodules noted on CTA.  Pulmonology consulted. · LAMAR on CKD, improved (Cr 1.5 --> 1.3 --> 1.2 today). · Possible liver cirrhosis on CT. Denies alcohol history. 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 · HTN, controlled. · HLD, on statin therapy.   · Previous tobacco abuse.  · Chronic thrombocytopenia. Stable. · T2DM, with peripheral neuropathy. · Clinical deconditioning. Wheelchair bound. Palliative care following -- full code. Recommendations:  · Continue diuresis; switch to oral meds tomorrow  · Continue amiodarone - transition to PO dosing  · Continue Lovenox - transition to Eliquis if ok from neurosurgery standpoint        We will follow. Discussed with Dr. Polina Thompson PA-C      PHYSICIAN ADDENDUM:  I independently reviewed the above documentation and made amendments as needed. I formulated the assessment and plan with the JERAD with my contribution being >50%.  Plan discussed with the patient/family/care team.      Chris George MD, [facility]  Interventional Cardiology/Structural Heart Disease  Office: [phone]  Coordinator: Anny Cardoza

## 2022-04-13 NOTE — CARE COORDINATION
Call received from Gonzalo Sheehan pt has pre-cert for [facility], good through tomorrow.

## 2022-04-13 NOTE — ANESTHESIA POSTPROCEDURE EVALUATION
Department of Anesthesiology  Postprocedure Note    Patient: Stephany Buerger  MRN: [id number]  YOB: 1934  Date of evaluation: 4/13/2022  Time:  9:31 AM     Procedure Summary     Date: 04/12/22 Room / Location: [facility] CATH LAB; [facility] ECHO    Anesthesia Start: 9065 Anesthesia Stop: 1127    Procedure: SEY GOVIND CARDIOVERSION W/ ANES Diagnosis:     Scheduled Providers: Barrie Peacock MD; Edith Ahumada, APRN - CRNA Responsible Provider: Barrie Peacock MD    Anesthesia Type: MAC ASA Status: 4          Anesthesia Type: MAC    Donna Phase I:      Donna Phase II:      Last vitals: Reviewed and per EMR flowsheets.        Anesthesia Post Evaluation    Patient location during evaluation: PACU  Patient participation: complete - patient participated  Level of consciousness: awake  Pain score: 0  Airway patency: patent  Nausea & Vomiting: no nausea  Complications: no  Cardiovascular status: hemodynamically stable  Respiratory status: acceptable  Hydration status: stable

## 2022-04-13 NOTE — CONSULTS
Patient currently admitted with diagnosis of Diastolic heart failure. Patient was alert and oriented laying in bed during the consultation. He was engaged and asked appropriate questions throughout the education session. He is agreeable to scheduling with his PCP (Attempted to call to schedule received error message). Scheduling with the CHF clinic and Cardiology APRN Yes. Scale provided to patient for daily use and monitoring, he states they only weigh him once a week in assisted living. Future Appointments   Date Time Provider Orly Hendricks   4/27/2022 12:15 PM [facility] ROOM 1 [facility]   5/12/2022 10:00 AM NAS Esqueda - CNP [facility] CARDIO [facility]            We reviewed the introduction to Heart Failure, the HF zones, signs and symptoms to report on day 1 of onset, medications, medication compliance, the importance of obtaining daily weights, following a low sodium diet, reading food labels for the sodium content, keeping physician appointments, and smoking cessation. We discussed writing / tracking daily weights on a calendar / log because a 5 pound gain in 1 week can sneak up if you are not tracking it. Contributing risk factors for Heart Failure are identified as assited living, limited access to preparing meals. I advised patient they can reduce the risk for Heart Failure exacerbations by modifying / controlling the risk factors. We discussed self-managed care which includes the following:  to take medications as prescribed, report any intolerable side effects of medications to the cardiologist / doctor, do not just stop taking the medication; follow a cardiac heart healthy / low sodium diet; weigh yourself daily, exercise regularly- per doctor recommendation and not to smoke or use an excess amount of alcohol. We discussed calling the cardiologist / doctor with a weight gain of 3 pounds in one day or a total of 5 pounds or more in one week.  Also, if you should have a significant weight loss of 3# or more in one day to call the doctor, they may need to decrease or hold the diuretic dose. On days you feels nauseated and not eating / drinking, having emesis or diarrhea,  informed to call the cardiologist  / doctor, they may need to decrease or hold diuretic to avoid dehydration. I stressed the importance of informing their cardiologist the first day of onset of any of the signs and symptoms in the \"Yellow Zone\" of the HF Zones. Patient verbalizes understanding. Greater than 30 minutes was spent educating patient. The Heart Failure Booklet given to the patient with additional patient education addressing:  · What is Heart Failure? · Things You Can Do to Live Well with HF  · How to Take Your Medications  · How to Eat Less Salt  · Union its Salt? · Exercising Well with Heart Failure  · Signs and symptoms of HF to report  · Weight Yourself Each Day  · Home Self Management- activity, weight tracking, taking medications as prescribed, meals /diet planning (sodium and fluid restriction), how to read food labels, keeping physician follow ups, smoking cessation, follow the Heart Failure Zones  · The Heart Failure zones  · Every Dose Every Day      Instructed  to call 911 if you have any of the following symptoms:    ·    Struggling to breathe unrelieved with rest,  ·    Having chest pain  ·    Have confusion or cant think clearly      The patient is ordered:  Diet: ADULT DIET; Regular;  Low Sodium (2 gm)   Sodium controlled diet Yes  Fluid restriction daily ordered (fluid restriction recommended if patient is hyponatremic and/or diuretic is initiated or increased) No  FR:   Daily Weights:   Patient Vitals for the past 96 hrs (Last 3 readings):   Weight   04/13/22 0600 191 lb 9 oz (86.9 kg)   04/12/22 0600 189 lb 8 oz (86 kg)   04/12/22 0555 189 lb 12.8 oz (86.1 kg)     I/O:     Intake/Output Summary (Last 24 hours) at 4/13/2022 1441  Last data filed at 4/13/2022 1255  Gross per 24 hour   Intake 500 ml   Output 600 ml   Net -100 ml            Pamela Damián RN BSN, RN  Heart Failure Navigator        CONGESTIVE HEART FAILURE (CHF) AHA GUIDELINES  (Must be completed for Primary Diagnosis CHF or History of CHF)    Discharge Plan:  I placed the Heart Failure Home Instructions in patient's discharge instructions. Per Heart Failure GWTG, the patient should have a follow-up appointment made within 7 days of discharge.     New Diagnosis No    ECHO Results most recent:  No results found for: LVEF, LVEFMODE                                     Social History     Tobacco Use   Smoking Status Former Smoker    Quit date: 2002    Years since quittin.0   Smokeless Tobacco Never Used        Immunization History   Administered Date(s) Administered    Td, unspecified formulation 2012          Angiotensin-Converting-Enzyme (ACE) inhibitor ordered:  [] Yes  [x] No (specify contraindication):    [] Contraindicated  [] Hypotensive patient who was at immediate risk of cardiogenic shock  [] Hospitalized patient who experienced marked azotemia  [] Other Contraindications  [] Not Eligible  [] Not Tolerant  [] Patient Reason  [] System Reason  [] Other Reason    Angiotensin II receptor blockers (ARB) ordered:  [] Yes  [x] No (specify contraindication):    [] Contraindicated  [] Hypotensive patient who was at immediate risk of cardiogenic shock  [] Hospitalized patient who experienced marked azotemia  [] Other Contraindications    ARNI - Angiotensin Receptor Neprilysin Inhibitor ordered:  [] Yes  [x] No (specify contraindication):    [] ACE inhibitor use within the prior 36 hours  [] Allergy  [] Hyperkalemia  [] Hypotension  [] Renal dysfunction defined as creatinine > 2.5 mg/dL in men or > 2.0 mg/dL in women  [] Other Contraindications  [] Not Eligible  [] Not Tolerant  [] Patient Reason  []System Reason  []Other Reason      Beta Blocker (Carvedilol, Metoprolol Succinate, or Bisoprolol) ordered: [x] Yes toprol XL  [] No (specify contraindication):    [] Contraindicated  [] Asthma  [] Fluid Overload  [] Low Blood Pressure  [] Patient recently treated with an intravenous positive inotropic agent  [] Other Contraindications  [] Not Eligible  [] Not Tolerant  [] Patient Reason  [] System Reason    SGLT2 Inhibitor ordered:  [] Yes  [x] No (specify contraindication):    [] Contraindicated  [] Patient currently on dialysis  [] Ketoacidosis  [] Known hypersensitivity to the medication  [] Type I diabetes (not approved for use in patients with Type I diabetes due to increased risk of ketoacidosis)  [] Other Contraindications  [] Not Eligible  [] Not Tolerant  [] Patient Reason  [x] System Reason  [] Other Reason    Aldosterone Antagonist ordered:  [] Yes  [x] No (specify contraindication):    [] Contraindicated  [] Allergy due to aldosterone receptor antagonist  [] Hyperkalemia  [] Renal dysfunction defined as creatinine >2.5 mg/dL in men or >2.0 mg/dL in women.   [] Other contraindications  [] Not Eligible  [] Not Tolerant  [] Patient Reason  [] System Reason  [] Other Reason

## 2022-04-13 NOTE — PROGRESS NOTES
[facility]  [address], [address], [address]      Pulmonary Progress Note      SUBJECTIVE:  Claims stable with breathing (?) with ronchorous cough and minimal sputum production, unclear if close to baseline. Still on 2 li NC, sitting up in bed, claims using incentive spirometer and flutter device.     OBJECTIVE    Medications    Continuous Infusions:   amiodarone 450mg/250ml D5W infusion 1 mg/min (22 0206)    sodium chloride      dextrose         Scheduled Meds:   apixaban  5 mg Oral BID    metoprolol succinate  50 mg Oral BID    [START ON 2022] amiodarone  400 mg Oral BID    Followed by   Edmundwood Jackson ON 2022] amiodarone  200 mg Oral Daily    furosemide  40 mg IntraVENous BID    ipratropium-albuterol  1 ampule Inhalation TID    atorvastatin  40 mg Oral Daily    finasteride  5 mg Oral Daily    niacin  500 mg Oral Nightly    tamsulosin  0.4 mg Oral Daily    sodium chloride flush  5-40 mL IntraVENous 2 times per day    cefepime  2,000 mg IntraVENous Q12H    doxycycline (VIBRAMYCIN) IV  100 mg IntraVENous Q12H    insulin lispro  0-6 Units SubCUTAneous TID WC    insulin lispro  0-3 Units SubCUTAneous Nightly       PRN Meds:perflutren lipid microspheres, sodium chloride flush, sodium chloride, polyethylene glycol, acetaminophen **OR** acetaminophen, glucose, dextrose, glucagon (rDNA), dextrose    Physical    VITALS:  BP (!) 106/58   Pulse 78   Temp 97.3 °F (36.3 °C) (Temporal)   Resp 16   Ht 6' 1\" (1.854 m)   Wt 191 lb 9 oz (86.9 kg)   SpO2 94%   BMI 25.27 kg/m²     24HR INTAKE/OUTPUT:      Intake/Output Summary (Last 24 hours) at 2022 1543  Last data filed at 2022 1452  Gross per 24 hour   Intake 380 ml   Output 350 ml   Net 30 ml       24HR PULSE OXIMETRY RANGE:    SpO2  Av.7 %  Min: 88 %  Max: 98 %    General appearance: alert, appears stated age and cooperative  Lungs: rhonchi bilaterally with cough  Heart: regular rate and rhythm, S1, S2 normal, no murmur, click, rub or gallop  Abdomen: soft, non-tender; bowel sounds normal; no masses,  no organomegaly  Extremities: extremities normal, atraumatic, no cyanosis or edema  Neurologic: Mental status: Alert, oriented, thought content appropriate    Data    CBC:   Recent Labs     04/11/22  0736 04/12/22  0620 04/13/22  0635   WBC 15.2* 14.5* 14.2*   HGB 12.6 12.7 11.5*   HCT 39.4 39.3 36.5*   MCV 95.9 96.1 95.5   * 110* 122*       BMP:  Recent Labs     04/11/22  0602 04/12/22  0620 04/13/22  0635    134 134   K 3.8 4.3 4.0    99 100   CO2 25 24 24   BUN 30* 28* 30*   CREATININE 1.2 1.2 1.3*    ALB:3,BILIDIR:3,BILITOT:3,ALKPHOS:3)@    PT/INR: No results for input(s): PROTIME, INR in the last 72 hours. ABG:   No results for input(s): PH, PO2, PCO2, HCO3, BE, O2SAT, METHB, O2HB, COHB, O2CON, HHB, THB in the last 72 hours. Radiology/Other tests reviewed: none    Assessment:     Principal Problem:    Heart failure ([address]  Resolved Problems:    * No resolved hospital problems. *  bronchiectasis      Plan:       1. Cont with nebs and incentive spirometer/flutter device  2. Cont with oxygen, taper as tolerated  3. Watch fluid balance, diurese as per Cardiology  4. Repeat CT chest in 2-4 weeks to ffup on lymphadenopathy  5. CXR tomorrow      Time at the bedside, reviewing labs and radiographs, reviewing notes and consultations, discussing with staff and family was more than 35 minutes. Thanks for letting us see this patient in consultation. Please contact us with any questions. Office [phone] or after hours through [employer], [phone].

## 2022-04-13 NOTE — PROGRESS NOTES
palpitations, paroxysmal nocturnal dyspnea. Respiratory:  Shortness of breath, coughing, sputum production, hemoptysis, wheezing, orthopnea. Gastrointestinal:  Nausea, vomiting, diarrhea, heartburn, constipation, abdominal pain, hematemesis, hematochezia, melena, acholic stools  Genito-Urinary:  Dysuria, urgency, frequency, hematuria  Musculoskeletal:  Joint pain, joint stiffness, joint swelling, muscle pain  Neurology:  Headache, focal neurological deficits, weakness, numbness, paresthesia  Derm:  Rashes, ulcers, excoriations, bruising  Extremities:  Decreased ROM, peripheral edema, mottling    Objective:    BP (!) 106/58   Pulse 78   Temp 97.3 °F (36.3 °C) (Temporal)   Resp 16   Ht 6' 1\" (1.854 m)   Wt 191 lb 9 oz (86.9 kg)   SpO2 94%   BMI 25.27 kg/m²     General appearance: Elderly male in no apparent distress, appears stated age and cooperative. HEENT: Conjunctivae/corneas clear. Mucous membranes moist.  Neck: Supple. No JVD. Respiratory:  Clear to auscultation bilaterally. Normal respiratory effort. Cardiovascular:  RRR. S1, S2 without MRG. PV: Pulses palpable. +1 pitting edema of the BLE. Abdomen: Soft, non-tender, non-distended. +BS  Musculoskeletal: No obvious deformities. Skin: Normal skin color. No rashes or lesions. Good turgor. Neurologic:  Grossly non-focal. Awake, alert, following commands.    Psychiatric: Alert and oriented, thought content appropriate, normal insight and judgement    Medications:  REVIEWED DAILY    Infusion Medications    amiodarone 450mg/250ml D5W infusion 1 mg/min (04/13/22 0206)    sodium chloride      dextrose       Scheduled Medications    metoprolol succinate  50 mg Oral BID    [START ON 4/14/2022] amiodarone  400 mg Oral BID    Followed by   Marly Lopez ON 4/24/2022] amiodarone  200 mg Oral Daily    furosemide  40 mg IntraVENous BID    ipratropium-albuterol  1 ampule Inhalation TID    enoxaparin  1 mg/kg (Adjusted) SubCUTAneous BID    aspirin  81 mg Oral Daily    atorvastatin  40 mg Oral Daily    finasteride  5 mg Oral Daily    niacin  500 mg Oral Nightly    tamsulosin  0.4 mg Oral Daily    sodium chloride flush  5-40 mL IntraVENous 2 times per day    cefepime  2,000 mg IntraVENous Q12H    doxycycline (VIBRAMYCIN) IV  100 mg IntraVENous Q12H    insulin lispro  0-6 Units SubCUTAneous TID WC    insulin lispro  0-3 Units SubCUTAneous Nightly     PRN Meds: perflutren lipid microspheres, sodium chloride flush, sodium chloride, polyethylene glycol, acetaminophen **OR** acetaminophen, glucose, dextrose, glucagon (rDNA), dextrose    Labs:     Recent Labs     04/11/22  0736 04/12/22  0620 04/13/22  0635   WBC 15.2* 14.5* 14.2*   HGB 12.6 12.7 11.5*   HCT 39.4 39.3 36.5*   * 110* 122*       Recent Labs     04/11/22  0602 04/12/22  0620 04/13/22  0635    134 134   K 3.8 4.3 4.0    99 100   CO2 25 24 24   BUN 30* 28* 30*   CREATININE 1.2 1.2 1.3*   CALCIUM 9.0 9.0 8.9       No results for input(s): PROT, ALB, ALKPHOS, ALT, AST, BILITOT, AMYLASE, LIPASE in the last 72 hours. No results for input(s): INR in the last 72 hours. No results for input(s): Dalton Favre in the last 72 hours.     Chronic labs:    Lab Results   Component Value Date    CHOL 93 04/09/2022    TRIG 81 04/09/2022    HDL 33 04/09/2022    LDLCALC 44 04/09/2022    TSH 1.100 04/10/2022    INR 1.2 05/16/2019    LABA1C 7.2 07/06/2021       Radiology: REVIEWED DAILY    Assessment:  Acute hypoxic respiratory failure  HFpEF EF 50-55%  A. fib RVR status post DCCV  Bronchiectasis  Mediastinal and hilar LAD  Proteus mirabilis UTI  CAD  DM2, A1c 7.2  History of CLL  Thrombocytopenia  CKD stage III  History of urinary retention   Thrombocytopenia   Hepatic steatosis     Plan:  Cardiology and pulmonology following  Wean oxygen as tolerated, down to 2 L nasal cannula  Continue duo nebs, incentive spirometry, and flutter device  Continue cefepime + doxy (day 5)  Continue diuresis per cardiology recommendations  Continue amiodarone + Toprol XL per cardiology status post successful DCCV  Monitor renal function, I&O ( - 1L)  Therapeutic Lovenox to transition to Eliquis and discontinue aspirin on discharge  Recommend outpatient repeat CT chest in 2 to 4 weeks to follow-up on lymphadenopathy    DVT Prophylaxis [x] Lovenox  []  Heparin [] DOAC [] PCDs [] Ambulation    GI Prophylaxis [] PPI  [] H2 Blocker   [] Carafate  [x] Diet/Tube Feeds   Level of care [] Med/Surg  [x] Intermediate  []  ICU   Diet ADULT DIET; Regular; Low Sodium (2 gm)    Family contact []  N/A    [] At bedside  [] Phone call     Discharge Plan: [facility]alom Lopez when stable    +++++++++++++++++++++++++++++++++++++++++++++++++  Anand SUMEET Killian/ Sudarshan [address]  +++++++++++++++++++++++++++++++++++++++++++++++++  NOTE: This report was transcribed using voice recognition software. Every effort was made to ensure accuracy; however, inadvertent computerized transcription errors may be present.

## 2022-04-14 NOTE — PROGRESS NOTES
[facility]  [address], [address], [address]      Pulmonary Progress Note      SUBJECTIVE:  Claims stable with breathing, complaining earlier of constipation but currently feeling better as had a bowel movement earlier. Currently on 3 li NC, sitting up in bed, similar/better with cough/sputum production, some bleeding from nose as had been picking on it.     OBJECTIVE    Medications    Continuous Infusions:   sodium chloride      dextrose         Scheduled Meds:   piperacillin-tazobactam  3,375 mg IntraVENous Q8H    [START ON 4/15/2022] furosemide  40 mg Oral BID    lactobacillus  2 capsule Oral TID    apixaban  5 mg Oral BID    metoprolol succinate  50 mg Oral BID    amiodarone  400 mg Oral BID    Followed by   Rosalinda Mendez ON 2022] amiodarone  200 mg Oral Daily    furosemide  40 mg IntraVENous BID    ipratropium-albuterol  1 ampule Inhalation TID    atorvastatin  40 mg Oral Daily    finasteride  5 mg Oral Daily    niacin  500 mg Oral Nightly    tamsulosin  0.4 mg Oral Daily    sodium chloride flush  5-40 mL IntraVENous 2 times per day    insulin lispro  0-6 Units SubCUTAneous TID WC    insulin lispro  0-3 Units SubCUTAneous Nightly       PRN Meds:acetaminophen **OR** acetaminophen, perflutren lipid microspheres, sodium chloride flush, sodium chloride, polyethylene glycol, glucose, dextrose, glucagon (rDNA), dextrose    Physical    VITALS:  /62   Pulse 86   Temp 96.5 °F (35.8 °C) (Temporal)   Resp 18   Ht 6' 1\" (1.854 m)   Wt 189 lb 6 oz (85.9 kg)   SpO2 95%   BMI 24.99 kg/m²     24HR INTAKE/OUTPUT:      Intake/Output Summary (Last 24 hours) at 2022 1535  Last data filed at 2022 1227  Gross per 24 hour   Intake 240 ml   Output 1725 ml   Net -1485 ml       24HR PULSE OXIMETRY RANGE:    SpO2  Av.5 %  Min: 91 %  Max: 95 %    General appearance: alert, appears stated age and cooperative  Lungs: rhonchi bilaterally with cough  Heart: regular rate and rhythm, S1, S2 normal, no murmur, click, rub or gallop  Abdomen: soft, non-tender; bowel sounds normal; no masses,  no organomegaly  Extremities: extremities normal, atraumatic, no cyanosis or edema  Neurologic: Mental status: Alert, oriented, thought content appropriate    Data    CBC:   Recent Labs     04/12/22  0620 04/13/22  0635 04/14/22  0716   WBC 14.5* 14.2* 16.6*   HGB 12.7 11.5* 12.5   HCT 39.3 36.5* 39.0   MCV 96.1 95.5 94.7   * 122* 111*       BMP:  Recent Labs     04/12/22  0620 04/13/22  0635 04/14/22  0716    134 135   K 4.3 4.0 4.2   CL 99 100 100   CO2 24 24 24   BUN 28* 30* 31*   CREATININE 1.2 1.3* 1.3*    ALB:3,BILIDIR:3,BILITOT:3,ALKPHOS:3)@    PT/INR: No results for input(s): PROTIME, INR in the last 72 hours. ABG:   No results for input(s): PH, PO2, PCO2, HCO3, BE, O2SAT, METHB, O2HB, COHB, O2CON, HHB, THB in the last 72 hours. Radiology/Other tests reviewed: CXR reviewed with slightly increased haziness right lower lung field and retrocardiac opacity compared to previous    Assessment:     Principal Problem:    Heart failure ([address]  Resolved Problems:    * No resolved hospital problems. *  bronchiectasis      Plan:       1. Cont with nebs and incentive spirometer/flutter device  2. Cont with oxygen, taper as tolerated  3. Watch fluid balance, diurese as per Cardiology  4. Repeat CT chest in 2-4 weeks to ffup on lymphadenopathy  5. Sputum culture showing pseudomonas, cont with antibiotics, should be able to give for about 1.5 weeks total  6. OOB to chair, ambulate as tolerated      Time at the bedside, reviewing labs and radiographs, reviewing notes and consultations, discussing with staff and family was more than 35 minutes. Thanks for letting us see this patient in consultation. Please contact us with any questions. Office [phone] or after hours through [employer], [phone].

## 2022-04-14 NOTE — PROGRESS NOTES
Occupational Therapy  OT BEDSIDE TREATMENT NOTE   [facility] [address]  [address] [place], [place]       Date:2022  Patient Name: Nasreen Stern  MRN: [id number]  : 1934  Room: [address] [place] [address]     Per OT Eval:    Evaluating OT: Cayetano Car, [address] [place], OTR/L [id number]  Referring Christina Chávez DO  Specific Provider Orders: OT eval and treat   Recommended Adaptive Equipment: 24 hr physical assist      Diagnosis: heart failure (s/p fall, no fxs found)   Surgery: none   Pertinent Medical History: CAD, CLL, HTN, HLD     Precautions:  Fall Risk, O2     Assessment of current deficits   [x]? Functional mobility           [x]? ADLs           [x]? Strength                  [x]? Cognition   [x]? Functional transfers         [x]? IADLs         [x]? Safety Awareness   [x]? Endurance   []? Fine Coordination                         [x]? Balance      []? Vision/perception   [x]? Sensation     []? Gross Motor Coordination             []? ROM           []?  Delirium                   []? Motor Control      OT PLAN OF CARE   OT POC based on physician orders, patient diagnosis and results of clinical assessment     Frequency/Duration: 2-3 days/wk for 2 weeks PRN   Specific OT Treatment to include:   * Instruction/training on adapted ADL techniques and AE recommendations to increase functional independence within precautions       * Training on energy conservation strategies, correct breathing pattern and techniques to improve independence/tolerance for self-care routine  * Functional transfer/mobility training/DME recommendations for increased independence, safety, and fall prevention  * Patient/Family education to increase follow through with safety techniques and functional independence  * Recommendation of environmental modifications for increased safety with functional transfers/mobility and ADLs  * Cognitive retraining/development of therapeutic activities to improve problem solving, judgement, memory, and attention for increased safety/participation in ADL/IADL tasks  * Therapeutic exercise to improve motor endurance, ROM, and functional strength for ADLs/functional transfers  * Therapeutic activities to facilitate/challenge dynamic balance, stand tolerance for increased safety and independence with ADLs  * Neuro-muscular re-education: facilitation of righting/equilibrium reactions, midline orientation, scapular stability/mobility, normalization of muscle tone, and facilitation of volitional active controled movement     Home Living: Pt presents from the [place]. He reports using electric w/c and SPTs.      Pain Level: Pt complained of stomach pain this session  Cognition: A&O: 3/4; Follows 1-2 step directions, pleasant & cooperative, very motivated              Memory:  fair+              Sequencing:  fair+             Problem solving:  fair+             Judgement/safety:  fair+     Functional Assessment:  AM-PAC Daily Activity Raw Score: 14/24    Initial Eval Status  Date: 4/10/22 Treatment Status  Date:  4/14/22 STGs=LTGs  Time Frame: 10-14 days   Feeding SBA   DNT  Pt denying lunch meal this date  SUP   Grooming SBA (seated for facial washing)  SBA  Pt washed face, applied deodorant seated EOB   SUP   UB Dressing Mod A (due to limited ROM)  modA  [facility] gown supine in bed, limited ROM of BUE's Min A   LB Dressing Max A (assist with socks) Dependent  [facility] socks Mod A    Bathing Max A (simulated) maxA  Pt able to wash of UB, assistance to wash of LB, buttocks and isma area Mod A    Toileting Max A Dependent  Pt having of external male catheter. Pt being incontinent of bowel multiple times during session, assistance with hygeine Mod A   Bed Mobility  Log roll: Mod A  Supine to sit: Mod A   Sit to supine: Mod A  maxA  Supine<>EOB  EOB<>Supine Log roll CGA  Supine to sit: CGA   Sit to supine: CGA   Functional Transfers Sit to stand: Mod A   Stand to sit:Mod A  Commode: NT (RN notified to order [facility] for rm) maxA  Sit to Stand  Stand to Sit  x3 stands CGA   Functional Mobility NT (pt too fearful) DNT  modA per previous level Min A if appropriate (pt reported not completing prior and only completed SPTs)   Balance Sitting: SBA  Standing: Min A  Sitting EOB:  CGA    Standing:  maxA     Activity Tolerance fair Fair-     Visual/  Perceptual Glasses: yes                           Comments/Treatment/Education: Upon arrival pt supine in bed, agreeable to therapy, nursing present okaying pt to be seen this session. Pt compelted of bed mobility, transfers and light ADL tasks this session. Pt being incontinent of bowel with stands, and while rolling side to side, assistance to hygiene. Pt educated on role of OT, goals to be reached, importance of OOB activity, safety and hand placement with transfers, and compensatory strategies to assist with ADL tasks. Pt stating of feeling \"lousy\" during session, providing of rest breaks as needed. At end of session, pt supine in bed on bed pan, all tubes and lines intact, call light within reach, nursing present. · Pt has made slow progress towards set goals.    · Continue with current plan of care focusing on increasing of independency with transfers and ADL tasks      Treatment Time In: 11:25am            Treatment Time Out: 12:04am           Treatment Charges: Mins Units   Ther Ex  88509     Manual Therapy 76630     Thera Activities 07063 15 1   ADL/Home Mgt 86045 24 2   Neuro Re-ed 63568     Group Therapy      Orthotic manage/training  02230     Non-Billable Time     Total Timed Treatment 39 3       Blanca Shaq HONG/L [id number]

## 2022-04-14 NOTE — PROGRESS NOTES
Physical Therapy  Physical Therapy Treatment    Name: Som De Leon  : 1934  MRN: [id number]      Date of Service: 2022    Evaluating PT:  Percy Moritz, PT, DPT [id number]    Room #:  1644/8468-E  Diagnosis:  Tachycardia [R00.0]  Heart failure ([facility] [I50.9]  Acute cystitis with hematuria [N30.01]  Pneumonia due to infectious organism, unspecified laterality, unspecified part of lung [J18.9]  Congestive heart failure, unspecified HF chronicity, unspecified heart failure type ([facility] [I50.9]  PMHx/PSHx:  Leukemia, HLD, HTN, CAD, CABG, cervical fusion  Procedure/Surgery:  GOVIND (2022); GOVIND and Cardioversion (2022)  Precautions:  Falls, external catheter, O2  Equipment Needs:  TBD  Equipment Owned: Manual wheelchair, electric wheelchair    SUBJECTIVE:    Pt from nursing facility. Pt non-ambulatory PTA; used manual wheelchair or electric wheelchair for mobility; performed stand pivot transfers to/from wheelchair independently. OBJECTIVE:   Initial Evaluation  Date: 2022 Treatment  2022 Short Term/ Long Term   Goals   AM-PAC 6 Clicks     Was pt agreeable to Eval/treatment? Yes Yes    Does pt have pain? No c/o pain Mild abdomen    Bed Mobility  Rolling: NT  Supine to sit: ModA  Sit to supine: NT  Scooting: NT Rolling: MaxA  Supine to sit: MaxA  Sit to supine: MaxA  Scooting: NT Rolling: Independent  Supine to sit: Independent  Sit to supine: Independent  Scooting: Independent   Transfers Sit to stand: ModA  Stand to sit: ModA  Stand pivot: ModA with no AD;  ModA with [employer] Sit to stand: MaxA with [employer]  Stand to sit: MaxA with [employer]  Stand pivot: NT Sit to stand: Ryan  Stand to sit: Ryan  Stand pivot: Ryan with AAD   Ambulation    3 feet with no AD ModA; 3 feet with [employer] ModA 1 foot with [employer] MaxA 3 feet with AAD Ryan   Wheelchair mobility  NT NT 50 feet using BUE Independent   ROM BUE:  See OT note  BLE:  WFL     Strength BUE:  See OT note  BLE:  Grossly 4+/5     Balance Sitting EOB: SBA  Dynamic Standing:  ModA with [employer] Sitting EOB:  SBA  Dynamic Standing:  MaxA with [employer] Sitting EOB:  Independent  Dynamic Standing:  Ryan with AAD     Pt is A & O x 4  Sensation:  No reports of numbness/tingling to extremities  Edema:  Unremarkable    Vitals:   HR 88, SpO2 92% (3 L O2) during activity. Patient education  Pt educated on safety during functional mobility. Patient response to education:   Pt verbalized understanding Pt demonstrated skill Pt requires further education in this area   Yes Yes Yes       ASSESSMENT:    Comments:  Session cleared by nursing. Patient in semi-Reece's position with OT present upon arrival; agreeable to PT session with OT collaboration. Required increased time, assistance of trunk and BLE to perform bed mobility. Tolerated sitting EOB for extended period of time. Denied dizziness. Required increased time, verbal cues related to positioning and sequencing to ensure safety during functional transfers. Patient noted to be incontinent of stool upon standing. Pad underneath patient replaced; patient returned to sitting. Upon subsequent sit to stand transfer, hygiene performed; required assistance to maintain static standing balance while using [employer] for BUE support; exhibited flexed forward standing posture, requiring verbal cues to correct. Performed side step with decreased speed, decreased step height/length, unsteadiness. Activity limited by fatigue. Patient again incontinent of stool; returned to sitting and supine with assistance. Nursing notified. Rolling performed several times in order for linen/TAPS/pads underneath patient to be replaced. Patient again incontinent of stool in supine and in side-lying. Nursing present to assist in performing hygiene and replacing pads underneath patient. Patient maintained side-lying position without assistance. Patient left in semi-Reece's position with bed pan in place, nursing present, call light in reach.     Treatment:  Patient practiced and was instructed in the following treatment:     Bed mobility - verbal cues to facilitate proper positioning and sequencing; physical assistance provided during activity   Static sitting - performed to promote upright tolerance, postural awareness, and balance maintenance   Functional transfers - verbal cues to facilitate proper positioning and sequencing, particularly related to hand/foot placement; physical assistance provided during activity   Ambulation - verbal cues to facilitate proper positioning; physical assistance provided during activity    PLAN:    Patient is making good progress towards established goals. Will continue with current POC.       Time in  1135  Time out  1205    Total Treatment Time  30 minutes     CPT codes:  [] Gait training 36932 0 minutes  [] Manual therapy 29356 0 minutes  [x] Therapeutic activities 44093 30 minutes  [] Therapeutic exercises 89595 0 minutes  [] Neuromuscular reeducation 61705 0 minutes    Tico Soto, PT, DPT  [id number]

## 2022-04-14 NOTE — CARE COORDINATION
Discussed with attending, no discharge today, awaiting sensitivity, and MBS for suspected aspiration pneumonia; stat updates for therapy called to Laura Wolfe at ; updated Roxana with [facility].

## 2022-04-14 NOTE — PROGRESS NOTES
Message sent  To Dr. Dwayne Alcocer to recommend swallow evaluation as per [facility] notes.
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
## 2022-04-14 NOTE — PROGRESS NOTES
Hospitalist Progress Note      Synopsis: Patient admitted for acute hypoxic respiratory failure 2/2 PNA and decompensated HF. Patient presents to the ED per the direction of the [place] with concerns of heart failure after presenting there with shortness of breath.   On arrival he was hypoxic at 88% on room air. Patient had a episode of atrial flutter with RVR in the ED for which he was given Cardizem and digoxin. Laboratory work-up significant for elevated BNP 3513, leukocytosis. CT chest shows right upper and right middle lobe pulmonary nodules as well as right middle lobe lingular and bilateral lower lobe infiltrates.  Urinalysis positive for infection.  Patient was given Lasix and started on doxycycline and cefepime. Palliative care was consulted for goals of care discussion. Patient would like to remain a full code. Cardiology was consulted for decompensated CHF. He is being diuresed and underwent successful DCCV on . He was placed on amiodarone drip which has now been transitioned to p.o. Therapeutic Lovenox has been transitioned to p.o. Eliquis. Pulmonology is also following given his continued oxygen needs. Neurosurgery was consulted for evaluation of incidental finding of a right frontal small hygroma who does not feel the patient requires any further treatment or follow-up. Sputum is growing gram-negative rods therefore antibiotic was switched to [address] [place] [address] day 6     Subjective:  Stable overnight. No issues reported. Patient seen and examined. States he is doing awful. Stomach is tight. Needs to have a bowel movement. Also reports that the medications he was given this morning made him sick. Records reviewed. Temp (24hrs), Av.3 °F (36.3 °C), Min:97 °F (36.1 °C), Max:97.4 °F (36.3 °C)    DIET: ADULT DIET; Regular;  Low Sodium (2 gm)  CODE: Full Code    Intake/Output Summary (Last 24 hours) at 2022 5864  Last data filed at 2022 0706  Gross per 24 hour   Intake 380 ml   Output 2075 ml   Net -1695 ml       Review of Systems: All bolded are positive; please see HPI  General:  Fever, chills, diaphoresis, fatigue, malaise, night sweats, weight loss  Psychological:  Anxiety, disorientation, hallucinations. ENT:  Epistaxis, headaches, vertigo, visual changes. Cardiovascular:  Chest pain, irregular heartbeats, palpitations, paroxysmal nocturnal dyspnea. Respiratory:  Shortness of breath, coughing, sputum production, hemoptysis, wheezing, orthopnea. Gastrointestinal:  Nausea, vomiting, diarrhea, heartburn, constipation, abdominal pain, hematemesis, hematochezia, melena, acholic stools  Genito-Urinary:  Dysuria, urgency, frequency, hematuria  Musculoskeletal:  Joint pain, joint stiffness, joint swelling, muscle pain  Neurology:  Headache, focal neurological deficits, weakness, numbness, paresthesia  Derm:  Rashes, ulcers, excoriations, bruising  Extremities:  Decreased ROM, peripheral edema, mottling    Objective:    BP (!) 113/55   Pulse 83   Temp 97.4 °F (36.3 °C) (Temporal)   Resp 17   Ht 6' 1\" (1.854 m)   Wt 189 lb 6 oz (85.9 kg)   SpO2 91%   BMI 24.99 kg/m²     General appearance: Elderly male in no apparent distress, appears stated age and cooperative. HEENT: Conjunctivae/corneas clear. Mucous membranes moist.  Neck: Supple. No JVD. Respiratory:  Clear to auscultation bilaterally. Normal respiratory effort. Cardiovascular:  RRR. S1, S2 without MRG. PV: Pulses palpable. +1 pitting edema of the BLE. Abdomen: Soft, non-tender, non-distended. +BS  Musculoskeletal: No obvious deformities. Skin: Normal skin color. No rashes or lesions. Good turgor. Neurologic:  Grossly non-focal. Awake, alert, following commands.    Psychiatric: Alert and oriented, thought content appropriate, normal insight and judgement    Medications:  REVIEWED DAILY    Infusion Medications    sodium chloride      dextrose       Scheduled Medications    apixaban  5 mg Oral BID    metoprolol succinate  50 mg Oral BID    amiodarone  400 mg Oral BID    Followed by   Elli Moreno ON 4/24/2022] amiodarone  200 mg Oral Daily    furosemide  40 mg IntraVENous BID    ipratropium-albuterol  1 ampule Inhalation TID    atorvastatin  40 mg Oral Daily    finasteride  5 mg Oral Daily    niacin  500 mg Oral Nightly    tamsulosin  0.4 mg Oral Daily    sodium chloride flush  5-40 mL IntraVENous 2 times per day    cefepime  2,000 mg IntraVENous Q12H    doxycycline (VIBRAMYCIN) IV  100 mg IntraVENous Q12H    insulin lispro  0-6 Units SubCUTAneous TID     insulin lispro  0-3 Units SubCUTAneous Nightly     PRN Meds: perflutren lipid microspheres, sodium chloride flush, sodium chloride, polyethylene glycol, acetaminophen **OR** acetaminophen, glucose, dextrose, glucagon (rDNA), dextrose    Labs:     Recent Labs     04/11/22  0736 04/12/22  0620 04/13/22  0635   WBC 15.2* 14.5* 14.2*   HGB 12.6 12.7 11.5*   HCT 39.4 39.3 36.5*   * 110* 122*       Recent Labs     04/12/22  0620 04/13/22  0635    134   K 4.3 4.0   CL 99 100   CO2 24 24   BUN 28* 30*   CREATININE 1.2 1.3*   CALCIUM 9.0 8.9       No results for input(s): PROT, ALB, ALKPHOS, ALT, AST, BILITOT, AMYLASE, LIPASE in the last 72 hours. No results for input(s): INR in the last 72 hours. No results for input(s): Bienvenido Hug in the last 72 hours.     Chronic labs:    Lab Results   Component Value Date    CHOL 93 04/09/2022    TRIG 81 04/09/2022    HDL 33 04/09/2022    LDLCALC 44 04/09/2022    TSH 1.100 04/10/2022    INR 1.2 05/16/2019    LABA1C 7.2 07/06/2021       Radiology: REVIEWED DAILY    Assessment:  Acute hypoxic respiratory failure  HFpEF EF 50-55%   A. fib RVR status post DCCV anticoagulated on Eliquis  CAP, GNR  Bronchiectasis  Mediastinal and hilar LAD  Proteus mirabilis UTI  CAD  DM2, A1c 7.2  History of CLL  Thrombocytopenia  CKD stage III  History of urinary retention   Thrombocytopenia   Hepatic steatosis Plan:  Cardiology and pulmonology following  Wean oxygen as tolerated, currently requiring 3 L nasal cannula  Continue duo nebs, incentive spirometry, and flutter device  Sputum growing gram-negative rods, antibiotic switched to Zosyn  Continue diuresis per cardiology recommendations, plans to switch to PO tomorrow  Continue amiodarone + Toprol XL per cardiology status post successful DCCV  Monitor renal function, I&O ( - 3L)   Recommend outpatient repeat CT chest in 2 to 4 weeks to follow-up on lymphadenopathy    DVT Prophylaxis [] Lovenox  []  Heparin [x] DOAC [] PCDs [] Ambulation    GI Prophylaxis [] PPI  [] H2 Blocker   [] Carafate  [x] Diet/Tube Feeds   Level of care [] Med/Surg  [x] Intermediate  []  ICU   Diet ADULT DIET; Regular; Low Sodium (2 gm)    Family contact [x]  N/A - pt A&O    [] At bedside  [] Phone call     Discharge Plan: Jodi Pendleton when stable.    +++++++++++++++++++++++++++++++++++++++++++++++++  Caden Garcia[address]  +++++++++++++++++++++++++++++++++++++++++++++++++  NOTE: This report was transcribed using voice recognition software. Every effort was made to ensure accuracy; however, inadvertent computerized transcription errors may be present.

## 2022-04-14 NOTE — PROGRESS NOTES
Cardiology Progress Note:    Narrative:  · 81 yo male with past medical history of coronary artery disease with a history of CABG x3 Saud LACEY), hypertension, hyperlipidemia, type 2 diabetes mellitus with peripheral neuropathy (wheelchair-bound), previous tobacco abuse, clinical deconditioning. · Presented to hospital after fall at nursing home; found to be in atrial flutter and acute heart failure  · Underwent DCCV to NSR with amiodarone now on board  · Complains of abdominal pain currently; denies shortness of breath or chest pain      Objective:  /62  P 78  R 18  T 96.5  Neck with no JVD  Lungs with diminished breath sounds with rhonchi bilaterally  Heart RRR with no M/G/R  Abdomen soft, non tender  Extremities with mild edema bilaterally  On 3L nasal canula with sats 91-95%  I/O (-) 2.6 liters since admission  Labs and vitals stable    GOVIND 4/12/22: no LILY clot      Assessment:  · Narrow complex tachycardia, likely atrial flutter with RVR. HR in the 110s-120s today. ? Onset unknown. CHADsVASc score at least 4 (HTN, age, CAD). ? On full dose Lovenox. ? Maintaining NSR s/p GOVIND-guided DCCV   · Elevated troponin, pattern not consistent with ACS.    ? Likely secondary to demand ischemia given tachycardia and LAMAR/CKD.  Nonspecific ST-T wave changes on EKG.    · CAD s/p reported CABG x 3 approximately 20 years ago. [facility] records available or recent ischemic evaluation per patient. · Acute CHF with unknown ejection fraction. remains mildly hypervolemic on exam.  proBNP 4300. · Acute hypoxic respiratory failure, on 2L NC. · Right-sided pneumonia, on antibiotics. · UTI. +UC for Proteus. Leukocytosis. · Pulmonary nodules noted on CTA.  Pulmonology consulted. · LAMAR on CKD, improved (Cr 1.5 --> 1.3 --> 1.2 today). · Possible liver cirrhosis on CT. Denies alcohol history. 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 · HTN, controlled.   · HLD, on statin therapy. · Previous tobacco abuse. · Chronic thrombocytopenia. Stable. · T2DM, with peripheral neuropathy. · Clinical deconditioning. Wheelchair bound. Palliative care following -- full code. Recommendations:  · Continue diuresis; switch to oral meds tomorrow  · Continue PO amiodarone  · Continue Eliquis        We will follow.     Discussed with Dr. Will Shaw PA-C

## 2022-04-14 NOTE — PLAN OF CARE
Problem: Skin Integrity:  Goal: Will show no infection signs and symptoms  Description: Will show no infection signs and symptoms  Outcome: Met This Shift  Goal: Absence of new skin breakdown  Description: Absence of new skin breakdown  Outcome: Met This Shift     Problem: Falls - Risk of:  Goal: Will remain free from falls  Description: Will remain free from falls  Outcome: Met This Shift  Goal: Absence of physical injury  Description: Absence of physical injury  Outcome: Met This Shift     Problem: Musculor/Skeletal Functional Status  Goal: Highest potential functional level  Outcome: Met This Shift  Goal: Absence of falls  Outcome: Met This Shift     Problem: OXYGENATION/RESPIRATORY FUNCTION  Goal: Patient will maintain patent airway  Outcome: Met This Shift     Problem: HEMODYNAMIC STATUS  Goal: Patient has stable vital signs and fluid balance  Outcome: Met This Shift     Problem: Pain:  Goal: Pain level will decrease  Description: Pain level will decrease  Outcome: Met This Shift

## 2022-04-15 NOTE — PROGRESS NOTES
Comprehensive Nutrition Assessment    Type and Reason for Visit:  Initial,RD Nutrition Re-Screen/LOS    Nutrition Recommendations/Plan: Continue Diet. Will Start ONS and monitor. Nutrition Assessment:  Pt adm w/ SOB and palpitations x ~2-3d pta. PMHx CHF, SDH (13'), CLL, CAD, DM, CKD, hepatic steatosis; adm w/ CHF exacerbation. Noted mild dysphagia, s/p BSE 4/14. Pt at risk d/t decreased appetite/intake since adm w/ noted constipation (resolving). Will Start ONS and monitor. Malnutrition Assessment:  Malnutrition Status: At risk for malnutrition (Comment)    Context:  Acute Illness     Findings of the 6 clinical characteristics of malnutrition:  Energy Intake:  1 - 75% or less of estimated energy requirements for 7 or more days  Weight Loss:  Unable to assess     Body Fat Loss:  No significant body fat loss     Muscle Mass Loss:  No significant muscle mass loss    Fluid Accumulation:  No significant fluid accumulation     Strength:  Not Performed    Estimated Daily Nutrient Needs:  Energy (kcal):  ; Weight Used for Energy Requirements:  Current     Protein (g):  1.3-1.5gm/kg IBW= 110-125; Weight Used for Protein Requirements:  Ideal        Fluid (ml/day):  ; Method Used for Fluid Requirements:  1 ml/kcal      Nutrition Related Findings:  A&O, U/L dentures, Abd/BS WDL, +1/2 edema, +constipation, -I/O's      Wounds:  None (B/L buttock redness noted only)       Current Nutrition Therapies:    ADULT DIET; Regular;  Low Sodium (2 gm)  ADULT ORAL NUTRITION SUPPLEMENT; Breakfast, Dinner; Diabetic Oral Supplement    Anthropometric Measures:  · Height: 6' 1\" (185.4 cm)  · Current Body Weight: 192 lb (87.1 kg) (bed 4/15)   · Admission Body Weight: 190 lb (86.2 kg) (bed 4/10)    · Usual Body Weight:  (UTO UBW 2/2 poor EMR wt hx pta)     · Ideal Body Weight: 184 lbs; % Ideal Body Weight     · BMI: 25.3  · Adjusted Body Weight:  ; No Adjustment   · Adjusted BMI:      · BMI Categories: Overweight (BMI 25.0-29. 9)       Nutrition Diagnosis:   · Inadequate oral intake related to altered GI function (2/2 constipation w/ poor appetite) as evidenced by intake 26-50%,constipation      Nutrition Interventions:   Food and/or Nutrient Delivery:  Continue Current Diet,Start Oral Nutrition Supplement (Continue Diet. Will Start ONS and monitor.)  Nutrition Education/Counseling:  Education not indicated   Coordination of Nutrition Care:  Continue to monitor while inpatient    Goals:  PO intake >75%       Nutrition Monitoring and Evaluation:   Behavioral-Environmental Outcomes:  None Identified   Food/Nutrient Intake Outcomes:  Diet Advancement/Tolerance,Food and Nutrient Intake,Supplement Intake  Physical Signs/Symptoms Outcomes:  Biochemical Data,Chewing or Swallowing,Constipation,GI Status,Nausea or Vomiting,Fluid Status or Edema,Nutrition Focused Physical Findings,Skin,Weight     Discharge Planning:     Too soon to determine     Electronically signed by Walker Ortega RD, LD on 4/15/22 at 10:14 AM EDT    Contact: ext [phone]
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
## 2022-04-15 NOTE — PLAN OF CARE
Cardiology Progress Note:     Narrative:  · 79 yo WM with PMH of coronary artery disease with a history of CABG x3 (Gayle LACEY), hypertension, hyperlipidemia, type 2 diabetes mellitus with peripheral neuropathy (wheelchair-bound), CKD, previous tobacco abuse, clinical deconditioning. · Presented to hospital after fall at nursing home, found to be in atrial flutter and acute heart failure. · Underwent DCCV to NSR --> now on PO amiodarone loading. Maintaining NSR today with PACs, HR in the 70s. · Patient notes of improvement in SOB since hospital admission. Denies CP, N/V, diaphoresis, dizziness, palpitations, near syncope or syncope. Denies PND/orthopnea. · Does admit to decreased urination today with LAMAR/CKD -- renal following for possible obstructive etiology.        Objective:  Lungs with diminished breath sounds with rhonchi bilaterally  Heart RRR with no M/G/R  Abdomen soft, non tender  Extremities with 1+ edema bilaterally  On 3L nasal canula with sats 91-97%  I/O (-) 2.1 liters since admission     GOVIND 4/12/22: no LILY clot        Assessment:  · Narrow complex tachycardia, likely atrial flutter with RVR.  HR in the 110s-120s today.    ? Onset unknown.  CHADsVASc score at least 4 (HTN, age, CAD). ? On Eliquis  ? Maintaining NSR s/p GOVIND-guided DCCV   · Elevated troponin, pattern not consistent with ACS.    ? Likely secondary to demand ischemia given tachycardia and LAMAR/CKD.  Nonspecific ST-T wave changes on EKG.    · CAD s/p reported CABG x 3 approximately 20 years ago. [facility] records available or recent ischemic evaluation per patient. · Acute CHF with unknown ejection fraction.  Remains mildly hypervolemic on exam.  proBNP 4300. · Acute hypoxic respiratory failure, on 3L NC.    · Right-sided pneumonia, tx with Atbs. · UTI.  +UC for Proteus.  Leukocytosis. · Pulmonary nodules noted on CTA.  Pulmonology consulted.   · LAMAR/CKD, Cr 1.7 today.    · Leukomalacia and small right frontal subdural hygroma on

## 2022-04-15 NOTE — PLAN OF CARE
Problem: Skin Integrity:  Goal: Will show no infection signs and symptoms  Description: Will show no infection signs and symptoms  4/15/2022 0009 by Tess Tellez RN  Outcome: Met This Shift  4/14/2022 1517 by Philipp Goodson RN  Outcome: Met This Shift     Problem: Falls - Risk of:  Goal: Will remain free from falls  Description: Will remain free from falls  4/15/2022 0009 by Tess Tellez RN  Outcome: Met This Shift  4/14/2022 1517 by Philipp Goodson RN  Outcome: Met This Shift  Goal: Absence of physical injury  Description: Absence of physical injury  4/15/2022 0009 by Tess Tellez RN  Outcome: Met This Shift  4/14/2022 1517 by Philipp Goodson RN  Outcome: Met This Shift     Problem: Musculor/Skeletal Functional Status  Goal: Highest potential functional level  4/15/2022 0009 by Tess Tellez RN  Outcome: Met This Shift  4/14/2022 1517 by Phiilpp Goodson RN  Outcome: Met This Shift  Goal: Absence of falls  4/15/2022 0009 by Tess Tellez RN  Outcome: Met This Shift  4/14/2022 1517 by Philipp Goodson RN  Outcome: Met This Shift     Problem: OXYGENATION/RESPIRATORY FUNCTION  Goal: Patient will maintain patent airway  4/15/2022 0009 by Tess Tellez RN  Outcome: Met This Shift  4/14/2022 1517 by Philipp Goodson RN  Outcome: Met This Shift     Problem: HEMODYNAMIC STATUS  Goal: Patient has stable vital signs and fluid balance  4/15/2022 0009 by Tess Tellez RN  Outcome: Met This Shift  4/14/2022 1517 by Philipp Goodson RN  Outcome: Met This Shift     Problem: Pain:  Goal: Pain level will decrease  Description: Pain level will decrease  4/15/2022 0009 by Tess Tellez RN  Outcome: Met This Shift  4/14/2022 1517 by Philipp Goodson RN  Outcome: Met This Shift  Goal: Control of acute pain  Description: Control of acute pain  Outcome: Met This Shift  Goal: Control of chronic pain  Description: Control of chronic pain  Outcome: Met This Shift

## 2022-04-15 NOTE — PLAN OF CARE
Problem: Skin Integrity:  Goal: Will show no infection signs and symptoms  Description: Will show no infection signs and symptoms  4/32/9662 5296 by Librado Wilkes RN  Outcome: Ongoing  4/15/2022 0009 by Benny Reece RN  Outcome: Met This Shift  Goal: Absence of new skin breakdown  Description: Absence of new skin breakdown  9/94/1226 4347 by Librado Wilkes RN  Outcome: Ongoing  4/15/2022 0009 by Benny Reece RN  Outcome: Met This Shift     Problem: Falls - Risk of:  Goal: Will remain free from falls  Description: Will remain free from falls  9/86/5079 8396 by Librado Wilkes RN  Outcome: Ongoing  4/15/2022 0009 by Benny Reece RN  Outcome: Met This Shift  Goal: Absence of physical injury  Description: Absence of physical injury  2/09/1526 5207 by Librado Wilkes RN  Outcome: Ongoing  4/15/2022 0009 by Benny Reece RN  Outcome: Met This Shift     Problem: Musculor/Skeletal Functional Status  Goal: Highest potential functional level  4/19/8918 0848 by Librado Wilkes RN  Outcome: Ongoing  4/15/2022 0009 by Benny Reece RN  Outcome: Met This Shift  Goal: Absence of falls  7/67/5058 1179 by Librado Wilkes RN  Outcome: Ongoing  4/15/2022 0009 by Benny Reece RN  Outcome: Met This Shift     Problem: OXYGENATION/RESPIRATORY FUNCTION  Goal: Patient will maintain patent airway  3/66/5645 0580 by Librado Wilkes RN  Outcome: Ongoing  4/15/2022 0009 by Benny Reece RN  Outcome: Met This Shift     Problem: HEMODYNAMIC STATUS  Goal: Patient has stable vital signs and fluid balance  2/69/6373 3231 by Librado Wilkes RN  Outcome: Ongoing  4/15/2022 0009 by Benny Reece RN  Outcome: Met This Shift     Problem: Pain:  Goal: Pain level will decrease  Description: Pain level will decrease  1/16/4540 5022 by Librado Wilkes RN  Outcome: Ongoing  4/15/2022 0009 by Benny Reece RN  Outcome: Met This Shift  Goal: Control of acute pain  Description: Control of acute pain  8/60/7847 1056 by Andrei Chávez Purvi Mcdonald RN  Outcome: Ongoing  4/15/2022 0009 by Clotilde Ray RN  Outcome: Met This Shift  Goal: Control of chronic pain  Description: Control of chronic pain  9/42/5767 9744 by Trudy Moses RN  Outcome: Ongoing  4/15/2022 0009 by Clotilde Ray RN  Outcome: Met This Shift
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
## 2022-04-15 NOTE — PROGRESS NOTES
Hospitalist Progress Note      Synopsis: Patient admitted for acute hypoxic respiratory failure 2/2 PNA and decompensated HF. Patient presents to the ED per the direction of the [place] with concerns of heart failure after presenting there with shortness of breath.   On arrival he was hypoxic at 88% on room air. Patient had a episode of atrial flutter with RVR in the ED for which he was given Cardizem and digoxin. Laboratory work-up significant for elevated BNP 3513, leukocytosis. CT chest shows right upper and right middle lobe pulmonary nodules as well as right middle lobe lingular and bilateral lower lobe infiltrates.  Urinalysis positive for infection.  Patient was given Lasix and started on doxycycline and cefepime. Palliative care was consulted for goals of care discussion. Patient would like to remain a full code. Cardiology was consulted for decompensated CHF. He is being diuresed and underwent successful DCCV on . He was placed on amiodarone drip which has now been transitioned to p.o. Therapeutic Lovenox has been transitioned to p.o. Eliquis. Pulmonology is also following given his continued oxygen needs. Neurosurgery was consulted for evaluation of incidental finding of a right frontal small hygroma who does not feel the patient requires any further treatment or follow-up. Sputum is growing gram-negative rods therefore antibiotic was switched to Zosyn. Speech eval revealed clinical indicators mild oral phase dysphagia and questionable pharyngeal phase dysphagia. Creatinine up to 1.7 this morning. Hospital day 7     Subjective:  Stable overnight. No issues reported. Patient was not examined as he was off the floor for testing on my attempts to examine  Records reviewed. Temp (24hrs), Av °F (36.1 °C), Min:96.4 °F (35.8 °C), Max:97.6 °F (36.4 °C)    DIET: ADULT DIET; Regular;  Low Sodium (2 gm)  CODE: Full Code    Intake/Output Summary (Last 24 hours) at 4/15/2022 0874  Last data filed at 4/15/2022 5604  Gross per 24 hour   Intake 560 ml   Output 550 ml   Net 10 ml         Objective:    BP 93/63   Pulse 74   Temp 96.4 °F (35.8 °C) (Temporal)   Resp 20   Ht 6' 1\" (1.854 m)   Wt 192 lb 12.8 oz (87.5 kg)   SpO2 93%   BMI 25.44 kg/m²     Medications:  REVIEWED DAILY    Infusion Medications    sodium chloride      dextrose       Scheduled Medications    piperacillin-tazobactam  3,375 mg IntraVENous Q8H    lactobacillus  2 capsule Oral TID    torsemide  40 mg Oral Daily    apixaban  5 mg Oral BID    metoprolol succinate  50 mg Oral BID    amiodarone  400 mg Oral BID    Followed by   Rustam Buenrostro ON 4/24/2022] amiodarone  200 mg Oral Daily    ipratropium-albuterol  1 ampule Inhalation TID    atorvastatin  40 mg Oral Daily    finasteride  5 mg Oral Daily    niacin  500 mg Oral Nightly    tamsulosin  0.4 mg Oral Daily    sodium chloride flush  5-40 mL IntraVENous 2 times per day    insulin lispro  0-6 Units SubCUTAneous TID WC    insulin lispro  0-3 Units SubCUTAneous Nightly     PRN Meds: acetaminophen **OR** acetaminophen, perflutren lipid microspheres, sodium chloride flush, sodium chloride, polyethylene glycol, glucose, dextrose, glucagon (rDNA), dextrose    Labs:     Recent Labs     04/13/22  0635 04/14/22  0716 04/15/22  0711   WBC 14.2* 16.6* 18.4*   HGB 11.5* 12.5 13.1   HCT 36.5* 39.0 42.4   * 111* 109*       Recent Labs     04/13/22  0635 04/14/22  0716 04/15/22  0711    135 134   K 4.0 4.2 4.2    100 98   CO2 24 24 24   BUN 30* 31* 38*   CREATININE 1.3* 1.3* 1.7*   CALCIUM 8.9 9.3 9.3       Recent Labs     04/15/22  0711   PROT 5.5*   ALKPHOS 101   ALT 36   AST 39   BILITOT 1.0       No results for input(s): INR in the last 72 hours. No results for input(s): [place] in the last 72 hours.     Chronic labs:    Lab Results   Component Value Date    CHOL 93 04/09/2022    TRIG 81 04/09/2022    HDL 33 04/09/2022    LDLCALC 44 04/09/2022 TSH 1.100 04/10/2022    INR 1.2 05/16/2019    LABA1C 7.2 07/06/2021       Radiology: REVIEWED DAILY    Assessment:  Acute hypoxic respiratory failure  HFpEF EF 50-55%   A. fib RVR status post DCCV anticoagulated on Eliquis  CAP, GNR - likely aspiration   Mild oral phase dysphagia and questionable pharyngeal phase dysphagia. Bronchiectasis  Mediastinal and hilar LAD  Proteus mirabilis UTI  CAD  DM2, A1c 7.2  History of CLL  Thrombocytopenia  LAMAR on CKD stage III   History of urinary retention   Thrombocytopenia   Hepatic steatosis     Plan:  Cardiology and pulmonology following  Wean oxygen as tolerated, currently requiring 3 L nasal cannula  Continue duo nebs, incentive spirometry, and flutter device  Sputum growing gram-negative rods, antibiotic switched to Zosyn  For modified barium swallow  Switch to p.o. turosemide this a.m. per cardiology  Monitor renal function, I&O -creatinine up to 1.7 this morning  Obtain urine studies   Will c/s nephrology   Recommend outpatient repeat CT chest in 2 to 4 weeks to follow-up on lymphadenopathy    DVT Prophylaxis [] Lovenox  []  Heparin [x] DOAC [] PCDs [] Ambulation    GI Prophylaxis [] PPI  [] H2 Blocker   [] Carafate  [x] Diet/Tube Feeds   Level of care [] Med/Surg  [x] Intermediate  []  ICU   Diet ADULT DIET; Regular; Low Sodium (2 gm)    Family contact [x]  N/A - pt A&O    [] At bedside  [] Phone call     Discharge Plan: Shalom Lopez when stable    +++++++++++++++++++++++++++++++++++++++++++++++++  Anand [facility], [address]  +++++++++++++++++++++++++++++++++++++++++++++++++  NOTE: This report was transcribed using voice recognition software. Every effort was made to ensure accuracy; however, inadvertent computerized transcription errors may be present.

## 2022-04-15 NOTE — CARE COORDINATION
Updated liaison Roxana, new nephrology consult, renal us, pvr, no discharge today, and mbs pending, per Roxana pre-cert is only good through Saturday, can not take Sunday, placed on therapy weekend list.

## 2022-04-15 NOTE — PROGRESS NOTES
[facility]  [address] [facility], [address], [address] [facility]      Pulmonary Progress Note      SUBJECTIVE:  Claims stable with breathing, still on 3 li NC, sitting up in bed, claims much less with cough and sputum production though still ronchorous when coughs, possible need for swallowing evaluation to evaluate dysphagia.     OBJECTIVE    Medications    Continuous Infusions:   sodium chloride      dextrose         Scheduled Meds:   piperacillin-tazobactam  3,375 mg IntraVENous Q8H    lactobacillus  2 capsule Oral TID    torsemide  40 mg Oral Daily    apixaban  5 mg Oral BID    metoprolol succinate  50 mg Oral BID    amiodarone  400 mg Oral BID    Followed by   Margarito Leaks ON 2022] amiodarone  200 mg Oral Daily    ipratropium-albuterol  1 ampule Inhalation TID    atorvastatin  40 mg Oral Daily    finasteride  5 mg Oral Daily    niacin  500 mg Oral Nightly    tamsulosin  0.4 mg Oral Daily    sodium chloride flush  5-40 mL IntraVENous 2 times per day    insulin lispro  0-6 Units SubCUTAneous TID WC    insulin lispro  0-3 Units SubCUTAneous Nightly       PRN Meds:acetaminophen **OR** acetaminophen, perflutren lipid microspheres, sodium chloride flush, sodium chloride, polyethylene glycol, glucose, dextrose, glucagon (rDNA), dextrose    Physical    VITALS:  BP (!) 107/51   Pulse 76   Temp 96.4 °F (35.8 °C) (Temporal)   Resp 20   Ht 6' 1\" (1.854 m)   Wt 192 lb 12.8 oz (87.5 kg)   SpO2 93%   BMI 25.44 kg/m²     24HR INTAKE/OUTPUT:      Intake/Output Summary (Last 24 hours) at 4/15/2022 1053  Last data filed at 4/15/2022 0745  Gross per 24 hour   Intake 1040 ml   Output 550 ml   Net 490 ml       24HR PULSE OXIMETRY RANGE:    SpO2  Av.5 %  Min: 91 %  Max: 95 %    General appearance: alert, appears stated age and cooperative  Lungs: rhonchi bilaterally with cough  Heart: regular rate and rhythm, S1, S2 normal, no murmur, click, rub or gallop  Abdomen: soft, non-tender; bowel sounds normal; no masses,  no organomegaly  Extremities: extremities normal, atraumatic, no cyanosis or edema  Neurologic: Mental status: Alert, oriented, thought content appropriate    Data    CBC:   Recent Labs     04/13/22  0635 04/14/22  0716 04/15/22  0711   WBC 14.2* 16.6* 18.4*   HGB 11.5* 12.5 13.1   HCT 36.5* 39.0 42.4   MCV 95.5 94.7 97.2   * 111* 109*       BMP:  Recent Labs     04/13/22  0635 04/14/22  0716 04/15/22  0711    135 134   K 4.0 4.2 4.2    100 98   CO2 24 24 24   BUN 30* 31* 38*   CREATININE 1.3* 1.3* 1.7*    ALB:3,BILIDIR:3,BILITOT:3,ALKPHOS:3)@    PT/INR: No results for input(s): PROTIME, INR in the last 72 hours. ABG:   No results for input(s): PH, PO2, PCO2, HCO3, BE, O2SAT, METHB, O2HB, COHB, O2CON, HHB, THB in the last 72 hours. Radiology/Other tests reviewed: CXR reviewed with slightly increased haziness right lower lung field and retrocardiac opacity compared to previous    Assessment:     Principal Problem:    Heart failure ([address]  Resolved Problems:    * No resolved hospital problems. *  bronchiectasis      Plan:       1. Cont with nebs and incentive spirometer/flutter device  2. Cont with oxygen, taper as tolerated  3. Nasal saline bid to see if helps with epistaxis, had been picking his nose yesterday  4. Watch fluid balance, diurese as per Cardiology  5. Repeat CT chest in 2-4 weeks to ffup on lymphadenopathy  6. Sputum culture showing pseudomonas, cont with antibiotics, should be able to give for about 1.5 weeks total  7. OOB to chair, ambulate as tolerated      Time at the bedside, reviewing labs and radiographs, reviewing notes and consultations, discussing with staff and family was more than 35 minutes. Thanks for letting us see this patient in consultation. Please contact us with any questions. Office [phone] or after hours through [employer], x [phone].

## 2022-04-16 NOTE — PLAN OF CARE
Problem: Skin Integrity:  Goal: Will show no infection signs and symptoms  Description: Will show no infection signs and symptoms  Outcome: Met This Shift  Goal: Absence of new skin breakdown  Description: Absence of new skin breakdown  Outcome: Met This Shift     Problem: Falls - Risk of:  Goal: Will remain free from falls  Description: Will remain free from falls  Outcome: Met This Shift  Goal: Absence of physical injury  Description: Absence of physical injury  Outcome: Met This Shift     Problem: Musculor/Skeletal Functional Status  Goal: Highest potential functional level  Outcome: Met This Shift  Goal: Absence of falls  Outcome: Met This Shift     Problem: OXYGENATION/RESPIRATORY FUNCTION  Goal: Patient will maintain patent airway  Outcome: Met This Shift     Problem: HEMODYNAMIC STATUS  Goal: Patient has stable vital signs and fluid balance  Outcome: Met This Shift     Problem: Pain:  Goal: Pain level will decrease  Description: Pain level will decrease  Outcome: Met This Shift  Goal: Control of acute pain  Description: Control of acute pain  Outcome: Met This Shift  Goal: Control of chronic pain  Description: Control of chronic pain  Outcome: Met This Shift

## 2022-04-16 NOTE — PROGRESS NOTES
Hospitalist Progress Note      Synopsis: Patient admitted for acute hypoxic respiratory failure 2/2 PNA and decompensated HF. Patient presents to the ED per the direction of the [place] with concerns of heart failure after presenting there with shortness of breath.   On arrival he was hypoxic at 88% on room air. Patient had a episode of atrial flutter with RVR in the ED for which he was given Cardizem and digoxin. Laboratory work-up significant for elevated BNP 3513, leukocytosis. CT chest shows right upper and right middle lobe pulmonary nodules as well as right middle lobe lingular and bilateral lower lobe infiltrates.  Urinalysis positive for infection.  Patient was given Lasix and started on doxycycline and cefepime. Palliative care was consulted for goals of care discussion. Patient would like to remain a full code. Cardiology was consulted for decompensated CHF. He is being diuresed and underwent successful DCCV on . He was placed on amiodarone drip which has now been transitioned to p.o. Therapeutic Lovenox has been transitioned to p.o. Eliquis. Pulmonology is also following given his continued oxygen needs. Neurosurgery was consulted for evaluation of incidental finding of a right frontal small hygroma who does not feel the patient requires any further treatment or follow-up. Sputum culture grew Pseudomonas. He is currently being treated with Zosyn. MBSS revealed moderate to severe pharyngeal phase dysphagia. Nephrology was consulted for worsening LAMAR. Renal ultrasound revealed bilateral hydronephrosis presumably secondary to urinary retention for which urology was consulted. Hospital day 8     Subjective:  Stable overnight. No issues reported. Patient seen and examined. States he could be better. He is depressed about his dysphagia diet. Records reviewed. Temp (24hrs), Av.8 °F (36 °C), Min:96.6 °F (35.9 °C), Max:97 °F (36.1 °C)    DIET: ADULT DIET; Dysphagia - Pureed;  Low Sodium (2 gm); Extremely Thick (Pudding)  ADULT ORAL NUTRITION SUPPLEMENT; Breakfast, Dinner; Fortified Pudding Oral Supplement  ADULT ORAL NUTRITION SUPPLEMENT; Lunch; Frozen Oral Supplement  CODE: Full Code    Intake/Output Summary (Last 24 hours) at 4/16/2022 0859  Last data filed at 4/16/2022 0547  Gross per 24 hour   Intake 300 ml   Output 1550 ml   Net -1250 ml     Review of Systems:     All bolded are positive; please see HPI  General:  Fever, chills, diaphoresis, fatigue, malaise, night sweats, weight loss  Psychological:  Anxiety, disorientation, hallucinations, depression  ENT:  Epistaxis, headaches, vertigo, visual changes. Cardiovascular:  Chest pain, irregular heartbeats, palpitations, paroxysmal nocturnal dyspnea. Respiratory:  Shortness of breath, coughing, sputum production, hemoptysis, wheezing, orthopnea. Gastrointestinal:  Nausea, vomiting, diarrhea, heartburn, constipation, abdominal pain, hematemesis, hematochezia, melena, acholic stools  Genito-Urinary:  Dysuria, urgency, frequency, hematuria  Musculoskeletal:  Joint pain, joint stiffness, joint swelling, muscle pain  Neurology:  Headache, focal neurological deficits, weakness, numbness, paresthesia  Derm:  Rashes, ulcers, excoriations, bruising  Extremities:  Decreased ROM, peripheral edema, mottling    Objective:    BP (!) 99/51   Pulse 66   Temp 96.7 °F (35.9 °C) (Temporal)   Resp 18   Ht 6' 1\" (1.854 m)   Wt 193 lb 3.2 oz (87.6 kg)   SpO2 93%   BMI 25.49 kg/m²     General appearance: Elderly male with flat affect though in no apparent distress, appears stated age and cooperative. HEENT: Conjunctivae/corneas clear. Mucous membranes moist.  Neck: Supple. No JVD. Respiratory:  Clear to auscultation bilaterally. Normal respiratory effort. Cardiovascular:  RRR. S1, S2 without MRG. PV: Pulses palpable. +1 pitting edema of the BLE. Abdomen: Soft, non-tender, non-distended. +BS  Musculoskeletal: No obvious deformities.    Skin: Normal skin color. No rashes or lesions. Good turgor. Neurologic:  Grossly non-focal. Awake, alert, following commands. Psychiatric: Alert and oriented, flat affect. Medications:  REVIEWED DAILY    Infusion Medications    sodium chloride      dextrose       Scheduled Medications    sodium chloride  1 spray Each Nostril BID    piperacillin-tazobactam  3,375 mg IntraVENous Q8H    lactobacillus  2 capsule Oral TID    torsemide  40 mg Oral Daily    apixaban  5 mg Oral BID    metoprolol succinate  50 mg Oral BID    amiodarone  400 mg Oral BID    Followed by   Lorie Ferro ON 4/24/2022] amiodarone  200 mg Oral Daily    ipratropium-albuterol  1 ampule Inhalation TID    atorvastatin  40 mg Oral Daily    finasteride  5 mg Oral Daily    niacin  500 mg Oral Nightly    tamsulosin  0.4 mg Oral Daily    sodium chloride flush  5-40 mL IntraVENous 2 times per day    insulin lispro  0-6 Units SubCUTAneous TID WC    insulin lispro  0-3 Units SubCUTAneous Nightly     PRN Meds: white petrolatum, acetaminophen **OR** acetaminophen, perflutren lipid microspheres, sodium chloride flush, sodium chloride, polyethylene glycol, glucose, dextrose, glucagon (rDNA), dextrose    Labs:     Recent Labs     04/14/22  0716 04/15/22  0711 04/16/22  0553   WBC 16.6* 18.4* 15.7*   HGB 12.5 13.1 12.5   HCT 39.0 42.4 39.3   * 109* 122*       Recent Labs     04/14/22  0716 04/15/22  0711 04/16/22  0553    134 138   K 4.2 4.2 4.4    98 99   CO2 24 24 26   BUN 31* 38* 42*   CREATININE 1.3* 1.7* 1.7*   CALCIUM 9.3 9.3 9.6   PHOS  --   --  3.8       Recent Labs     04/15/22  0711   PROT 5.5*   ALKPHOS 101   ALT 36   AST 39   BILITOT 1.0       No results for input(s): INR in the last 72 hours. No results for input(s): Zettie Binet in the last 72 hours.     Chronic labs:    Lab Results   Component Value Date    CHOL 93 04/09/2022    TRIG 81 04/09/2022    HDL 33 04/09/2022    LDLCALC 44 04/09/2022    TSH 1.100 04/10/2022    INR 1.2 05/16/2019    LABA1C 7.2 07/06/2021       Radiology: REVIEWED DAILY    Assessment:  Acute hypoxic respiratory failure  HFpEF EF 50-55%   LAMAR on CKD stage III   Bilateral hydronephrosis secondary to urinary retention  A. fib RVR status post DCCV anticoagulated on Eliquis  CAP-Pseudomonas  Moderate to severe pharyngeal phase dysphagia  Bronchiectasis  Mediastinal and hilar LAD   Proteus mirabilis UTI  CAD  DM2, A1c 7.2  History of CLL  Thrombocytopenia  History of urinary retention   Thrombocytopenia   Hepatic steatosis  Depression     Plan:  Cardiology, pulmonology, and nephrology  Wean oxygen as tolerated, currently requiring 3 L nasal cannula  Continue duo nebs, incentive spirometry, and flutter device  Sputum growing gram-Pseudomonas, continue Zosyn   Puréed, pudding thick diet per SLP recs  Diuresis per cardiology  Monitor renal function, I&O  Biswas inserted, awaiting urology c/s  Initiated sertraline   Recommend outpatient repeat CT chest in 2 to 4 weeks to follow-up on lymphadenopathy    DVT Prophylaxis [] Lovenox  []  Heparin [x] DOAC [] PCDs [] Ambulation    GI Prophylaxis [] PPI  [] H2 Blocker   [] Carafate  [x] Diet/Tube Feeds   Level of care [] Med/Surg  [x] Intermediate  []  ICU   Diet ADULT DIET; Dysphagia - Pureed; Low Sodium (2 gm); Extremely Thick (Pudding)  ADULT ORAL NUTRITION SUPPLEMENT; Breakfast, Dinner; Fortified Pudding Oral Supplement  ADULT ORAL NUTRITION SUPPLEMENT; Lunch; Frozen Oral Supplement    Family contact [x]  N/A - pt A&O    [] At bedside  [] Phone call     Discharge Plan: Usha Puls when stable    +++++++++++++++++++++++++++++++++++++++++++++++++  Sandra Olmos, C/ Sudarshan Gax[place]ola , [place]  +++++++++++++++++++++++++++++++++++++++++++++++++  NOTE: This report was transcribed using voice recognition software.  Every effort was made to ensure accuracy; however, inadvertent computerized transcription errors may be present.

## 2022-04-16 NOTE — CONSULTS
EX- FIX REMOVAL , LEFT TIBIA OPEN REDUCTION INTERNAL FIXATION--SYNTHES performed by David Valadez MD at [facility]         Medications Prior to Admission:    Medications Prior to Admission: guaiFENesin 200 MG tablet, Take 400 mg by mouth every 4 hours as needed for Congestion  loperamide (IMODIUM) 2 MG capsule, Take 2 mg by mouth 2 times daily as needed for Diarrhea  bisacodyl (DULCOLAX) 5 MG EC tablet, Take 5 mg by mouth 2 times daily as needed for Constipation  magnesium hydroxide (MILK OF MAGNESIA) 400 MG/5ML suspension, Take 15 mLs by mouth daily  Multiple Vitamins-Minerals (MULTIVITAMINS/MINERALS ADULT PO), Take 1 tablet by mouth daily  Cholecalciferol 50 MCG (2000 UT) TABS, Take 2,000 Units by mouth daily  furosemide (LASIX) 20 MG tablet, Take 20 mg by mouth 2 times daily  polyvinyl alcohol (LIQUIFILM TEARS) 1.4 % ophthalmic solution, Place 1 drop into both eyes 4 times daily   aspirin 81 MG EC tablet, Take 81 mg by mouth daily   niacin 500 MG extended release capsule, Take 500 mg by mouth nightly  tamsulosin (FLOMAX) 0.4 MG capsule, Take 0.4 mg by mouth 2 times daily   atorvastatin (LIPITOR) 80 MG tablet, Take 40 mg by mouth daily   finasteride (PROSCAR) 5 MG tablet, Take 5 mg by mouth daily   metoprolol (LOPRESSOR) 25 MG tablet, Take 1 tablet by mouth 2 times daily. Allergies:    Patient has no known allergies. Social History:    reports that he quit smoking about 20 years ago. He has never used smokeless tobacco. He reports that he does not drink alcohol and does not use drugs. Family History:   Non-contributory to this Urological problem  family history includes Heart Disease in his father and mother.     REVIEW OF SYSTEMS:  Hard of hearing  Respiratory: cough  Cardiovascular: negative for chest pain and dyspnea  Gastrointestinal: negative for abdominal pain, diarrhea, nausea and vomiting  Derm: negative for rash and skin lesion(s)  Neurological: negative for seizures and tremors  Endocrine: negative for diabetic symptoms including polydipsia and polyuria  : As above in the HPI, otherwise negative  All other systems negative    PHYSICAL EXAM:    Vitals:  BP (!) 93/52   Pulse 67   Temp 96.7 °F (35.9 °C) (Temporal)   Resp 18   Ht 6' 1\" (1.854 m)   Wt 193 lb 3.2 oz (87.6 kg)   SpO2 93%   BMI 25.49 kg/m²     General:  Awake, alert, oriented X 3. Well developed, poorly nourished, poorly groomed. No apparent distress. HEENT:  Normocephalic, atraumatic. Pupils equal, round. No scleral icterus. No conjunctival injection. Normal lips, teeth, and gums. No nasal discharge. Neck:  Supple, no masses. Heart:  RRR  Lungs:  No audible wheezing. Respirations symmetric and non-labored. Abdomen:  soft, nontender, no masses, no organomegaly, no peritoneal signs  Extremities:  No clubbing, cyanosis, or edema  Skin:  Warm and dry, pale  Neuro:  Cranial nerves 2-12 intact, no focal deficits  Rectal: deferred  Genitalia: Urethral erosion, blood at the meatus, indwelling Biswas catheter    LABS:    Lab Results   Component Value Date    WBC 15.7 (H) 04/16/2022    HGB 12.5 04/16/2022    HCT 39.3 04/16/2022    MCV 94.9 04/16/2022     (L) 04/16/2022       Lab Results   Component Value Date    CREATININE 1.7 (H) 04/16/2022       No results found for: PSA    Lab Results   Component Value Date    LABURIN >100,000 CFU/ml 04/08/2022       Lab Results   Component Value Date    BC 5 Days no growth 04/08/2022       Lab Results   Component Value Date    BLOODCULT2 5 Days no growth 04/08/2022       ASSESSMENT / PLAN:      1. Chronic urinary retention. It is doubtful that the patient will adequately empty his bladder. In light of his severe urethral erosion it may be best for him to undergo insertion of suprapubic catheter. Once placed this can potentially be capped to determine if the patient is able to urinate by checking postvoid residuals.   However, it is likely that he will not be able to ideally urinate due to chronic bladder failure as well.       Quincy Jordan MD, MSANDER.  12:20 PM  4/16/2022

## 2022-04-16 NOTE — PROGRESS NOTES
Notified Mari NP regarding blood pressure and requesting hold parameters on medication. Orders received. Ok to hold metoprolol and torsemide. Ok to give amio and flomax.

## 2022-04-16 NOTE — PROGRESS NOTES
Cardiology Progress Note:     Narrative:  · 79 yo WM with PMH of coronary artery disease with a history of CABG x3 (Gayle LACEY), hypertension, hyperlipidemia, type 2 diabetes mellitus with peripheral neuropathy (wheelchair-bound), CKD, previous tobacco abuse, clinical deconditioning. · Presented to hospital after fall at nursing home, found to be in atrial flutter and acute heart failure. · Underwent DCCV to NSR --> now on PO amiodarone loading. Maintaining NSR today with PACs, HR in the 70s. · Patient notes of improvement in SOB since hospital admission. Denies CP, N/V, diaphoresis, dizziness, palpitations, near syncope or syncope. Denies PND/orthopnea. · Does admit to decreased urination today with LAMAR/CKD -- renal following for possible obstructive etiology.        Objective:  Lungs with diminished breath sounds with rhonchi bilaterally  Heart RRR with no M/G/R  Abdomen soft, non tender  Extremities with 1+ edema bilaterally  On 3L nasal canula with sats 91-97%  I/O (-) 2.1 liters since admission     GOVIND 4/12/22: no LILY clot        Assessment:  · Narrow complex tachycardia, likely atrial flutter with RVR.  HR in the 110s-120s today.    ? Onset unknown.  CHADsVASc score at least 4 (HTN, age, CAD). ? On Eliquis  ? Maintaining NSR s/p GOVIND-guided DCCV   · Elevated troponin, pattern not consistent with ACS.    ? Likely secondary to demand ischemia given tachycardia and LAMAR/CKD.  Nonspecific ST-T wave changes on EKG.    · CAD s/p reported CABG x 3 approximately 20 years ago. [facility] records available or recent ischemic evaluation per patient. · Acute CHF with unknown ejection fraction.  Remains mildly hypervolemic on exam.  proBNP 4300. · Acute hypoxic respiratory failure, on 3L NC.    · Right-sided pneumonia, tx with Atbs. · UTI.  +UC for Proteus.  Leukocytosis. · Pulmonary nodules noted on CTA.  Pulmonology consulted.   · LAMAR/CKD, Cr 1.7 today.    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 · HTN, controlled. · HLD, on statin therapy. · Previous tobacco abuse. · Chronic thrombocytopenia.  Stable. · T2DM, with peripheral neuropathy. · Clinical deconditioning.  Wheelchair bound.  Palliative care following -- full code.           Recommendations:  · Continue PO diuresis. Nephrology now following. · Continue PO amiodarone loading and other cardiac medications the same at this time. · Will follow peripherally over the weekend.          Discussed with Dr. Nicole Cain, [facility]    PHYSICIAN ADDENDUM:  I independently reviewed the above documentation and made amendments as needed. I formulated the assessment and plan with the JERAD with my contribution being >50%.  Plan discussed with the patient/family/care team.      Patricia Pope MD, [facility]  Interventional Cardiology/Structural Heart Disease  Office: [phone]  Coordinator: Juan Daniel
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
## 2022-04-16 NOTE — PROGRESS NOTES
[facility] MD Pricila Michael MD Eden Corti, MD Cesar Coil, MD Jono Koenig, IDANIA Forde, ALINA  Progress Note    4/16/2022    SUBJECTIVE:   4/16: High residual last evening, Biswas catheter placed. Appreciate urology involvement. Ongoing fatigue and malaise with generalized weakness. Swelling seems little bit better today. Denies dyspnea at rest on nasal cannula. Ongoing anorexia and poor appetite. Now switched to dysphagia diet      PROBLEM LIST:    Principal Problem:    Heart failure ([address]  Resolved Problems:    * No resolved hospital problems. *         DIET:    ADULT DIET; Dysphagia - Pureed; Low Sodium (2 gm);  Extremely Thick (Pudding)  ADULT ORAL NUTRITION SUPPLEMENT; Breakfast, Dinner; Fortified Pudding Oral Supplement  ADULT ORAL NUTRITION SUPPLEMENT; Lunch; Frozen Oral Supplement     MEDS (scheduled):    sertraline  25 mg Oral Daily    sodium chloride  1 spray Each Nostril BID    piperacillin-tazobactam  3,375 mg IntraVENous Q8H    lactobacillus  2 capsule Oral TID    torsemide  40 mg Oral Daily    apixaban  5 mg Oral BID    metoprolol succinate  50 mg Oral BID    amiodarone  400 mg Oral BID    Followed by   Danilo Adamson ON 4/24/2022] amiodarone  200 mg Oral Daily    ipratropium-albuterol  1 ampule Inhalation TID    atorvastatin  40 mg Oral Daily    finasteride  5 mg Oral Daily    niacin  500 mg Oral Nightly    tamsulosin  0.4 mg Oral Daily    sodium chloride flush  5-40 mL IntraVENous 2 times per day    insulin lispro  0-6 Units SubCUTAneous TID WC    insulin lispro  0-3 Units SubCUTAneous Nightly       MEDS (infusions):   sodium chloride      dextrose         MEDS (prn):  white petrolatum, acetaminophen **OR** acetaminophen, perflutren lipid microspheres, sodium chloride flush, sodium chloride, polyethylene glycol, glucose, dextrose, glucagon (rDNA), dextrose    PHYSICAL EXAM:     Patient Vitals for the past 24 hrs:   BP Temp Temp src Pulse Resp SpO2 Weight   04/16/22 1515 (!) 103/54 96.6 °F (35.9 °C) Temporal 74 18 94 % --   04/16/22 1052 (!) 93/52 -- -- 67 -- -- --   04/16/22 0815 (!) 99/51 96.7 °F (35.9 °C) Temporal 66 18 93 % --   04/16/22 0801 -- -- -- -- -- 94 % --   04/16/22 0400 -- -- -- -- -- -- 193 lb 3.2 oz (87.6 kg)   04/16/22 0002 (!) 114/59 97 °F (36.1 °C) Temporal 71 15 94 % --   04/15/22 2051 -- -- -- -- 20 94 % --   04/15/22 2030 (!) 118/59 96.6 °F (35.9 °C) Temporal 75 20 93 % --   @      Intake/Output Summary (Last 24 hours) at 4/16/2022 1552  Last data filed at 4/16/2022 1515  Gross per 24 hour   Intake 360 ml   Output 1925 ml   Net -1565 ml         Wt Readings from Last 3 Encounters:   04/16/22 193 lb 3.2 oz (87.6 kg)   06/18/21 189 lb (85.7 kg)   12/21/20 202 lb (91.6 kg)       Constitutional:  in no acute distress  HEENT: NC/AT, EOMI, sclera and conjunctiva are clear and anicteric, mucus membranes moist  Neck: Trachea midline, no JVD  Cardiovascular: S1, S2 regular rhythm, no murmur,or rub  Respiratory:  No crackles, no wheeze  Gastrointestinal:  Soft, nontender, nondistended, NABS  Ext: no edema, feet warm  Skin: dry, no rash  Neuro: awake, alert, interactive      DATA:    Recent Labs     04/14/22  0716 04/15/22  0711 04/16/22  0553   WBC 16.6* 18.4* 15.7*   HGB 12.5 13.1 12.5   HCT 39.0 42.4 39.3   MCV 94.7 97.2 94.9   * 109* 122*     Recent Labs     04/14/22  0716 04/15/22  0711 04/16/22  0553    134 138   K 4.2 4.2 4.4    98 99   CO2 24 24 26   MG  --  2.0 2.0   PHOS  --   --  3.8   BUN 31* 38* 42*   CREATININE 1.3* 1.7* 1.7*   ALT  --  36  --    AST  --  39  --    BILITOT  --  1.0  --    ALKPHOS  --  101  --        No results found for: LABPROT    Assessment  1.  LAMAR, likely obstructive due to BPH, though given current ongoing diuresis need to consider decreased effective circulating volume as the culprit, particularly as he has not been eating or drinking much of anything in the last several days. Given treatment with 3 antimicrobials in the last 7 days, consider also AIN, though seems less likely than the other 2 possibilities. 2. Chronic kidney disease, multifactorial principally due to renal microvascular atherosclerotic disease. Baseline creatinine 1.2 to 1.4 mg/dL. Renal ultrasound demonstrates bilateral hydronephrosis  Urine studies again to be sent  Clinically stable    Recommendations  1. Agree: Biswas  1.5 hold the Demadex (eating drinking hardly anything)  2. Check urine protein creatinine ratio  3. Check random urine electrolytes and creatinine  4. Check urine eosinophils  5. Renal ultrasound rule out hydronephrosis/other obstruction  6. Continue supportive care  7.   Follow labs, UO      Electronically signed by Eddi Nettles MD on 4/16/2022 at 3:52 PM

## 2022-04-17 NOTE — PROGRESS NOTES
Hospitalist Progress Note      Synopsis: Patient admitted for acute hypoxic respiratory failure 2/2 PNA and decompensated HF. Patient presents to the ED per the direction of the [place] with concerns of heart failure after presenting there with shortness of breath.   On arrival he was hypoxic at 88% on room air. Patient had a episode of atrial flutter with RVR in the ED for which he was given Cardizem and digoxin. Laboratory work-up significant for elevated BNP 3513, leukocytosis. CT chest shows right upper and right middle lobe pulmonary nodules as well as right middle lobe lingular and bilateral lower lobe infiltrates.  Urinalysis positive for infection.  Patient was given Lasix and started on doxycycline and cefepime. Palliative care was consulted for goals of care discussion. Patient would like to remain a full code. Cardiology was consulted for decompensated CHF. He is being diuresed and underwent successful DCCV on . He was placed on amiodarone drip which has now been transitioned to p.o. Therapeutic Lovenox has been transitioned to p.o. Eliquis. Pulmonology is also following given his continued oxygen needs. Neurosurgery was consulted for evaluation of incidental finding of a right frontal small hygroma who does not feel the patient requires any further treatment or follow-up. Sputum culture grew Pseudomonas. He is currently being treated with Zosyn. MBSS revealed moderate to severe pharyngeal phase dysphagia. Nephrology was consulted for worsening LAMAR. Renal ultrasound revealed bilateral hydronephrosis presumably secondary to urinary retention for which urology was consulted and considering a suprapubic catheter placement given urethral erosion. Hospital day 9     Subjective:  Stable overnight. No issues reported. Patient seen and examined. States he's the doing the best he can. No new physical complaints. Records reviewed.      Temp (24hrs), Av.5 °F (36.4 °C), Min:96.6 °F (35.9 °C), Max:98 °F (36.7 °C)    DIET: ADULT DIET; Dysphagia - Pureed; Low Sodium (2 gm); Extremely Thick (Pudding)  ADULT ORAL NUTRITION SUPPLEMENT; Breakfast, Dinner; Fortified Pudding Oral Supplement  ADULT ORAL NUTRITION SUPPLEMENT; Lunch; Frozen Oral Supplement  CODE: Full Code    Intake/Output Summary (Last 24 hours) at 4/17/2022 0817  Last data filed at 4/17/2022 0504  Gross per 24 hour   Intake 360 ml   Output 1025 ml   Net -665 ml     Review of Systems:     All bolded are positive; please see HPI  General:  Fever, chills, diaphoresis, fatigue, malaise, night sweats, weight loss  Psychological:  Anxiety, disorientation, hallucinations, depression  ENT:  Epistaxis, headaches, vertigo, visual changes. Cardiovascular:  Chest pain, irregular heartbeats, palpitations, paroxysmal nocturnal dyspnea. Respiratory:  Shortness of breath, coughing, sputum production, hemoptysis, wheezing, orthopnea. Gastrointestinal:  Nausea, vomiting, diarrhea, heartburn, constipation, abdominal pain, hematemesis, hematochezia, melena, acholic stools  Genito-Urinary:  Dysuria, urgency, frequency, hematuria  Musculoskeletal:  Joint pain, joint stiffness, joint swelling, muscle pain  Neurology:  Headache, focal neurological deficits, weakness, numbness, paresthesia  Derm:  Rashes, ulcers, excoriations, bruising  Extremities:  Decreased ROM, peripheral edema, mottling    Objective:    BP (!) 113/47   Pulse 83   Temp 98 °F (36.7 °C) (Oral)   Resp 18   Ht 6' 1\" (1.854 m)   Wt 191 lb 3.2 oz (86.7 kg)   SpO2 100%   BMI 25.23 kg/m²     General appearance: Elderly male with flat affect though in no apparent distress, appears stated age and cooperative. HEENT: Conjunctivae/corneas clear. Mucous membranes moist.  Neck: Supple. No JVD. Respiratory:  Clear to auscultation bilaterally. Normal respiratory effort. Cardiovascular:  RRR. S1, S2 without MRG. PV: Pulses palpable. +1 pitting edema of the BLE.    Abdomen: Soft, non-tender, non-distended. +BS  Musculoskeletal: No obvious deformities. Skin: Normal skin color. No rashes or lesions. Good turgor. Neurologic:  Grossly non-focal. Awake, alert, following commands. Psychiatric: Alert and oriented, flat affect. Medications:  REVIEWED DAILY    Infusion Medications    sodium chloride      dextrose       Scheduled Medications    sertraline  25 mg Oral Daily    sodium chloride  1 spray Each Nostril BID    piperacillin-tazobactam  3,375 mg IntraVENous Q8H    lactobacillus  2 capsule Oral TID    apixaban  5 mg Oral BID    metoprolol succinate  50 mg Oral BID    amiodarone  400 mg Oral BID    Followed by   John Angel ON 4/24/2022] amiodarone  200 mg Oral Daily    ipratropium-albuterol  1 ampule Inhalation TID    atorvastatin  40 mg Oral Daily    finasteride  5 mg Oral Daily    niacin  500 mg Oral Nightly    tamsulosin  0.4 mg Oral Daily    sodium chloride flush  5-40 mL IntraVENous 2 times per day    insulin lispro  0-6 Units SubCUTAneous TID WC    insulin lispro  0-3 Units SubCUTAneous Nightly     PRN Meds: white petrolatum, acetaminophen **OR** acetaminophen, perflutren lipid microspheres, sodium chloride flush, sodium chloride, polyethylene glycol, glucose, dextrose, glucagon (rDNA), dextrose    Labs:     Recent Labs     04/15/22  0711 04/16/22  0553 04/17/22  0542   WBC 18.4* 15.7* 17.9*   HGB 13.1 12.5 11.6*   HCT 42.4 39.3 36.3*   * 122* 129*       Recent Labs     04/15/22  0711 04/16/22  0553    138   K 4.2 4.4   CL 98 99   CO2 24 26   BUN 38* 42*   CREATININE 1.7* 1.7*   CALCIUM 9.3 9.6   PHOS  --  3.8       Recent Labs     04/15/22  0711   PROT 5.5*   ALKPHOS 101   ALT 36   AST 39   BILITOT 1.0       No results for input(s): INR in the last 72 hours. No results for input(s): Faraz Chavira in the last 72 hours.     Chronic labs:    Lab Results   Component Value Date    CHOL 93 04/09/2022    TRIG 81 04/09/2022    HDL 33 04/09/2022    LDLCALC 44 04/09/2022    TSH 1.100 04/10/2022    INR 1.2 05/16/2019    LABA1C 7.2 07/06/2021       Radiology: REVIEWED DAILY    Assessment:  Acute hypoxic respiratory failure  HFpEF EF 50-55%   LAMAR on CKD stage III, baseline cr 1.2-1.4  Bilateral hydronephrosis secondary to urinary retention  A. fib RVR status post DCCV anticoagulated on Eliquis  CAP-Pseudomonas  Moderate to severe pharyngeal phase dysphagia  Bronchiectasis  Mediastinal and hilar LAD   Proteus mirabilis UTI  CAD  DM2, A1c 7.2  History of CLL  Thrombocytopenia  Chronic urinary retention  Thrombocytopenia   Hepatic steatosis  Depression     Plan:  Cardiology, pulmonology, nephrology, and urology following  Wean oxygen as tolerated, currently on 3L though SpO2 100%  Continue duo nebs, incentive spirometry, and flutter device  Sputum growing gram - Pseudomonas, continue Zosyn   Puréed, pudding thick diet per SLP recs  Diuresis held per nephrology  Monitor renal function, I&O  Encourage PO intake  Indwelling thrasher in place now, urology considering SP catheter given urethral erosion  Increase ISS, add lantus  Recommend outpatient repeat CT chest in 2 to 4 weeks to follow-up on lymphadenopathy    DVT Prophylaxis [] Lovenox  []  Heparin [x] DOAC [] PCDs [] Ambulation    GI Prophylaxis [] PPI  [] H2 Blocker   [] Carafate  [x] Diet/Tube Feeds   Level of care [] Med/Surg  [x] Intermediate  []  ICU   Diet ADULT DIET; Dysphagia - Pureed; Low Sodium (2 gm);  Extremely Thick (Pudding)  ADULT ORAL NUTRITION SUPPLEMENT; Breakfast, Dinner; Fortified Pudding Oral Supplement  ADULT ORAL NUTRITION SUPPLEMENT; Lunch; Frozen Oral Supplement    Family contact [x]  N/A - pt A&O    [] At bedside  [] Phone call     Discharge Plan: [facility] when stable    +++++++++++++++++++++++++++++++++++++++++++++++++  SU[employer]/ Sudarshan [address]  +++++++++++++++++++++++++++++++++++++++++++++++++  NOTE: This report was transcribed using voice recognition software. Every effort was made to ensure accuracy; however, inadvertent computerized transcription errors may be present.

## 2022-04-17 NOTE — PROGRESS NOTES
Occupational Therapy  OT BEDSIDE TREATMENT NOTE   [address] [place] [address]  [address], [place]       Date:2022  Patient Name: Esther Berger  MRN: [id number]  : 1934  Room: [address] [place] [address]     Per OT Eval:    Evaluating OT: Amanda Sanchez, [address], OTR/L [id number]  Referring Doug Public, DO  Specific Provider Orders: OT eval and treat   Recommended Adaptive Equipment: 24 hr physical assist      Diagnosis: heart failure (s/p fall, no fxs found)   Surgery: none   Pertinent Medical History: CAD, CLL, HTN, HLD     Precautions:  Fall Risk, O2     Assessment of current deficits   [x]? Functional mobility           [x]? ADLs           [x]? Strength                  [x]? Cognition   [x]? Functional transfers         [x]? IADLs         [x]? Safety Awareness   [x]? Endurance   []? Fine Coordination                         [x]? Balance      []? Vision/perception   [x]? Sensation     []? Gross Motor Coordination             []? ROM           []?  Delirium                   []? Motor Control      OT PLAN OF CARE   OT POC based on physician orders, patient diagnosis and results of clinical assessment     Frequency/Duration: 2-3 days/wk for 2 weeks PRN   Specific OT Treatment to include:   * Instruction/training on adapted ADL techniques and AE recommendations to increase functional independence within precautions       * Training on energy conservation strategies, correct breathing pattern and techniques to improve independence/tolerance for self-care routine  * Functional transfer/mobility training/DME recommendations for increased independence, safety, and fall prevention  * Patient/Family education to increase follow through with safety techniques and functional independence  * Recommendation of environmental modifications for increased safety with functional transfers/mobility and ADLs  * Cognitive retraining/development of therapeutic activities to improve problem solving, judgement, memory, and attention for increased safety/participation in ADL/IADL tasks  * Therapeutic exercise to improve motor endurance, ROM, and functional strength for ADLs/functional transfers  * Therapeutic activities to facilitate/challenge dynamic balance, stand tolerance for increased safety and independence with ADLs  * Neuro-muscular re-education: facilitation of righting/equilibrium reactions, midline orientation, scapular stability/mobility, normalization of muscle tone, and facilitation of volitional active controled movement     Home Living: Pt presents from the [place]. He reports using electric w/c and SPTs.      Pain Level: Pt complained of stomach pain this session  Cognition: A&O: 3/4; Follows 1-2 step directions, pleasant & cooperative              Memory:  fair+              Sequencing:  fair+             Problem solving:  fair+             Judgement/safety:  fair+     Functional Assessment:  AM-PAC Daily Activity Raw Score: 14/24    Initial Eval Status  Date: 4/10/22 Treatment Status  Date:  4/17/22 STGs=LTGs  Time Frame: 10-14 days   Feeding SBA  SBA  SUP   Grooming SBA (seated for facial washing)  Min A  To wash face and apply deodorant seated EOB   SUP   UB Dressing Mod A (due to limited ROM)  Min A  [facility] gown seated EOB Min A   LB Dressing Max A (assist with socks) Dependent  [facility] socks Mod A    Bathing Max A (simulated) Max A  Seated EOB Mod A    Toileting Max A Max A- hygiene Mod A   Bed Mobility  Log roll: Mod A  Supine to sit: Mod A   Sit to supine: Mod A  Max A  Supine>EOB  Educated pt on technique to increase independence. Log roll CGA  Supine to sit: CGA   Sit to supine: CGA   Functional Transfers Sit to stand: Mod A   Stand to sit:Mod A  Commode: NT (RN notified to order [facility] for rm) Max A  Sit to Stand  Stand to Sit  Cuing for hand placement  Max A- stand pivot transfer using w/w CGA   Functional Mobility NT (pt too fearful) Max A  Less than home distance using w/w Min A if appropriate (pt reported not completing prior and only completed SPTs)   Balance Sitting: SBA  Standing: Min A  Sitting EOB:  CGA    Standing: Max A     Activity Tolerance fair Fair-     Visual/  Perceptual Glasses: yes                           Comments/Treatment/Education: Upon arrival pt supine in bed, agreeable to therapy. Pt educated on techniques to increase independence and safety during ADL's, bed mobility, and functional transfers. At end of session, pt left seated in bedside chair, all tubes and lines intact, call light within reach. · Pt has made slow progress towards set goals.    · Continue with current plan of care focusing on increasing of independency with transfers and ADL tasks      Treatment Time In: 10:27            Treatment Time Out: 10:50           Treatment Charges: Mins Units   Ther Ex  38781     Manual Therapy 98049     Thera Activities 68801 10 1   ADL/Home Mgt 62447 13 1   Neuro Re-ed 50730     Group Therapy      Orthotic manage/training  54258     Non-Billable Time     Total Timed Treatment 23 2       [address], [address]

## 2022-04-17 NOTE — PLAN OF CARE
Problem: Skin Integrity:  Goal: Will show no infection signs and symptoms  Description: Will show no infection signs and symptoms  Outcome: Ongoing  Goal: Absence of new skin breakdown  Description: Absence of new skin breakdown  Outcome: Ongoing     Problem: Falls - Risk of:  Goal: Will remain free from falls  Description: Will remain free from falls  Outcome: Ongoing  Goal: Absence of physical injury  Description: Absence of physical injury  Outcome: Ongoing     Problem: Musculor/Skeletal Functional Status  Goal: Highest potential functional level  Outcome: Ongoing  Goal: Absence of falls  Outcome: Ongoing     Problem: OXYGENATION/RESPIRATORY FUNCTION  Goal: Patient will maintain patent airway  Outcome: Ongoing     Problem: HEMODYNAMIC STATUS  Goal: Patient has stable vital signs and fluid balance  Outcome: Ongoing     Problem: Pain:  Goal: Pain level will decrease  Description: Pain level will decrease  Outcome: Ongoing  Goal: Control of acute pain  Description: Control of acute pain  Outcome: Ongoing  Goal: Control of chronic pain  Description: Control of chronic pain  Outcome: Ongoing

## 2022-04-17 NOTE — PROGRESS NOTES
Physical Therapy  Treatment Note    Name: Baldev Salgado  : 1934  MRN: [id number]      Date of Service: 2022    Evaluating PT:  Travis Zelaya PT, DPT [id number]    Room #:  8013/0965-J  Diagnosis:  Tachycardia [R00.0]  Heart failure ([facility] [I50.9]  Acute cystitis with hematuria [N30.01]  Pneumonia due to infectious organism, unspecified laterality, unspecified part of lung [J18.9]  Congestive heart failure, unspecified HF chronicity, unspecified heart failure type ([facility] [I50.9]  PMHx/PSHx:  Leukemia, HLD, HTN, CAD, CABG, cervical fusion  Procedure/Surgery:  GOVIND (2022); GOVIND and Cardioversion (2022)  Precautions:  Falls, external catheter, O2  Equipment Needs:  TBD  Equipment Owned: Manual wheelchair, electric wheelchair    SUBJECTIVE:    Pt from nursing facility. Pt non-ambulatory PTA; used manual wheelchair or electric wheelchair for mobility; performed stand pivot transfers to/from wheelchair independently. OBJECTIVE:   Initial Evaluation  Date: 2022 Treatment  22 Short Term/ Long Term   Goals   AM-PAC 6 Clicks 54/85     Was pt agreeable to Eval/treatment? Yes Yes    Does pt have pain? No c/o pain Mild abdomen    Bed Mobility  Rolling: NT  Supine to sit: ModA  Sit to supine: NT  Scooting: NT Rolling: mod A  Supine to sit: max A  Sit to supine: max A  Scooting: NT Rolling: Independent  Supine to sit: Independent  Sit to supine: Independent  Scooting: Independent   Transfers Sit to stand: ModA  Stand to sit: ModA  Stand pivot: ModA with no AD;  ModA with [employer] Sit to stand: max A  Stand to sit: mod A   Stand pivot: mod A with ww Sit to stand: Ryan  Stand to sit: Ryan  Stand pivot: Ryan with AAD   Ambulation    3 feet with no AD ModA; 3 feet with [employer] ModA 2' with ww  (steps to bedside chair) 3 feet with AAD Ryan   Wheelchair mobility  NT NT 50 feet using BUE Independent   ROM BUE:  See OT note  BLE:  WFL     Strength BUE:  See OT note  BLE:  Grossly 4+/5     Balance Sitting EOB: SBA  Dynamic Standing:  ModA with [employer] Sitting EOB:  SBA  Dynamic Standing: Mod A with ww Sitting EOB:  Independent  Dynamic Standing:  Ryan with AAD     Pt is A & O x 4  Sensation:  No reports of numbness/tingling to extremities  Edema:  Unremarkable    Patient education  Pt educated on safety during functional mobility. Patient response to education:   Pt verbalized understanding Pt demonstrated skill Pt requires further education in this area   yes yes reinforce       ASSESSMENT:    Comments:    Pt supine in bed upon entering, pt agreeable to participate. Pt instructed to log roll to assist with pt hygiene as he was incontinent of stool. Pt assisted with hygiene, log rolling bilaterally, cues for grab bar use as needed. Pt then assisted to EOB, completing transfer with assist of BLE and trunk. Pt sitting upright demonstrating good static sitting balance. Pt cued for hand palcement and instructed to stand from EOB, completing transfer with increased assistance and ww in place to steady pt. Pt demonstrated flexed trunk with difficulty correcting with cues, pt instructed to transfer to bedside chair. Cueing provided for positioning and hand placement for pt safety. Pt assisted with eccentric control. Pt remained in bedside chair with all needs met, chair alram on and call bell in reach prior to exiting.     Treatment:  Patient practiced and was instructed in the following treatment:     Bed mobility training - pt given verbal and tactile cues to facilitate proper sequencing and safety during rolling and supine>sit as well as provided with physical assistance to complete task    STS and pivot transfer training - pt educated on proper hand and foot placement, safety and sequencing, and use of verbal and tactile cues to safely complete sit<>stand and pivot transfers with physical assistance to complete task safely    Skilled positioning - Pt positioned in bedside chair, utilized to facilitate upright posture, joint and skin integrity, and interaction with environment. PLAN:    Patient is making good progress towards established goals. Will continue with current POC.       Time in  1027  Time out  1050    Total Treatment Time 23 minutes     CPT codes:  [] Gait training 06671 -- minutes  [] Manual therapy [id number] -- minutes  [x] Therapeutic activities 79375 23 minutes  [] Therapeutic exercises 36604 -- minutes  [] Neuromuscular reeducation 92337 -- minutes    Jarrett Lorenzo PT, DPT  [id number]

## 2022-04-17 NOTE — PROGRESS NOTES
[facility]  [address] [facility], [address], [address] [place]       Pulmonary Progress Note      SUBJECTIVE:  Similar on 3 li NC, sitting up in bed, claims doing ok with breathing and no change with minimal cough/sputum production    OBJECTIVE    Medications    Continuous Infusions:   sodium chloride      dextrose         Scheduled Meds:   insulin lispro  0-12 Units SubCUTAneous TID WC    insulin lispro  0-6 Units SubCUTAneous Nightly    insulin glargine  10 Units SubCUTAneous Daily    sertraline  25 mg Oral Daily    sodium chloride  1 spray Each Nostril BID    piperacillin-tazobactam  3,375 mg IntraVENous Q8H    lactobacillus  2 capsule Oral TID    apixaban  5 mg Oral BID    metoprolol succinate  50 mg Oral BID    amiodarone  400 mg Oral BID    Followed by   Germán Blanco ON 2022] amiodarone  200 mg Oral Daily    ipratropium-albuterol  1 ampule Inhalation TID    atorvastatin  40 mg Oral Daily    finasteride  5 mg Oral Daily    niacin  500 mg Oral Nightly    tamsulosin  0.4 mg Oral Daily    sodium chloride flush  5-40 mL IntraVENous 2 times per day       PRN Meds:white petrolatum, acetaminophen **OR** acetaminophen, perflutren lipid microspheres, sodium chloride flush, sodium chloride, polyethylene glycol, glucose, dextrose, glucagon (rDNA), dextrose    Physical    VITALS:  BP (!) 115/56   Pulse 79   Temp 96.8 °F (36 °C) (Temporal)   Resp 14   Ht 6' 1\" (1.854 m)   Wt 191 lb 3.2 oz (86.7 kg)   SpO2 95%   BMI 25.23 kg/m²     24HR INTAKE/OUTPUT:      Intake/Output Summary (Last 24 hours) at 2022 1424  Last data filed at 2022 1418  Gross per 24 hour   Intake 420 ml   Output 1375 ml   Net -955 ml       24HR PULSE OXIMETRY RANGE:    SpO2  Av.1 %  Min: 92 %  Max: 100 %    General appearance: alert, appears stated age and cooperative  Lungs: rhonchi bilaterally with cough  Heart: regular rate and rhythm, S1, S2 normal, no murmur, click, rub or gallop  Abdomen: soft, non-tender; bowel sounds normal; no masses,  no organomegaly  Extremities: extremities normal, atraumatic, no cyanosis or edema  Neurologic: Mental status: Alert, oriented, thought content appropriate    Data    CBC:   Recent Labs     04/15/22  0711 04/16/22  0553 04/17/22  0542   WBC 18.4* 15.7* 17.9*   HGB 13.1 12.5 11.6*   HCT 42.4 39.3 36.3*   MCV 97.2 94.9 93.6   * 122* 129*       BMP:  Recent Labs     04/15/22  0711 04/16/22  0553 04/17/22  0542    138 139   K 4.2 4.4 4.1   CL 98 99 97*   CO2 24 26 21*   PHOS  --  3.8 3.0   BUN 38* 42* 39*   CREATININE 1.7* 1.7* 1.5*    ALB:3,BILIDIR:3,BILITOT:3,ALKPHOS:3)@    PT/INR: No results for input(s): PROTIME, INR in the last 72 hours. ABG:   No results for input(s): PH, PO2, PCO2, HCO3, BE, O2SAT, METHB, O2HB, COHB, O2CON, HHB, THB in the last 72 hours. Radiology/Other tests reviewed: CXR reviewed with slightly increased haziness right lower lung field and retrocardiac opacity compared to previous    Assessment:     Principal Problem:    Heart failure ([address]  Resolved Problems:    * No resolved hospital problems. *  bronchiectasis      Plan:       1. Cont with nebs and incentive spirometer/flutter device  2. Cont with oxygen, taper as tolerated  3. Watch fluid balance, diurese as per Cardiology  4. Repeat CT chest in 2-4 weeks to ffup on lymphadenopathy  5. Sputum culture showing pseudomonas, cont with antibiotics, should be able to give for about 1.5 weeks total  6. Can be discharged from pulmonary pov      Time at the bedside, reviewing labs and radiographs, reviewing notes and consultations, discussing with staff and family was more than 35 minutes. Thanks for letting us see this patient in consultation. Please contact us with any questions. Office [phone] or after hours through [employer], x [phone].
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
## 2022-04-17 NOTE — ACP (ADVANCE CARE PLANNING)
Advance Care Planning     Advance Care Planning Activator (Inpatient)  Conversation Note      Date of ACP Conversation: 4/17/2022   Conversation Conducted with: Patient with Decision Making Capacity  ACP Activator: Jaycob Lambert , [address] diagnoses warranting ACP:  Acute hypoxic respiratory failure  HFpEF EF 50-55%   LAMAR on CKD stage III, baseline cr 1.2-1.4  Bilateral hydronephrosis secondary to urinary retention  A. fib RVR status post DCCV anticoagulated on Eliquis  CAP-Pseudomonas  Moderate to severe pharyngeal phase dysphagia  Bronchiectasis  Mediastinal and hilar LAD   Proteus mirabilis UTI  CAD  DM2, A1c 7.2  History of CLL  Thrombocytopenia  Chronic urinary retention  Thrombocytopenia   Hepatic steatosis  Depression    Care Preferences    Ventilation: \"If you were in your present state of health and suddenly became very ill and were unable to breathe on your own, what would your preference be about the use of a ventilator (breathing machine) if it were available to you? \"      Would the patient desire the use of ventilator (breathing machine)?: yes    \"If your health worsens and it becomes clear that your chance of recovery is unlikely, what would your preference be about the use of a ventilator (breathing machine) if it were available to you? \"     Would the patient desire the use of ventilator (breathing machine)?: Yes      Resuscitation  \"CPR works best to restart the heart when there is a sudden event, like a heart attack, in someone who is otherwise healthy. Unfortunately, CPR does not typically restart the heart for people who have serious health conditions or who are very sick. \"    \"In the event your heart stopped as a result of an underlying serious health condition, would you want attempts to be made to restart your heart (answer \"yes\" for attempt to resuscitate) or would you prefer a natural death (answer \"no\" for do not attempt to resuscitate)? \" yes       [x] Yes   [] No   Educated Patient / Tejal Pickering regarding differences between Advance Directives and portable DNR orders. Conversation Outcomes:  [x] ACP discussion completed  [] Existing advance directive reviewed with patient; no changes to patient's previously recorded wishes  [] New Advance Directive completed  [] Portable Do Not Rescitate prepared for Provider review and signature  [] POLST/POST/MOLST/MOST prepared for Provider review and signature    Details of ACP discussion: We discussed the patient's goals of care as well as with his wishes for lifesaving measures including intubation, chest compressions, defibrillation, and resuscitative medications. Patient was newly diagnosed with dysphagia and requires a puréed pudding thick diet. He admits that he is feeling depressed about this. We discussed the possibility of changing his diet back to regular consistency with the understanding that the chances of him continually developing pneumonia are high. He does not wish to do this. His desire is to get rehab and eventually move back to assisted living. He wishes to remain a full code at this time.     Length of ACP Conversation in minutes: 16  Code status following completion of discussion: Full Code     Follow-up plan:    [] Schedule follow-up conversation to continue planning  [] Referred individual to Provider for additional questions/concerns   [] Advised patient/agent/surrogate to review completed ACP document and update if needed with changes in condition, patient preferences or care setting  [] This note routed to one or more involved healthcare providers  [x] None    Electronically signed by NAS Parry NP on 4/17/2022 at 12:27 PM

## 2022-04-17 NOTE — PROGRESS NOTES
[facility] MD Melly Fernandez MD Myrtha Shepherd, MD Aaron Sia, MD Sumit Kelsey, IDANIA Forde, ALINA  Progress Note    4/17/2022    SUBJECTIVE:   4/16: High residual last evening, Biswas catheter placed. Appreciate urology involvement. Ongoing fatigue and malaise with generalized weakness. Swelling seems little bit better today. Denies dyspnea at rest on nasal cannula. Ongoing anorexia and poor appetite. Now switched to dysphagia diet  4/17: Feeling little bit better today. Ongoing anorexia and poor intake. BP a little improved      PROBLEM LIST:    Principal Problem:    Heart failure ([address]  Resolved Problems:    * No resolved hospital problems. *         DIET:    ADULT DIET; Dysphagia - Pureed; Low Sodium (2 gm);  Extremely Thick (Pudding)  ADULT ORAL NUTRITION SUPPLEMENT; Breakfast, Dinner; Fortified Pudding Oral Supplement  ADULT ORAL NUTRITION SUPPLEMENT; Lunch; Frozen Oral Supplement     MEDS (scheduled):    insulin lispro  0-12 Units SubCUTAneous TID WC    insulin lispro  0-6 Units SubCUTAneous Nightly    insulin glargine  10 Units SubCUTAneous Daily    sertraline  25 mg Oral Daily    sodium chloride  1 spray Each Nostril BID    piperacillin-tazobactam  3,375 mg IntraVENous Q8H    lactobacillus  2 capsule Oral TID    apixaban  5 mg Oral BID    metoprolol succinate  50 mg Oral BID    amiodarone  400 mg Oral BID    Followed by   Seble Connors ON 4/24/2022] amiodarone  200 mg Oral Daily    ipratropium-albuterol  1 ampule Inhalation TID    atorvastatin  40 mg Oral Daily    finasteride  5 mg Oral Daily    niacin  500 mg Oral Nightly    tamsulosin  0.4 mg Oral Daily    sodium chloride flush  5-40 mL IntraVENous 2 times per day       MEDS (infusions):   sodium chloride      dextrose         MEDS (prn):  white petrolatum, acetaminophen **OR** acetaminophen, perflutren lipid microspheres, sodium chloride flush, sodium chloride, polyethylene glycol, glucose, dextrose, glucagon (rDNA), dextrose    PHYSICAL EXAM:     Patient Vitals for the past 24 hrs:   BP Temp Temp src Pulse Resp SpO2 Weight   04/17/22 0946 (!) 115/56 96.8 °F (36 °C) Temporal 79 18 92 % --   04/17/22 0911 -- -- -- -- 16 96 % --   04/17/22 0415 -- -- -- -- -- -- 191 lb 3.2 oz (86.7 kg)   04/16/22 2300 (!) 113/47 98 °F (36.7 °C) Oral 83 18 100 % --   04/16/22 2132 -- -- -- -- -- 96 % --   04/16/22 1953 (!) 113/53 97.9 °F (36.6 °C) -- 76 18 93 % --   04/16/22 1515 (!) 103/54 96.6 °F (35.9 °C) Temporal 74 18 94 % --   04/16/22 1052 (!) 93/52 -- -- 67 -- -- --   @      Intake/Output Summary (Last 24 hours) at 4/17/2022 1028  Last data filed at 4/17/2022 0918  Gross per 24 hour   Intake 600 ml   Output 1025 ml   Net -425 ml         Wt Readings from Last 3 Encounters:   04/17/22 191 lb 3.2 oz (86.7 kg)   06/18/21 189 lb (85.7 kg)   12/21/20 202 lb (91.6 kg)       Constitutional:  in no acute distress  HEENT: NC/AT, EOMI, sclera and conjunctiva are clear and anicteric, mucus membranes moist  Neck: Trachea midline, no JVD  Cardiovascular: S1, S2 regular rhythm, no murmur,or rub  Respiratory: Poor AE at the bases, few scattered crackles, no wheeze  Gastrointestinal:  Soft, nontender, nondistended, NABS  Ext: Scant distal lower extremity and dependent edema, feet warm  Skin: dry, no rash  Neuro: awake, alert, interactive      DATA:    Recent Labs     04/15/22  0711 04/16/22  0553 04/17/22  0542   WBC 18.4* 15.7* 17.9*   HGB 13.1 12.5 11.6*   HCT 42.4 39.3 36.3*   MCV 97.2 94.9 93.6   * 122* 129*     Recent Labs     04/15/22  0711 04/16/22  0553 04/17/22  0542    138 139   K 4.2 4.4 4.1   CL 98 99 97*   CO2 24 26 21*   MG 2.0 2.0 1.8   PHOS  --  3.8 3.0   BUN 38* 42* 39*   CREATININE 1.7* 1.7* 1.5*   ALT 36  --   --    AST 39  --   --    BILITOT 1.0  --   --    ALKPHOS 101  --   --        Lab Results   Component Value Date    LABPROT 0.3 (H) 04/15/2022    LABPROT 0.3 04/15/2022 Assessment  1. LAMAR, likely obstructive due to BPH, though given current ongoing diuresis need to consider decreased effective circulating volume as the culprit, particularly as he has not been eating or drinking much of anything in the last several days. Given treatment with 3 antimicrobials in the last 7 days, consider also AIN, though seems less likely than the other 2 possibilities. 0.3 g/day estimated proteinuria. Urine eosinophils negative. 2. Chronic kidney disease, multifactorial principally due to renal microvascular atherosclerotic disease. Baseline creatinine 1.2 to 1.4 mg/dL. Renal ultrasound demonstrates bilateral hydronephrosis, status post Biswas catheter now  Azotemia improving  Clinically stable    Recommendations  1. Agree: Continue Biswas  2. Hold the Demadex (eating drinking hardly anything)  3. Continue supportive care  4. Follow labs, UO  5.   Encourage oral intake    Electronically signed by Alanna Mayfield MD on 4/17/2022 at 10:28 AM
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
## 2022-04-18 NOTE — PROGRESS NOTES
Hospitalist Progress Note      Synopsis: Patient admitted for acute hypoxic respiratory failure 2/2 PNA and decompensated HF. Patient presents to the ED per the direction of the [place] with concerns of heart failure after presenting there with shortness of breath.   On arrival he was hypoxic at 88% on room air. Patient had a episode of atrial flutter with RVR in the ED for which he was given Cardizem and digoxin. Laboratory work-up significant for elevated BNP 3513, leukocytosis. CT chest shows right upper and right middle lobe pulmonary nodules as well as right middle lobe lingular and bilateral lower lobe infiltrates.  Urinalysis positive for infection.  Patient was given Lasix and started on doxycycline and cefepime. Palliative care was consulted for goals of care discussion. Patient would like to remain a full code. Cardiology was consulted for decompensated CHF. He is being diuresed and underwent successful DCCV on . He was placed on amiodarone drip which has now been transitioned to p.o. Therapeutic Lovenox has been transitioned to p.o. Eliquis. Pulmonology is also following given his continued oxygen needs. Neurosurgery was consulted for evaluation of incidental finding of a right frontal small hygroma who does not feel the patient requires any further treatment or follow-up. Sputum culture grew Pseudomonas. He is currently being treated with Zosyn. MBSS revealed moderate to severe pharyngeal phase dysphagia. Nephrology was consulted for worsening LAMAR. Renal ultrasound revealed bilateral hydronephrosis presumably secondary to urinary retention for which urology was consulted and considering a suprapubic catheter placement given urethral erosion. Hospital day 10     Subjective:  Stable overnight. No issues reported. Patient seen and examined. Resting comfortably. Voices no complaints. Records reviewed.      Temp (24hrs), Av °F (36.1 °C), Min:95.4 °F (35.2 °C), Max:98.8 °F (37.1 °C)    DIET: ADULT DIET; Dysphagia - Pureed; Low Sodium (2 gm); Extremely Thick (Pudding)  ADULT ORAL NUTRITION SUPPLEMENT; Breakfast, Dinner; Fortified Pudding Oral Supplement  ADULT ORAL NUTRITION SUPPLEMENT; Lunch; Frozen Oral Supplement  CODE: Full Code    Intake/Output Summary (Last 24 hours) at 4/18/2022 0923  Last data filed at 4/18/2022 0554  Gross per 24 hour   Intake 340 ml   Output 875 ml   Net -535 ml     Review of Systems:     All bolded are positive; please see HPI  General:  Fever, chills, diaphoresis, fatigue, malaise, night sweats, weight loss  Psychological:  Anxiety, disorientation, hallucinations, depression  ENT:  Epistaxis, headaches, vertigo, visual changes. Cardiovascular:  Chest pain, irregular heartbeats, palpitations, paroxysmal nocturnal dyspnea. Respiratory:  Shortness of breath, coughing, sputum production, hemoptysis, wheezing, orthopnea. Gastrointestinal:  Nausea, vomiting, diarrhea, heartburn, constipation, abdominal pain, hematemesis, hematochezia, melena, acholic stools  Genito-Urinary:  Dysuria, urgency, frequency, hematuria  Musculoskeletal:  Joint pain, joint stiffness, joint swelling, muscle pain  Neurology:  Headache, focal neurological deficits, weakness, numbness, paresthesia  Derm:  Rashes, ulcers, excoriations, bruising  Extremities:  Decreased ROM, peripheral edema, mottling    Objective:    /66   Pulse 76   Temp 98.8 °F (37.1 °C) (Axillary)   Resp 16   Ht 6' 1\" (1.854 m)   Wt 197 lb 3.2 oz (89.4 kg)   SpO2 97%   BMI 26.02 kg/m²     General appearance: Elderly male with flat affect though in no apparent distress, appears stated age and cooperative. HEENT: Conjunctivae/corneas clear. Mucous membranes moist.  Neck: Supple. No JVD. Respiratory:  Clear to auscultation bilaterally. Normal respiratory effort. Cardiovascular:  RRR. S1, S2 without MRG. PV: Pulses palpable. +1 pitting edema of the BLE. Abdomen: Soft, non-tender, non-distended. +BS  Musculoskeletal: No obvious deformities. Skin: Normal skin color. No rashes or lesions. Good turgor. Neurologic:  Grossly non-focal. Awake, alert, following commands. Psychiatric: Alert and oriented, flat affect. Medications:  REVIEWED DAILY    Infusion Medications    sodium chloride      dextrose       Scheduled Medications    insulin lispro  0-12 Units SubCUTAneous TID WC    insulin lispro  0-6 Units SubCUTAneous Nightly    insulin glargine  10 Units SubCUTAneous Daily    sertraline  25 mg Oral Daily    sodium chloride  1 spray Each Nostril BID    piperacillin-tazobactam  3,375 mg IntraVENous Q8H    lactobacillus  2 capsule Oral TID    apixaban  5 mg Oral BID    metoprolol succinate  50 mg Oral BID    amiodarone  400 mg Oral BID    Followed by   Sharron Stokes ON 4/24/2022] amiodarone  200 mg Oral Daily    ipratropium-albuterol  1 ampule Inhalation TID    atorvastatin  40 mg Oral Daily    finasteride  5 mg Oral Daily    niacin  500 mg Oral Nightly    tamsulosin  0.4 mg Oral Daily    sodium chloride flush  5-40 mL IntraVENous 2 times per day     PRN Meds: white petrolatum, acetaminophen **OR** acetaminophen, perflutren lipid microspheres, sodium chloride flush, sodium chloride, polyethylene glycol, glucose, dextrose, glucagon (rDNA), dextrose    Labs:     Recent Labs     04/16/22  0553 04/17/22  0542 04/18/22  0534   WBC 15.7* 17.9* 17.6*   HGB 12.5 11.6* 12.0*   HCT 39.3 36.3* 38.0   * 129* 128*       Recent Labs     04/16/22  0553 04/17/22  0542 04/18/22  0534    139 139   K 4.4 4.1 4.4   CL 99 97* 104   CO2 26 21* 27   BUN 42* 39* 33*   CREATININE 1.7* 1.5* 1.4*   CALCIUM 9.6 9.1 9.3   PHOS 3.8 3.0 3.0       Recent Labs     04/18/22  0534   PROT 5.4*   ALKPHOS 86   ALT 36   AST 36   BILITOT 0.8       No results for input(s): INR in the last 72 hours. No results for input(s): Lorenso Favre in the last 72 hours.     Chronic labs:    Lab Results   Component Value Date CHOL 93 04/09/2022    TRIG 81 04/09/2022    HDL 33 04/09/2022    LDLCALC 44 04/09/2022    TSH 1.100 04/10/2022    INR 1.2 05/16/2019    LABA1C 7.2 07/06/2021       Radiology: REVIEWED DAILY    Assessment:  Acute hypoxic respiratory failure  HFpEF EF 50-55%   LAMAR on CKD stage III, baseline cr 1.2-1.4  Bilateral hydronephrosis secondary to urinary retention  A. fib RVR status post DCCV anticoagulated on Eliquis  CAP-Pseudomonas  Moderate to severe pharyngeal phase dysphagia  Bronchiectasis  Mediastinal and hilar LAD   Proteus mirabilis UTI  CAD  DM2, A1c 7.2  History of CLL  Thrombocytopenia  Chronic urinary retention  Thrombocytopenia   Hepatic steatosis  Depression     Plan:  Cardiology, nephrology, and urology following  Pulmonology has signed off  Wean oxygen as tolerated, currently on 3L  Continue duo nebs, incentive spirometry, and flutter device  Continue Zosyn, could likely stop tomorrow  Puréed, pudding thick diet per SLP recs  Diuresis held per nephrology  Monitor renal function, I&O  Encourage PO intake  Indwelling thrasher in place now, urology considering SP catheter given urethral erosion  Increase ISS, add lantus  Recommend outpatient repeat CT chest in 2 to 4 weeks to follow-up on lymphadenopathy    DVT Prophylaxis [] Lovenox  []  Heparin [x] DOAC [] PCDs [] Ambulation    GI Prophylaxis [] PPI  [] H2 Blocker   [] Carafate  [x] Diet/Tube Feeds   Level of care [] Med/Surg  [x] Intermediate  []  ICU   Diet ADULT DIET; Dysphagia - Pureed; Low Sodium (2 gm);  Extremely Thick (Pudding)  ADULT ORAL NUTRITION SUPPLEMENT; Breakfast, Dinner; Fortified Pudding Oral Supplement  ADULT ORAL NUTRITION SUPPLEMENT; Lunch; Frozen Oral Supplement    Family contact [x]  N/A - pt A&O    [] At bedside  [] Phone call     Discharge Plan: [facility] when stable    +++++++++++++++++++++++++++++++++++++++++++++++++  SUMEET Winkler/ Sudarshan [address], [place]  +++++++++++++++++++++++++++++++++++++++++++++++++  NOTE: This report was transcribed using voice recognition software. Every effort was made to ensure accuracy; however, inadvertent computerized transcription errors may be present.

## 2022-04-18 NOTE — PROGRESS NOTES
[facility]  [address], [address], [address] [place]       Pulmonary Progress Note      SUBJECTIVE: Awake and alert, anxious for discharge. Denies any shortness of breath. Radiographs are reviewed. Typically, antibiotics will need to be continued for 14 days in the presence of bronchiectasis for Pseudomonas.     OBJECTIVE    Medications    Continuous Infusions:   sodium chloride      dextrose         Scheduled Meds:   insulin lispro  0-12 Units SubCUTAneous TID WC    insulin lispro  0-6 Units SubCUTAneous Nightly    insulin glargine  10 Units SubCUTAneous Daily    sertraline  25 mg Oral Daily    sodium chloride  1 spray Each Nostril BID    piperacillin-tazobactam  3,375 mg IntraVENous Q8H    lactobacillus  2 capsule Oral TID    apixaban  5 mg Oral BID    metoprolol succinate  50 mg Oral BID    amiodarone  400 mg Oral BID    Followed by   Rafy Reilly ON 2022] amiodarone  200 mg Oral Daily    ipratropium-albuterol  1 ampule Inhalation TID    atorvastatin  40 mg Oral Daily    finasteride  5 mg Oral Daily    niacin  500 mg Oral Nightly    tamsulosin  0.4 mg Oral Daily    sodium chloride flush  5-40 mL IntraVENous 2 times per day       PRN Meds:white petrolatum, acetaminophen **OR** acetaminophen, perflutren lipid microspheres, sodium chloride flush, sodium chloride, polyethylene glycol, glucose, dextrose, glucagon (rDNA), dextrose    Physical    VITALS:  BP (!) 131/59   Pulse 77   Temp 96.7 °F (35.9 °C) (Axillary)   Resp 16   Ht 6' 1\" (1.854 m)   Wt 197 lb 3.2 oz (89.4 kg)   SpO2 94%   BMI 26.02 kg/m²     24HR INTAKE/OUTPUT:      Intake/Output Summary (Last 24 hours) at 2022 1639  Last data filed at 2022 1441  Gross per 24 hour   Intake 220 ml   Output 850 ml   Net -630 ml       24HR PULSE OXIMETRY RANGE:    SpO2  Av.3 %  Min: 92 %  Max: 97 %    General appearance: alert, appears stated age and cooperative  Lungs: rhonchi bilaterally with cough  Heart: regular rate and rhythm, S1, S2 normal, no murmur, click, rub or gallop  Abdomen: soft, non-tender; bowel sounds normal; no masses,  no organomegaly  Extremities: extremities normal, atraumatic, no cyanosis or edema  Neurologic: Mental status: Alert, oriented, thought content appropriate    Data    CBC:   Recent Labs     04/16/22  0553 04/17/22  0542 04/18/22  0534   WBC 15.7* 17.9* 17.6*   HGB 12.5 11.6* 12.0*   HCT 39.3 36.3* 38.0   MCV 94.9 93.6 95.7   * 129* 128*       BMP:  Recent Labs     04/16/22  0553 04/17/22  0542 04/18/22  0534    139 139   K 4.4 4.1 4.4   CL 99 97* 104   CO2 26 21* 27   PHOS 3.8 3.0 3.0   BUN 42* 39* 33*   CREATININE 1.7* 1.5* 1.4*    ALB:3,BILIDIR:3,BILITOT:3,ALKPHOS:3)@    PT/INR: No results for input(s): PROTIME, INR in the last 72 hours. ABG:   No results for input(s): PH, PO2, PCO2, HCO3, BE, O2SAT, METHB, O2HB, COHB, O2CON, HHB, THB in the last 72 hours. Radiology/Other tests reviewed: CXR reviewed with slightly increased haziness right lower lung field and retrocardiac opacity compared to previous    Assessment:     Principal Problem:    Heart failure ([address]  Resolved Problems:    * No resolved hospital problems. *  bronchiectasis      Plan:       1. Cont with nebs and incentive spirometer/flutter device  2. Cont with oxygen, taper as tolerated  3. Watch fluid balance, diurese as per Cardiology  4. Repeat CT chest in 2-4 weeks to ffup on lymphadenopathy  5. Sputum culture showing pseudomonas, cont with antibiotics, should be able to give for about 2 weeks total      Time at the bedside, reviewing labs and radiographs, reviewing notes and consultations, discussing with staff and family was more than 35 minutes. Thanks for letting us see this patient in consultation. Please contact us with any questions. Office [phone] or after hours through [facility], x [phone].

## 2022-04-18 NOTE — PROGRESS NOTES
4/18/2022 11:58 AM  Service: Urology  Group: [facility] (Octavio/Mamta/Anna)    Constantin Hdz  [id number]    Subjective:    He is awake and alert, pleasant  No complaints  Thrasher draining yellow urine     Review of Systems  Constitutional: No fever or chills   Respiratory: +cough  Cardiovascular: negative for chest pain and dyspnea  Gastrointestinal: negative for abdominal pain, diarrhea, nausea and vomiting   : See above  Derm: negative for rash and skin lesion(s)  Neurological: negative for seizures and tremors  Musculoskeletal: Negative    Psychiatric: Negative   All other reviews are negative      Scheduled Meds:   insulin lispro  0-12 Units SubCUTAneous TID WC    insulin lispro  0-6 Units SubCUTAneous Nightly    insulin glargine  10 Units SubCUTAneous Daily    sertraline  25 mg Oral Daily    sodium chloride  1 spray Each Nostril BID    piperacillin-tazobactam  3,375 mg IntraVENous Q8H    lactobacillus  2 capsule Oral TID    apixaban  5 mg Oral BID    metoprolol succinate  50 mg Oral BID    amiodarone  400 mg Oral BID    Followed by   Angeline Juarez ON 4/24/2022] amiodarone  200 mg Oral Daily    ipratropium-albuterol  1 ampule Inhalation TID    atorvastatin  40 mg Oral Daily    finasteride  5 mg Oral Daily    niacin  500 mg Oral Nightly    tamsulosin  0.4 mg Oral Daily    sodium chloride flush  5-40 mL IntraVENous 2 times per day       Objective:  Vitals:    04/18/22 1004   BP: (!) 131/59   Pulse: 77   Resp: 18   Temp: 96.7 °F (35.9 °C)   SpO2: 92%         Allergies: Patient has no known allergies.     General Appearance: awake and alert, no distress   Skin: no rash or erythema  Head: normocephalic and atraumatic  Pulmonary/Chest: normal air movement, no respiratory distress  Abdomen: soft, non-tender, non-distended  Genitourinary: urethral erosion, thrasher draining yellow urine   Extremities: no cyanosis, clubbing or edema         Labs:     Recent Labs     04/18/22  0534      K 4.4

## 2022-04-18 NOTE — PROGRESS NOTES
[facility] MD Alanna Nickerson, MD Rosa Parks MD Raymondo Ny, IDANIA Forde, ALINA  Progress Note    4/18/2022    SUBJECTIVE:   4/16: High residual last evening, Biswas catheter placed. Appreciate urology involvement. Ongoing fatigue and malaise with generalized weakness. Swelling seems little bit better today. Denies dyspnea at rest on nasal cannula. Ongoing anorexia and poor appetite. Now switched to dysphagia diet  4/17: Feeling little bit better today. Ongoing anorexia and poor intake. BP a little improved  4/18: Patient doing better although still no appetite with adequate conversation with less shortness of breath being reported. PROBLEM LIST:    Principal Problem:    Heart failure ([address]  Resolved Problems:    * No resolved hospital problems. *         DIET:    ADULT DIET; Dysphagia - Pureed; Low Sodium (2 gm);  Extremely Thick (Pudding)  ADULT ORAL NUTRITION SUPPLEMENT; Breakfast, Dinner; Fortified Pudding Oral Supplement  ADULT ORAL NUTRITION SUPPLEMENT; Lunch; Frozen Oral Supplement     MEDS (scheduled):    insulin lispro  0-12 Units SubCUTAneous TID WC    insulin lispro  0-6 Units SubCUTAneous Nightly    insulin glargine  10 Units SubCUTAneous Daily    sertraline  25 mg Oral Daily    sodium chloride  1 spray Each Nostril BID    piperacillin-tazobactam  3,375 mg IntraVENous Q8H    lactobacillus  2 capsule Oral TID    apixaban  5 mg Oral BID    metoprolol succinate  50 mg Oral BID    amiodarone  400 mg Oral BID    Followed by   Rosalinda Mendez ON 4/24/2022] amiodarone  200 mg Oral Daily    ipratropium-albuterol  1 ampule Inhalation TID    atorvastatin  40 mg Oral Daily    finasteride  5 mg Oral Daily    niacin  500 mg Oral Nightly    tamsulosin  0.4 mg Oral Daily    sodium chloride flush  5-40 mL IntraVENous 2 times per day       MEDS (infusions):   sodium chloride      dextrose         MEDS (prn):  white petrolatum, acetaminophen **OR** acetaminophen, perflutren lipid microspheres, sodium chloride flush, sodium chloride, polyethylene glycol, glucose, dextrose, glucagon (rDNA), dextrose    PHYSICAL EXAM:     Patient Vitals for the past 24 hrs:   BP Temp Temp src Pulse Resp SpO2 Weight   04/18/22 1231 -- -- -- -- 16 94 % --   04/18/22 1004 (!) 131/59 96.7 °F (35.9 °C) Axillary 77 18 92 % --   04/18/22 0842 -- -- -- -- 16 97 % --   04/18/22 0554 -- -- -- -- -- -- 197 lb 3.2 oz (89.4 kg)   04/17/22 1953 118/66 98.8 °F (37.1 °C) Axillary 76 -- -- --   04/17/22 1546 (!) 123/57 95.4 °F (35.2 °C) Temporal 77 20 96 % --   @      Intake/Output Summary (Last 24 hours) at 4/18/2022 1531  Last data filed at 4/18/2022 1441  Gross per 24 hour   Intake 220 ml   Output 850 ml   Net -630 ml         Wt Readings from Last 3 Encounters:   04/18/22 197 lb 3.2 oz (89.4 kg)   06/18/21 189 lb (85.7 kg)   12/21/20 202 lb (91.6 kg)       Constitutional:  in no acute distress  HEENT: NC/AT, EOMI, sclera and conjunctiva are clear and anicteric, mucus membranes moist  Neck: Trachea midline, no JVD  Cardiovascular: S1, S2 regular rhythm, no murmur,or rub  Respiratory: Poor AE at the bases, few scattered crackles, no wheeze  Gastrointestinal:  Soft, nontender, nondistended, NABS  Ext: Scant distal lower extremity and dependent edema, feet warm  Skin: dry, no rash  Neuro: awake, alert, interactive      DATA:    Recent Labs     04/16/22  0553 04/17/22  0542 04/18/22  0534   WBC 15.7* 17.9* 17.6*   HGB 12.5 11.6* 12.0*   HCT 39.3 36.3* 38.0   MCV 94.9 93.6 95.7   * 129* 128*     Recent Labs     04/16/22  0553 04/17/22  0542 04/18/22  0534    139 139   K 4.4 4.1 4.4   CL 99 97* 104   CO2 26 21* 27   MG 2.0 1.8 2.1   PHOS 3.8 3.0 3.0   BUN 42* 39* 33*   CREATININE 1.7* 1.5* 1.4*   ALT  --   --  36   AST  --   --  36   BILITOT  --   --  0.8   ALKPHOS  --   --  86       Lab Results   Component Value Date    LABPROT 0.3 (H) 04/15/2022 LABPROT 0.3 04/15/2022       Assessment  1. LAMAR, likely obstructive due to BPH, though given current ongoing diuresis need to consider decreased effective circulating volume as the culprit, particularly as he has not been eating or drinking much of anything in the last several days. Given treatment with 3 antimicrobials in the last 7 days, consider also AIN, though seems less likely than the other 2 possibilities. 0.3 g/day estimated proteinuria. Urine eosinophils negative. Today BUN is 33 and creatinine is 1.4 with slowly resolving LAMAR  2. Chronic kidney disease, multifactorial principally due to renal microvascular atherosclerotic disease. Baseline creatinine 1.2 to 1.4 mg/dL. Renal ultrasound demonstrates bilateral hydronephrosis, status post Biswas catheter now  Azotemia improving  Clinically stable    Recommendations  1. Agree: Continue Biswas  2. Hold the Demadex (eating drinking hardly anything)  3. Continue supportive care  4. Follow labs, UO  5.   Encourage oral intake    Electronically signed by Jamia Porter MD on 4/18/2022 at 3:31 PM

## 2022-04-19 NOTE — PROGRESS NOTES
[facility]  [address], [address], [address] [facility]      Pulmonary Progress Note      SUBJECTIVE:  Currently on 1 li NC, lying down in bed, claims doing ok with breathing and no change with minimal cough/sputum production    OBJECTIVE    Medications    Continuous Infusions:   sodium chloride      dextrose         Scheduled Meds:   cefepime  2,000 mg IntraVENous Q8H    lidocaine  5 mL IntraDERmal Once    sodium chloride flush  5-40 mL IntraVENous 2 times per day    heparin flush  1 mL IntraVENous 2 times per day    insulin lispro  0-12 Units SubCUTAneous TID WC    insulin lispro  0-6 Units SubCUTAneous Nightly    insulin glargine  10 Units SubCUTAneous Daily    sertraline  25 mg Oral Daily    sodium chloride  1 spray Each Nostril BID    lactobacillus  2 capsule Oral TID    apixaban  5 mg Oral BID    metoprolol succinate  50 mg Oral BID    amiodarone  400 mg Oral BID    Followed by   Dewey Dawson ON 2022] amiodarone  200 mg Oral Daily    ipratropium-albuterol  1 ampule Inhalation TID    atorvastatin  40 mg Oral Daily    finasteride  5 mg Oral Daily    niacin  500 mg Oral Nightly    tamsulosin  0.4 mg Oral Daily       PRN Meds:sodium chloride flush, sodium chloride, heparin flush, white petrolatum, acetaminophen **OR** acetaminophen, perflutren lipid microspheres, polyethylene glycol, glucose, dextrose, glucagon (rDNA), dextrose    Physical    VITALS:  BP (!) 118/55   Pulse 79   Temp 98.1 °F (36.7 °C) (Temporal)   Resp 18   Ht 6' 1\" (1.854 m)   Wt 194 lb (88 kg)   SpO2 93%   BMI 25.60 kg/m²     24HR INTAKE/OUTPUT:      Intake/Output Summary (Last 24 hours) at 2022 1534  Last data filed at 2022 1458  Gross per 24 hour   Intake 250 ml   Output --   Net 250 ml       24HR PULSE OXIMETRY RANGE:    SpO2  Av.5 %  Min: 93 %  Max: 96 %    General appearance: alert, appears stated age and cooperative  Lungs: rhonchi bilaterally with cough  Heart: regular rate and rhythm, S1, S2 normal, no murmur, click, rub or gallop  Abdomen: soft, non-tender; bowel sounds normal; no masses,  no organomegaly  Extremities: extremities normal, atraumatic, no cyanosis or edema  Neurologic: Mental status: Alert, oriented, thought content appropriate    Data    CBC:   Recent Labs     04/17/22  0542 04/18/22  0534 04/19/22  0644   WBC 17.9* 17.6* 18.4*   HGB 11.6* 12.0* 13.0   HCT 36.3* 38.0 40.3   MCV 93.6 95.7 96.4   * 128* 130       BMP:  Recent Labs     04/17/22  0542 04/18/22  0534 04/19/22  0644    139 142   K 4.1 4.4 4.6   CL 97* 104 104   CO2 21* 27 29   PHOS 3.0 3.0  --    BUN 39* 33* 31*   CREATININE 1.5* 1.4* 1.3*    ALB:3,BILIDIR:3,BILITOT:3,ALKPHOS:3)@    PT/INR: No results for input(s): PROTIME, INR in the last 72 hours. ABG:   No results for input(s): PH, PO2, PCO2, HCO3, BE, O2SAT, METHB, O2HB, COHB, O2CON, HHB, THB in the last 72 hours. Radiology/Other tests reviewed: CXR reviewed with slightly increased haziness right lower lung field and retrocardiac opacity compared to previous    Assessment:     Principal Problem:    Heart failure ([address]  Resolved Problems:    * No resolved hospital problems. *  bronchiectasis      Plan:       1. Cont with nebs and incentive spirometer/flutter device  2. Cont with oxygen, taper as tolerated  3. Watch fluid balance, diurese as per Cardiology  4. Repeat CT chest in 2-4 weeks to ffup on lymphadenopathy  5. Antibiotics as per ID  6. Can be discharged from pulmonary pov      Time at the bedside, reviewing labs and radiographs, reviewing notes and consultations, discussing with staff and family was more than 35 minutes. Thanks for letting us see this patient in consultation. Please contact us with any questions. Office [phone] or after hours through [employer], x [phone].
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
## 2022-04-19 NOTE — PROGRESS NOTES
Hospitalist Progress Note      SYNOPSIS: Patient admitted on 4/8/2022     Patient presents to the ED per the direction of the [facility] with concerns of heart failure after presenting there with shortness of breath.   On arrival he was hypoxic at 88% on room air. Patient had a episode of atrial flutter with RVR in the ED for which he was given Cardizem and digoxin. Laboratory work-up significant for elevated BNP 3513, leukocytosis. CT chest shows right upper and right middle lobe pulmonary nodules as well as right middle lobe lingular and bilateral lower lobe infiltrates.  Urinalysis positive for infection.  Patient was given Lasix and started on doxycycline and cefepime. Palliative care was consulted for goals of care discussion. Patient would like to remain a full code. Cardiology was consulted for decompensated CHF. He is being diuresed and underwent successful DCCV on 4/12. He was placed on amiodarone drip which has now been transitioned to p.o. Therapeutic Lovenox has been transitioned to p.o. Eliquis. Pulmonology is also following given his continued oxygen needs. Neurosurgery was consulted for evaluation of incidental finding of a right frontal small hygroma who does not feel the patient requires any further treatment or follow-up. Sputum culture grew Pseudomonas. He is currently being treated with Zosyn. MBSS revealed moderate to severe pharyngeal phase dysphagia. Nephrology was consulted for worsening LAMAR. Renal ultrasound revealed bilateral hydronephrosis presumably secondary to urinary retention for which urology was consulted and considering a suprapubic catheter placement given urethral erosion.       SUBJECTIVE:    Patient seen and examined. Feels at his baseline functionality. Denies pain or other concerns. Want to be discharged  Precert is pending  Has poor oral intake but states \"what else to do if I am only getting turkey\"  Records reviewed. Stable overnight.  No other overnight issues reported. Temp (24hrs), Av.4 °F (36.3 °C), Min:96.7 °F (35.9 °C), Max:98 °F (36.7 °C)    DIET: ADULT DIET; Dysphagia - Pureed; Low Sodium (2 gm); Extremely Thick (Pudding)  ADULT ORAL NUTRITION SUPPLEMENT; Breakfast, Dinner; Fortified Pudding Oral Supplement  ADULT ORAL NUTRITION SUPPLEMENT; Lunch; Frozen Oral Supplement  CODE: Full Code    Intake/Output Summary (Last 24 hours) at 2022 0822  Last data filed at 2022 1441  Gross per 24 hour   Intake --   Output 325 ml   Net -325 ml       OBJECTIVE:    /68   Pulse 80   Temp 98 °F (36.7 °C) (Axillary)   Resp 18   Ht 6' 1\" (1.854 m)   Wt 194 lb (88 kg)   SpO2 96%   BMI 25.60 kg/m²     General appearance: No apparent distress, appears stated age and cooperative. HEENT:  Conjunctivae/corneas clear. Neck: Supple. No jugular venous distention. Respiratory: Clear to auscultation bilaterally but decreased breath sound on b/l bases, normal respiratory effort  Cardiovascular: Regular rate rhythm, normal S1-S2  Abdomen: Soft, nontender, nondistended  Musculoskeletal: No clubbing, cyanosis, no bilateral lower extremity edema. Brisk capillary refill.   Skin:  Large ecchymosis seen on the left-side abdomen  Neurologic: awake, alert and following commands     ASSESSMENT:    Acute hypoxic respiratory failure  HFpEF EF 50-55%   LAMAR on CKD stage III, baseline cr 1.2-1.4  Bilateral hydronephrosis secondary to urinary retention  A. fib RVR status post DCCV anticoagulated on Eliquis  CAP-Pseudomonas  Moderate to severe pharyngeal phase dysphagia  Bronchiectasis  Mediastinal and hilar LAD   Proteus mirabilis UTI  CAD  DM2, A1c 7.2  History of CLL  Thrombocytopenia  Chronic urinary retention  Thrombocytopenia   Hepatic steatosis  Depression  Hx of fall in NH     PLAN:    Cardiology, nephrology, and urology following  Pulmonology has signed off  Wean oxygen as tolerated, currently on 3L  Continue duo nebs, incentive spirometry, and flutter device  Continue Zosyn. Antibiotic for up to 2 weeks because of bronchiectasis and pseudomonas in sputum as per Pulmonology   - ID consulted   Puréed, pudding thick diet per SLP recs  Diuresis held per nephrology  Monitor renal function, I&O  Encourage PO intake  Urology recommended to leave his thrasher indwelling and change every 4 weeks at this time. Continue OP f/u with Dr. Marialuisa Rivero for possible SPT in the future. No further  interventions are planned at this time  Increase ISS, add lantus  Recommend outpatient repeat CT chest in 2 to 4 weeks to follow-up on lymphadenopathy      DISPOSITION: Atif diamondLeesa Murphy is pending     Medications:  REVIEWED DAILY    Infusion Medications    sodium chloride      dextrose       Scheduled Medications    insulin lispro  0-12 Units SubCUTAneous TID WC    insulin lispro  0-6 Units SubCUTAneous Nightly    insulin glargine  10 Units SubCUTAneous Daily    sertraline  25 mg Oral Daily    sodium chloride  1 spray Each Nostril BID    piperacillin-tazobactam  3,375 mg IntraVENous Q8H    lactobacillus  2 capsule Oral TID    apixaban  5 mg Oral BID    metoprolol succinate  50 mg Oral BID    amiodarone  400 mg Oral BID    Followed by   Sharron Stokes ON 4/24/2022] amiodarone  200 mg Oral Daily    ipratropium-albuterol  1 ampule Inhalation TID    atorvastatin  40 mg Oral Daily    finasteride  5 mg Oral Daily    niacin  500 mg Oral Nightly    tamsulosin  0.4 mg Oral Daily    sodium chloride flush  5-40 mL IntraVENous 2 times per day     PRN Meds: white petrolatum, acetaminophen **OR** acetaminophen, perflutren lipid microspheres, sodium chloride flush, sodium chloride, polyethylene glycol, glucose, dextrose, glucagon (rDNA), dextrose    Labs:     Recent Labs     04/17/22  0542 04/18/22  0534 04/19/22  0644   WBC 17.9* 17.6* 18.4*   HGB 11.6* 12.0* 13.0   HCT 36.3* 38.0 40.3   * 128* 130       Recent Labs     04/17/22  0542 04/18/22  0534 04/19/22  0644    139 142 K 4.1 4.4 4.6   CL 97* 104 104   CO2 21* 27 29   BUN 39* 33* 31*   CREATININE 1.5* 1.4* 1.3*   CALCIUM 9.1 9.3 9.4   PHOS 3.0 3.0  --        Recent Labs     04/18/22  0534   PROT 5.4*   ALKPHOS 86   ALT 36   AST 36   BILITOT 0.8       No results for input(s): INR in the last 72 hours. No results for input(s): Jadene Moh in the last 72 hours. Chronic labs:    Lab Results   Component Value Date    CHOL 93 04/09/2022    TRIG 81 04/09/2022    HDL 33 04/09/2022    LDLCALC 44 04/09/2022    TSH 1.100 04/10/2022    INR 1.2 05/16/2019    LABA1C 7.2 07/06/2021       Radiology: REVIEWED DAILY    +++++++++++++++++++++++++++++++++++++++++++++++++  Pamela Perla MD  [facility] - [address], [place]  +++++++++++++++++++++++++++++++++++++++++++++++++  NOTE: This report was transcribed using voice recognition software. Every effort was made to ensure accuracy; however, inadvertent computerized transcription errors may be present.

## 2022-04-19 NOTE — PROGRESS NOTES
Single lumen midline Placement 4/19/2022    Product number: [id number]   Lot Number: [id number]      Ultrasound: yes   Right Basilic vein:                Upper Arm Circumference: 27cm    Size: 15cm    Exposed Length: 1cm    Internal Length: 14cm   Cut: 0   Vein Measurement: 0.56cm    Reid Calero RN  4/19/2022  2:05 PM
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
## 2022-04-19 NOTE — PROGRESS NOTES
[facility] MD Lucho Gonzalez MD Jewelene Shows, MD Monia Arthurs, MD Ana Crowell, CNP   Milena Forde, ALINA  Progress Note    4/19/2022    SUBJECTIVE:   4/16: High residual last evening, Biswas catheter placed. Appreciate urology involvement. Ongoing fatigue and malaise with generalized weakness. Swelling seems little bit better today. Denies dyspnea at rest on nasal cannula. Ongoing anorexia and poor appetite. Now switched to dysphagia diet  4/17: Feeling little bit better today. Ongoing anorexia and poor intake. BP a little improved  4/18: Patient doing better although still no appetite with adequate conversation with less shortness of breath being reported. 4/19: Patient was seen and evaluated in his room, no new complaints his breathing is much better than before ready for debilitation and to improve his stamina and ambulation  PROBLEM LIST:    Principal Problem:    Heart failure ([address]  Resolved Problems:    * No resolved hospital problems. *         DIET:    ADULT DIET; Dysphagia - Pureed; Low Sodium (2 gm);  Extremely Thick (Pudding)  ADULT ORAL NUTRITION SUPPLEMENT; Breakfast, Dinner; Fortified Pudding Oral Supplement  ADULT ORAL NUTRITION SUPPLEMENT; Lunch; Frozen Oral Supplement     MEDS (scheduled):    cefepime  2,000 mg IntraVENous Q8H    lidocaine  5 mL IntraDERmal Once    sodium chloride flush  5-40 mL IntraVENous 2 times per day    heparin flush  1 mL IntraVENous 2 times per day    insulin lispro  0-12 Units SubCUTAneous TID WC    insulin lispro  0-6 Units SubCUTAneous Nightly    insulin glargine  10 Units SubCUTAneous Daily    sertraline  25 mg Oral Daily    sodium chloride  1 spray Each Nostril BID    lactobacillus  2 capsule Oral TID    apixaban  5 mg Oral BID    metoprolol succinate  50 mg Oral BID    amiodarone  400 mg Oral BID    Followed by   Yumi Anders ON 4/24/2022] amiodarone  200 mg Oral Daily    ipratropium-albuterol  1 ampule Inhalation TID    atorvastatin  40 mg Oral Daily    finasteride  5 mg Oral Daily    niacin  500 mg Oral Nightly    tamsulosin  0.4 mg Oral Daily       MEDS (infusions):   sodium chloride      dextrose         MEDS (prn):  sodium chloride flush, sodium chloride, heparin flush, white petrolatum, acetaminophen **OR** acetaminophen, perflutren lipid microspheres, polyethylene glycol, glucose, dextrose, glucagon (rDNA), dextrose    PHYSICAL EXAM:     Patient Vitals for the past 24 hrs:   BP Temp Temp src Pulse Resp SpO2 Weight   04/19/22 0845 (!) 118/55 98.1 °F (36.7 °C) Temporal 79 18 93 % --   04/19/22 0729 -- -- -- -- -- -- 194 lb (88 kg)   04/18/22 2102 128/68 98 °F (36.7 °C) Axillary 80 18 96 % --   @      Intake/Output Summary (Last 24 hours) at 4/19/2022 1333  Last data filed at 4/18/2022 1441  Gross per 24 hour   Intake --   Output 325 ml   Net -325 ml         Wt Readings from Last 3 Encounters:   04/19/22 194 lb (88 kg)   06/18/21 189 lb (85.7 kg)   12/21/20 202 lb (91.6 kg)       Constitutional:  in no acute distress  HEENT: NC/AT, EOMI, sclera and conjunctiva are clear and anicteric, mucus membranes moist  Neck: Trachea midline, no JVD  Cardiovascular: S1, S2 regular rhythm, no murmur,or rub  Respiratory: Poor AE at the bases, few scattered crackles, no wheeze  Gastrointestinal:  Soft, nontender, nondistended, NABS  Ext: Scant distal lower extremity and dependent edema, feet warm  Skin: dry, no rash  Neuro: awake, alert, interactive      DATA:    Recent Labs     04/17/22  0542 04/18/22  0534 04/19/22  0644   WBC 17.9* 17.6* 18.4*   HGB 11.6* 12.0* 13.0   HCT 36.3* 38.0 40.3   MCV 93.6 95.7 96.4   * 128* 130     Recent Labs     04/17/22  0542 04/18/22  0534 04/19/22  0644    139 142   K 4.1 4.4 4.6   CL 97* 104 104   CO2 21* 27 29   MG 1.8 2.1  --    PHOS 3.0 3.0  --    BUN 39* 33* 31*   CREATININE 1.5* 1.4* 1.3*   ALT  --  36  --    AST  --  36  --    BILITOT  --  0.8  --    [facility] --  86  --        Lab Results   Component Value Date    LABPROT 0.3 (H) 04/15/2022    LABPROT 0.3 04/15/2022       Assessment  1. LAMAR, likely obstructive due to BPH, though given current ongoing diuresis need to consider decreased effective circulating volume as the culprit, particularly as he has not been eating or drinking much of anything in the last several days. Given treatment with 3 antimicrobials in the last 7 days, consider also AIN, though seems less likely than the other 2 possibilities. 0.3 g/day estimated proteinuria. Urine eosinophils negative. Today BUN is 31 and creatinine is 1.3 with slowly resolving LAMAR  2. Chronic kidney disease, multifactorial principally due to renal microvascular atherosclerotic disease. Baseline creatinine 1.2 to 1.4 mg/dL. Renal ultrasound demonstrates bilateral hydronephrosis, status post Biswas catheter now  Azotemia improving  Clinically stable    Recommendations  1. Agree: Continue Biswas  2. Hold the Demadex (eating drinking hardly anything)  3. Continue supportive care  4. Follow labs, UO  5.   Encourage oral intake    Electronically signed by Leilani Miramontes MD on 4/19/2022 at 1:33 PM

## 2022-04-19 NOTE — PROGRESS NOTES
OCCUPATIONAL THERAPY TREATMENT NOTE     [facility]  OT BEDSIDE TREATMENT NOTE      Date:2022  Patient Name: Gregory Little  MRN: [id number]  : 1934  Room: [address] [place], [place] [address]           Per OT Eval:    Evaluating OT: Reena Harris [place], OTR/L [id number]  Referring [facility]  Specific Provider Orders: OT eval and treat   Recommended Adaptive Equipment: 24 hr physical assist      Diagnosis: heart failure (s/p fall, no fxs found)   Surgery: SEY GOVIND CARDIOVERSION W/ ANES 22 & 22  Pertinent Medical History: CAD, CLL, HTN, HLD      Precautions:  Fall Risk, O2, +bed alarm, external catheter, dysphagia diet - pureed     Assessment of current deficits   [x]? ? Functional mobility           [x]? ?ADLs           [x]? ? Strength                  [x]? ? Cognition   [x]? ? Functional transfers         [x]? ? IADLs         [x]? ? Safety Awareness   [x]? ?Endurance   []? ? Fine Coordination                         [x]? ? Balance      []? ? Vision/perception   [x]? ? Sensation     []? ?Gross Motor Coordination             []? ? ROM           []? ? Delirium                   []? ? Motor Control      OT PLAN OF CARE   OT POC based on physician orders, patient diagnosis and results of clinical assessment     Frequency/Duration: 2-3 days/wk for 2 weeks PRN   Specific OT Treatment to include:   * Instruction/training on adapted ADL techniques and AE recommendations to increase functional independence within precautions       * Training on energy conservation strategies, correct breathing pattern and techniques to improve independence/tolerance for self-care routine  * Functional transfer/mobility training/DME recommendations for increased independence, safety, and fall prevention  * Patient/Family education to increase follow through with safety techniques and functional independence  * Recommendation of environmental modifications for increased safety with functional transfers/mobility and ADLs  * Cognitive retraining/development of therapeutic activities to improve problem solving, judgement, memory, and attention for increased safety/participation in ADL/IADL tasks  * Therapeutic exercise to improve motor endurance, ROM, and functional strength for ADLs/functional transfers  * Therapeutic activities to facilitate/challenge dynamic balance, stand tolerance for increased safety and independence with ADLs  * Neuro-muscular re-education: facilitation of righting/equilibrium reactions, midline orientation, scapular stability/mobility, normalization of muscle tone, and facilitation of volitional active controled movement     [facility] He reports using electric w/c and SPTs.       Pain Level: Pt c/o BUE pain stating \"I'm a pin cushion\" - therapist provided repositioning techniques & activity modifications/adaptations to promote functional engagement   Cognition: A&O: 3/4; Follows 1-2 step directions, pleasant & cooperative              Memory:  fair+              Sequencing:  fair+             Problem solving:  fair+             Judgement/safety:  fair-     Functional Assessment:  AM-PAC Daily Activity Raw Score: 14/24    Initial Eval Status  Date: 4/10/22 Treatment Status  Date:  4/19/22 STGs=LTGs  Time Frame: 10-14 days   Feeding SBA  NT  SUP   Grooming SBA (seated for facial washing)  Min A  Pt washed face & brushed dentures seated EOB    SUP   UB Dressing Mod A (due to limited ROM)  NT Min A   LB Dressing Max A (assist with socks) Dependent  Don socks Mod A    Bathing Max A (simulated) Max A  Simulated seated EOB Mod A    Toileting Max A NT Mod A   Bed Mobility  Log roll: Mod A  Supine to sit: Mod A   Sit to supine: Mod A  .Log roll: Min A  Supine to sit: Max A   Sit to supine: Max A    Log roll CGA  Supine to sit: CGA   Sit to supine: CGA   Functional Transfers Sit to stand: Mod A   Stand to sit:Mod A  Commode: NT (RN notified to [facility] for rm) Sit to stand: Mod A x2  Stand to sit:Mod A x2  Use of ww to maintain dynamic standing balance. CGA   Functional Mobility NT (pt too fearful) Mod A x2  Less than home distance using ww Min A if appropriate (pt reported not completing prior and only completed SPTs)   Balance Sitting: SBA  Standing: Min A  Sitting:   Static: SBA  Dynamic: Min A    Standing: Mod A x2     Activity Tolerance fair Fair-     Visual/  Perceptual Glasses: yes                       Comments: RN approved session. Upon arrival pt supine in bed & agreeable to OT session. Pt required assist for trunk control & BLE management to complete supine <> sit transfers. While seated EOB pt required assist to open lids & brush dentures with 100% task completion. Pt washed face seated EOB utilizing LUE d/t increased pain of RUE - pt stating \"I'm a pin cushion\". Pt educated on activity modifications/adaptations to promote functional engagement & safety awareness. Pt required min verbal cues & proper positioning while seated EOB during ADL tasks. Pt completed x2 sit<>stand transfers. 1st trail from EOB, 2nd trial from bedside chair. Pt required assist to complete functional mobility from bed to bedside chair with assist for dynamic standing balance & ww management/safety. Pt demo'd flexed posture throughout duration of session. Pt educated on fall prevention strategies & proper body mechanics to promote skin/joint integrity & implementation of fall prevention strategies throughout functional tasks. At end of session pt supine in bed with +bed alarm, all lines and tubes intact, call light within reach. SpO2 Vitals:   -Supine in bed 90%  -Seated EOB 93%  -After light activity seated EOB 90%  -Completing functional mobility from bed to bedside chair 88% - pt educated on breathing techniques, ~1 minute recovery to 92%  -End of session 92%    · Pt has made fair- progress towards set goals.    · Continue with current plan of care      Treatment Time In:9:05a            Treatment Time Out: 9:45a Treatment Charges: Mins Units   Ther Ex  [id number]     Manual Therapy [id number]     Thera Activities [id number]    ADL/Home Mgt [id number] 25    Neuro Re-ed [id number]     Group Therapy      Orthotic manage/training  [id number]     Non-Billable Time     Total Timed Treatment 40 3         Camacho Naranjo OTR/L; [id number]

## 2022-04-19 NOTE — CARE COORDINATION
Transportation set-up for ; [employer], bedside RN (provided covid as well), pt, son Negar Frances, and cousin Leila Dakins; HENS completed; envelope and ambulance form completed in soft-chart.

## 2022-04-19 NOTE — CARE COORDINATION
Per Roxana pre-cert has been obtained, ID has seen recommended IV cefepime 2 g Q 8hr; messaged attending to check for discharge.

## 2022-04-19 NOTE — PROGRESS NOTES
Physical Therapy  Physical Therapy Treatment    Name: Ivelisse Osuna  : 1934  MRN: [id number]      Date of Service: 2022    Evaluating PT:  Tracey Wood, PT, DPT [id number]    Room #:  5236/1793-V  Diagnosis:  Tachycardia [R00.0]  Heart failure ([facility] [I50.9]  Acute cystitis with hematuria [N30.01]  Pneumonia due to infectious organism, unspecified laterality, unspecified part of lung [J18.9]  Congestive heart failure, unspecified HF chronicity, unspecified heart failure type ([facility] [I50.9]  PMHx/PSHx:  Leukemia, HLD, HTN, CAD, CABG, cervical fusion  Procedure/Surgery:  GOVIND (2022); GOVIND and Cardioversion (2022)  Precautions:  Falls, thrasher catheter, O2  Equipment Needs:  TBD  Equipment Owned: Manual wheelchair, electric wheelchair    SUBJECTIVE:    Pt from nursing facility. Pt non-ambulatory PTA; used manual wheelchair or electric wheelchair for mobility; performed stand pivot transfers to/from wheelchair independently. OBJECTIVE:   Initial Evaluation  Date: 2022 Treatment  2022 Short Term/ Long Term   Goals   AM-PAC 6 Clicks 85/08 83/39    Was pt agreeable to Eval/treatment? Yes Yes    Does pt have pain? No c/o pain -8/10 BUE    Bed Mobility  Rolling: NT  Supine to sit: ModA  Sit to supine: NT  Scooting: NT Rolling: Jamaal  Supine to sit: MaxA  Sit to supine: MaxA  Scooting: MaxA (seated) Rolling: Independent  Supine to sit: Independent  Sit to supine: Independent  Scooting: Independent   Transfers Sit to stand: ModA  Stand to sit: ModA  Stand pivot: ModA with no AD;  ModA with [employer] Sit to stand: [address] x2  Stand to sit: [address] x2   Stand pivot: ModA x2 with [employer] Sit to stand: Ryan  Stand to sit: Ryan  Stand pivot: Ryan with AAD   Ambulation    3 feet with no AD ModA; 3 feet with [employer] ModA 3', 3' with [employer] ModA x2 3 feet with AAD Ryan   Wheelchair mobility  NT NT 50 feet using BUE Independent   ROM BUE:  See OT note  BLE:  WFL     Strength BUE:  See OT note  BLE:  Grossly 4+/5     Balance Sitting EOB:  SBA  Dynamic Standing:  ModA with [employer] Sitting EOB:  SBA  Dynamic Standing:  ModA x2 with [employer] Sitting EOB:  Independent  Dynamic Standing:  Ryan with AAD     Pt is A & O x 4  Sensation:  No reports of numbness/tingling to extremities  Edema:  Unremarkable    Vitals:   HR 70, SpO2 91% at rest.  HR 73-76, SpO2 87% (92% with rest) during/following activity. Patient education  Pt educated on safety during functional mobility. Patient response to education:   Pt verbalized understanding Pt demonstrated skill Pt requires further education in this area   Yes Yes Yes         ASSESSMENT:    Comments:  Session cleared by nursing. Patient in semi-Reece's position upon arrival; agreeable to PT session with OT collaboration. Required increased time, assistance of trunk and BLE to perform bed mobility. Tolerated sitting EOB for extended period of time while interdisciplinary care was provided; required intermittent assistance to maintain balance. Patient requested to return to bed at end of session rather than staying seated in bedside chair. Required increased time, verbal cues related to positioning and sequencing to ensure safety during functional transfers. Exhibited flexed forward standing posture, requiring verbal cues to correct. Ambulated to/from bedside chair with decreased speed, decreased step height/length, unsteadiness. Activity limited by pain and fatigue. Exhibited shortness of breath during activity. Vitals monitored and noted. Exhibited decreased SpO2 with activity; recovered within ~1 minute of rest. Rest breaks provided between activity bouts. Patient left in semi-Reece's position with call light in reach.     Treatment:  Patient practiced and was instructed in the following treatment:     Bed mobility - verbal cues to facilitate proper positioning and sequencing; physical assistance provided during activity   Static/dynamic sitting - performed to promote upright tolerance, postural awareness, and balance maintenance   Functional transfers - verbal cues to facilitate proper positioning and sequencing, particularly related to hand/foot placement; physical assistance provided during activity   Ambulation - verbal cues to facilitate proper positioning and sequencing; physical assistance provided during activity    PLAN:    Patient is making good progress towards established goals. Will continue with current POC.       Time in  0905  Time out  0945    Total Treatment Time  40 minutes     CPT codes:  [] Gait training 23336 0 minutes  [] Manual therapy 30742 0 minutes  [x] Therapeutic activities 05268 40 minutes  [] Therapeutic exercises 22948 0 minutes  [] Neuromuscular reeducation 37289 0 minutes    Temi Petersen PT, DPT  [id number]

## 2022-04-19 NOTE — CARE COORDINATION
Pre-cert pending, called for therapy updates as per liaison request; ID has seen, pt for midline and iv cefepime; discharge plan remains [facility] once pre-cert has been obtained. Envelope and ambulance form completed in soft-chart, will need HENS completed once pre-cert has been obtained, and negative covid day of discharge.

## 2022-04-19 NOTE — PROGRESS NOTES
Messaged IV team for midline placement via [employer] per Dr. Randy Dorsey note on [email or web address].

## 2022-04-19 NOTE — DISCHARGE SUMMARY
Hospitalist Discharge Summary    Patient ID: Florencio Franklin   Patient : 1934  Patient's PCP: Zak Rivera MD    Admit Date: 2022   Admitting Physician: Martínez Sandoval MD    Discharge Date:  2022   Discharge Physician: Rossi Lal MD   Discharge Condition: Stable  Discharge Disposition: [facility] course in brief:  (Please refer to daily progress notes for a comprehensive review of the hospitalization by requesting medical records)    Patient presents to the ED per the direction of the [address] [place] [address] with concerns of heart failure after presenting there with shortness of breath.   On arrival he was hypoxic at 88% on room air. Patient had a episode of atrial flutter with RVR in the ED for which he was given Cardizem and digoxin. Laboratory work-up significant for elevated BNP 3513, leukocytosis. CT chest shows right upper and right middle lobe pulmonary nodules as well as right middle lobe lingular and bilateral lower lobe infiltrates.  Urinalysis positive for infection.  Patient was given Lasix and started on doxycycline and cefepime. Palliative care was consulted for goals of care discussion. Patient would like to remain a full code. Cardiology was consulted for decompensated CHF. He is being diuresed and underwent successful DCCV on . He was placed on amiodarone drip which has now been transitioned to p.o. Therapeutic Lovenox has been transitioned to p.o. Eliquis. Pulmonology is also following given his continued oxygen needs. Neurosurgery was consulted for evaluation of incidental finding of a right frontal small hygroma who does not feel the patient requires any further treatment or follow-up. Sputum culture grew Pseudomonas. He is currently being treated with Zosyn. MBSS revealed moderate to severe pharyngeal phase dysphagia. Nephrology was consulted for worsening LAMAR.   Renal ultrasound revealed bilateral hydronephrosis presumably secondary to urinary retention for which urology was consulted and considering a suprapubic catheter placement given urethral erosion, however, Urology recommended to continue Biswas cath, patient will se his Urology as outpatient and discuses SPC. Regarding bronchiectasis and infiltrate, pulmonary recommended up to 14 days of treatment. ID recommended cefepime 1 grm every 8 hrs. Patient feels he is at baseline and is eager to leave the hospital. No other active concerns at this moment. Patient has reached the maximum benefits of current hospitalization and is DC to SNF on stable condition     Exam:    General appearance: No apparent distress, appears stated age and cooperative. HEENT:  Conjunctivae/corneas clear. Neck: Supple. No jugular venous distention. Respiratory: Clear to auscultation bilaterally but decreased breath sound on b/l bases, normal respiratory effort  Cardiovascular: Regular rate rhythm, normal S1-S2  Abdomen: Soft, nontender, nondistended  Musculoskeletal: No clubbing, cyanosis, no bilateral lower extremity edema. Brisk capillary refill.   Skin:  Large ecchymosis seen on the left-side abdomen  Neurologic: awake, alert and following commands     Consults:   IP CONSULT TO INTERNAL MEDICINE  IP CONSULT TO HEART FAILURE NURSE/COORDINATOR  IP CONSULT TO DIETITIAN  IP CONSULT TO PULMONOLOGY  IP CONSULT TO CARDIOLOGY  IP CONSULT TO PALLIATIVE CARE  IP CONSULT TO NEUROSURGERY  IP CONSULT TO NEPHROLOGY  IP CONSULT TO UROLOGY  IP CONSULT TO INFECTIOUS DISEASES    Discharge Diagnoses:    Acute hypoxic respiratory failure  HFpEF EF 50-55%   LAMAR on CKD stage III, baseline cr 1.2-1.4  Bilateral hydronephrosis secondary to urinary retention  A. fib RVR status post DCCV anticoagulated on Eliquis  CAP-Pseudomonas  Moderate to severe pharyngeal phase dysphagia  Bronchiectasis  Mediastinal and hilar LAD   Proteus mirabilis UTI  CAD  DM2, A1c 7.2  History of CLL  Thrombocytopenia  Chronic urinary retention  Thrombocytopenia   Hepatic steatosis  Depression    Discharge Instructions / Follow up:    Future Appointments   Date Time Provider Orly Hendricks   4/27/2022 12:15 PM [facility] ROOM 1 SEY[facility]   5/12/2022 10:00 AM NAS Kirk CNP CARDIO [facility]       Continued appropriate risk factor modification of blood pressure, diabetes and serum lipids will remain essential to reducing risk of future atherosclerotic development    Activity: activity as tolerated    Significant labs:  CBC:   Recent Labs     04/17/22  0542 04/18/22  0534 04/19/22  0644   WBC 17.9* 17.6* 18.4*   RBC 3.88 3.97 4.18   HGB 11.6* 12.0* 13.0   HCT 36.3* 38.0 40.3   MCV 93.6 95.7 96.4   RDW 13.9 13.8 13.9   * 128* 130     BMP:   Recent Labs     04/17/22  0542 04/18/22  0534 04/19/22  0644    139 142   K 4.1 4.4 4.6   CL 97* 104 104   CO2 21* 27 29   BUN 39* 33* 31*   CREATININE 1.5* 1.4* 1.3*   MG 1.8 2.1  --    PHOS 3.0 3.0  --      LFT:  Recent Labs     04/18/22  0534   PROT 5.4*   ALKPHOS 86   ALT 36   AST 36   BILITOT 0.8     PT/INR: No results for input(s): INR, APTT in the last 72 hours. BNP: No results for input(s): BNP in the last 72 hours.   Hgb A1C:   Lab Results   Component Value Date    LABA1C 7.2 07/06/2021     Folate and B12:   Lab Results   Component Value Date    IYLQOKCX23 >2000 (H) 04/15/2022   ,   Lab Results   Component Value Date    FOLATE >20.0 04/15/2022     Thyroid Studies:   Lab Results   Component Value Date    TSH 1.100 04/10/2022       Urinalysis:    Lab Results   Component Value Date    NITRU Negative 04/15/2022    WBCUA PACKED 04/08/2022    BACTERIA MODERATE 04/08/2022    RBCUA 2-5 04/08/2022    RBCUA 10-20 10/10/2012    BLOODU Negative 04/15/2022    SPECGRAV 1.010 04/15/2022    GLUCOSEU Negative 04/15/2022       Imaging:  ECHO Transesophageal    Result Date: 4/12/2022  Transesophageal Echocardiography Report (GOVIND)   Demographics   Patient Name       Lauro Pimentel  Gender               Male [address]     [id number]       Room Number          7402  Number   Account #          [de-identified]      Procedure Date       04/12/2022   Corporate ID                      Ordering Physician   Leonela Marcos MD   Accession Number   [id number]     Referring Physician   Date of Birth      1934     Sonographer          Tony Pablito CINTHIA   Age                80 year(s)     Interpreting         Mary Fatima MD                                    Physician                                     Any Other  Procedure Type of Study   GOVIND procedure:Transesophageal Echo (GOVIND), Doppler Echocardiography, Color  Flow Velocity Mapping. Procedure Date Date: 04/12/2022 Start: 11:00 AM Study Location: Portable Technical Quality: Adequate visualization Indications:Atrial fibrillation. Patient Status: Routine Contrast Medium: Definity. Height: 73 inches Weight: 190 pounds BSA: 2.11 m^2 BMI: 25.07 kg/m^2 GOVIND Performed By: the attending and the sonographer  Type of Anesthesia: Moderate sedation  Allergies   - No known allergies. Findings   Left Atrium  No evidence of a left atrial appendage thrombus / Definity contrast used. Conclusions   Summary  No evidence of a left atrial appendage thrombus / Definity contrast used. Signature   ----------------------------------------------------------------  Electronically signed by Mary Fatima MD(Interpreting  physician) on 04/12/2022 12:21 PM  ----------------------------------------------------------------  [email or web address] [id number]    ECHO Transesophageal    Result Date: 4/12/2022  Transesophageal Echocardiography Report (GOVIND)   Demographics   Patient Name    Davidson Ayon Gender            Male                  [address]  [id number]      Room Number       [address]  Number   Account #       [de-identified]     Procedure Date    04/11/2022   Corporate ID                  Ordering Alfonso Kim MD                                Physician   Accession       [id number]    Referring  Number                        Physician   Date of Birth   1934    Sonographer       Meir Baltazar RDCS   Age             80 year(s)    Interpreting      [address]                                Physician         Physician Cardiology                                                  Teri Galvez MD                                 Any Other  Procedure Type of Study   GOVIND procedure:Transesophageal Echo (GOVIND), Doppler Echocardiography, Color  Flow Velocity Mapping. Procedure Date Date: 04/11/2022 Start: 09:57 AM Study Location: Portable Technical Quality: Adequate visualization Indications:Atrial fibrillation. Patient Status: Routine Height: 73 inches Weight: 190 pounds BSA: 2.11 m^2 BMI: 25.07 kg/m^2 Rhythm: Within normal limits GOVIND Performed By: the attending and the sonographer  Type of Anesthesia: Moderate sedation  Allergies   - No known allergies. Findings   Left Ventricle  Ejection fraction is visually estimated at 50 to 55%. Left Atrium  There is an organized LILY clot  Moderately dilated left atrium. Right Atrium  Markedly enlarged right atrium size. Mitral Valve  Moderate mitral regurgitation is present. Aortic Valve  Moderate aortic regurgitation is noted. The aortic valve appears sclerotic. Aorta  Moderate atherosclerosis seen in the thoracic aorta. Conclusions   Summary  Ejection fraction is visually estimated at 50 to 55%. There is an organized LILY clot  Moderately dilated left atrium. Markedly enlarged right atrium size. Moderate mitral regurgitation is present. Moderate aortic regurgitation is noted. The aortic valve appears sclerotic. Moderate atherosclerosis seen in the thoracic aorta.    Signature   ----------------------------------------------------------------  Electronically signed by Teri Galvez MD(Interpreting  physician) on 04/12/2022 07:35 AM ----------------------------------------------------------------  [email or web address] [id number]    CT HEAD WO CONTRAST    Result Date: 4/8/2022  EXAMINATION: CT OF THE HEAD WITHOUT CONTRAST  4/8/2022 4:30 pm TECHNIQUE: CT of the head was performed without the administration of intravenous contrast. Dose modulation, iterative reconstruction, and/or weight based adjustment of the mA/kV was utilized to reduce the radiation dose to as low as reasonably achievable. COMPARISON: May 16, 2019 HISTORY: ORDERING SYSTEM PROVIDED HISTORY: Evaluate intracranial abnormality TECHNOLOGIST PROVIDED HISTORY: Has a \"code stroke\" or \"stroke alert\" been called? ->No Reason for exam:->Evaluate intracranial abnormality Decision Support Exception - unselect if not a suspected or confirmed emergency medical condition->Emergency Medical Condition (MA) What reading provider will be dictating this exam?->CRC FINDINGS: BRAIN/VENTRICLES: There is no acute intracranial hemorrhage, mass effect or midline shift. No abnormal extra-axial fluid collection. The gray-white differentiation is maintained without evidence of an acute infarct. There is no evidence of hydrocephalus. Redemonstration of small right frontal subdural hygroma with no adjacent mass effect. ORBITS: The visualized portion of the orbits demonstrate no acute abnormality. SINUSES: The visualized paranasal sinuses and mastoid air cells demonstrate no acute abnormality. SOFT TISSUES/SKULL:  No acute abnormality of the visualized skull or soft tissues. No acute intracranial abnormality or hemorrhage. Cortical atrophy and periventricular leukomalacia. Redemonstration of small right frontal subdural hygroma, with no adjacent mass effect.      XR CHEST PORTABLE    Result Date: 4/14/2022  EXAMINATION: ONE XRAY VIEW OF THE CHEST 4/14/2022 9:07 am COMPARISON: April 8, 2022 HISTORY: ORDERING SYSTEM PROVIDED HISTORY: pneumonia, hypoxia TECHNOLOGIST PROVIDED HISTORY: Reason for exam:->pneumonia, hypoxia What reading provider will be dictating this exam?->CRC FINDINGS: Median sternotomy wires are present. Moderate cardiomegaly. Interstitial and hazy opacities bilaterally slightly increased compared to prior. No pneumothorax. Redemonstration of interstitial and hazy opacities in lung bases appearing similar compared to prior. XR CHEST PORTABLE    Result Date: 4/8/2022  EXAMINATION: ONE XRAY VIEW OF THE CHEST 4/8/2022 4:02 pm COMPARISON: None. HISTORY: ORDERING SYSTEM PROVIDED HISTORY: shortness of breath TECHNOLOGIST PROVIDED HISTORY: Reason for exam:->shortness of breath What reading provider will be dictating this exam?->CRC FINDINGS: The heart is enlarged. Opacity at the lung bases. No pneumothorax. Mild blunting of the left costophrenic angle. There are median sternotomy wires. Surgical hardware in the cervical spine. Old posttraumatic deformity of the right clavicle. Suspect early pulmonary vascular congestion. Superimposed early bibasilar pneumonia cannot be excluded. CTA CHEST W CONTRAST    Result Date: 4/8/2022  EXAMINATION: CTA OF THE CHEST 4/8/2022 4:30 pm TECHNIQUE: CTA of the chest was performed after the administration of intravenous contrast.  Multiplanar reformatted images are provided for review. MIP images are provided for review. Dose modulation, iterative reconstruction, and/or weight based adjustment of the mA/kV was utilized to reduce the radiation dose to as low as reasonably achievable.  COMPARISON: 10/24/2012 CTA chest. HISTORY: ORDERING SYSTEM PROVIDED HISTORY: evaluate for PE TECHNOLOGIST PROVIDED HISTORY: Reason for exam:->evaluate for PE Decision Support Exception - unselect if not a suspected or confirmed emergency medical condition->Emergency Medical Condition (MA) What reading provider will be dictating this exam?->CRC FINDINGS: The patient is status post anterior cervical discectomy and fusion from at least C5 through C7. There are flowing anterior osteophytes along the thoracic spine consistent with diffuse idiopathic skeletal hyperostosis. There are compression fractures of the T11 and L1 vertebral bodies there is generalized osteopenia. There are midline sternotomy wires. No definite pulmonary artery filling defects although tertiary branches of the lower lobes are not ideally opacified. Cardiac size is enlarged. There is atherosclerotic calcification of the thoracic aorta and coronary arteries. There are enlarged AP window, right pretracheal, subcarinal and hilar lymph nodes. This is slightly worsened when compared to the previous exam.  The adrenal glands are normal. There is a moderately large hiatal hernia. There is subtle nodular contour to the hepatic periphery. The spleen is borderline enlarged. There is diverticulosis involving the colon. There is bilateral posterior lower lobe consolidation there is a solid slightly spiculated 15 mm right middle lobe nodule image 131 series 7. This was not demonstrated previously. There is a 7 mm nodule in the right middle lobe image 141 series 7. There are scattered tree in bud infiltrates right middle lobe and to a lesser degree lingula. There is a 6 mm ill-defined nodule right upper lobe laterally image 102 series 7. There is bilateral lower lobe bronchiectasis with peribronchial thickening     No convincing evidence of pulmonary embolism although the distal segmental branches of the posterior lower lobes are not optimally evaluated. Right upper and right middle lobe pulmonary nodules. These may be on an inflammatory/infectious basis although primary/metastatic neoplastic disease cannot be excluded. Follow-up noncontrast chest CT could be performed in 3 months. Alternatively, the larger nodule could be evaluated with PET-CT.  Right middle lobe, lingular and bilateral lower lobe infiltrates, likely on an inflammatory/infectious basis. There is bilateral posterior lower lobe bronchiectasis and peribronchial thickening. Mediastinal and hilar lymphadenopathy. This may be reactive or neoplastic. This is slightly greater than demonstrated previously although distribution is similar. Cardiomegaly and atherosclerosis. Subtle hepatic changes suggest cirrhosis. The spleen is borderline enlarged. US ABDOMEN LIMITED    Result Date: 4/12/2022  EXAMINATION: RIGHT UPPER QUADRANT ULTRASOUND 4/12/2022 9:11 am COMPARISON: None. HISTORY: ORDERING SYSTEM PROVIDED HISTORY: cirrhosis TECHNOLOGIST PROVIDED HISTORY: Reason for exam:->cirrhosis What reading provider will be dictating this exam?->CRC FINDINGS: LIVER:  The liver demonstrates abnormal increased echogenicity without evidence of intrahepatic biliary ductal dilatation. This is concerning for steatosis or early cirrhotic change no discrete mass or duct dilation was observed. There are no signs of adjacent ascites. Color Doppler reveals hepatopetal flow within the portal vein the flow within the hepatic veins is within normal range. BILIARY SYSTEM:  Gallbladder is unremarkable without evidence of pericholecystic fluid, wall thickening or stones. Negative sonographic Hicks's sign. Common bile duct is within normal limits measuring 0.42 cm. RIGHT KIDNEY: The right kidney is grossly unremarkable without evidence of hydronephrosis. Or right kidney measures 10.7 x 5.2. The renal cortex measures 7 mm that is within normal range. No stones or perinephric stranding was identified. PANCREAS:  Visualized region of the pancreas is silhouetted by overlying bowel gas OTHER: No evidence of right upper quadrant ascites. The examination is limited technically by the patient's inability to fully cooperate.      1. Increased echogenicity liver, suggest steatosis     US RETROPERITONEAL COMPLETE    Result Date: 4/15/2022  EXAMINATION: RETROPERITONEAL ULTRASOUND OF THE KIDNEYS AND URINARY BLADDER 4/15/2022 COMPARISON: Ultrasound of the abdomen, 04/12/2022. HISTORY: ORDERING SYSTEM PROVIDED HISTORY: LAMAR TECHNOLOGIST PROVIDED HISTORY: Reason for exam:->LAMAR What reading provider will be dictating this exam?->CRC FINDINGS: Kidneys: The right kidney measures 11.7 cm in length and the left kidney measures 11.7 cm in length. The kidneys are normal in size, contour and echogenicity. Bilateral hydronephrosis is noted. No nephrolithiasis identified. Small cysts are seen along the upper poles of both kidneys, approximating 2.3 cm on the right and 2.0 cm on the left. Bladder: The urinary bladder was distended to 927 cc. No gross bladder wall thickening, bladder calculus or intraluminal debris identified. Mild bilateral hydronephrosis, which may be due to the distended urinary bladder. Repeat imaging following decompression of the bladder may be considered to ensure resolution. RECOMMENDATIONS: Unavailable     FL MODIFIED BARIUM SWALLOW W VIDEO    Result Date: 4/15/2022  EXAMINATION: MODIFIED BARIUM SWALLOW WAS PERFORMED IN CONJUNCTION WITH SPEECH PATHOLOGY SERVICES TECHNIQUE: Fluoroscopic evaluation of the swallowing mechanism was performed using cineradiography with multiple consistency of barium product in conjunction with speech pathology services. FLUOROSCOPY DOSE AND TYPE OR TIME AND EXPOSURES: Fluoro time: 1.1 minutes. Images: COMPARISON: None HISTORY: ORDERING SYSTEM PROVIDED HISTORY: dysphasia TECHNOLOGIST PROVIDED HISTORY: Reason for exam:->dysphasia What reading provider will be dictating this exam?->CRC FINDINGS: Positive examination for aspiration with nectar texture. Penetration elicited with honey texture. No cough. No oral delay. There is retention in the vallecula. No retention in piriform sinuses. No pharyngeal delay. Laryngeal elevation is reduced. 1. Aspiration elicited with nectar texture. 2. Penetration elicited with honey texture.  Please see separate speech pathology report for full discussion of findings and recommendations. XR HIP 2-3 VW W PELVIS RIGHT    Result Date: 4/8/2022  EXAMINATION: ONE XRAY VIEW OF THE PELVIS AND TWO XRAY VIEWS RIGHT HIP 4/8/2022 4:33 pm COMPARISON: None. HISTORY: ORDERING SYSTEM PROVIDED HISTORY: fall TECHNOLOGIST PROVIDED HISTORY: Reason for exam:->fall What reading provider will be dictating this exam?->CRC FINDINGS: There is no evidence of fracture or dislocation. Diffuse osteopenia is noted. Osteo-degenerative changes are seen involving the visualized lower lumbar spine. There is a lumbar scoliosis, with convexity to the right. No other significant osseous abnormality is seen. IV contrast is noted within the distal left ureter and urinary bladder. Tiny phleboliths are seen in the pelvis. No fracture or dislocation. Diffuse osteopenia. Osteo-degenerative changes involving the visualized lower lumbar spine. Lumbar scoliosis, convexity to the right.        Discharge Medications:      Medication List      START taking these medications    * amiodarone 400 MG tablet  Commonly known as: PACERONE  Take 1 tablet by mouth 2 times daily for 9 doses     * amiodarone 200 MG tablet  Commonly known as: CORDARONE  Take 1 tablet by mouth daily  Start taking on: April 24, 2022     apixaban 5 MG Tabs tablet  Commonly known as: ELIQUIS  Take 1 tablet by mouth 2 times daily     ceFEPIme 2 g injection  Commonly known as: MAXIPIME  Infuse 2,000 mg intravenously every 8 hours for 8 days For 8 days     insulin glargine-yfgn 100 UNIT/ML injection vial  Commonly known as: SEMGLEE-YFGN  Inject 8 Units into the skin daily  Start taking on: April 20, 2022     * insulin lispro 100 UNIT/ML injection vial  Commonly known as: HUMALOG  Inject 0-12 Units into the skin 3 times daily (with meals)     * insulin lispro 100 UNIT/ML injection vial  Commonly known as: HUMALOG  Inject 0-6 Units into the skin nightly     ipratropium-albuterol 0.5-2.5 (3) MG/3ML Soln nebulizer solution  Commonly known as: DUONEB  Inhale 3 mLs into the lungs 3 times daily     lactobacillus Caps capsule  Take 2 capsules by mouth in the morning, at noon, and at bedtime     metoprolol succinate 50 MG extended release tablet  Commonly known as: TOPROL XL  Take 1 tablet by mouth in the morning and at bedtime     polyethylene glycol 17 g packet  Commonly known as: GLYCOLAX  Take 17 g by mouth daily as needed for Constipation     sertraline 25 MG tablet  Commonly known as: ZOLOFT  Take 1 tablet by mouth daily  Start taking on: April 20, 2022     sodium chloride 0.65 % nasal spray  Commonly known as: OCEAN, BABY AYR  1 spray by Nasal route 2 times daily for 7 days     white petrolatum Oint ointment  Apply topically 2 times daily as needed (dry skin)         * This list has 4 medication(s) that are the same as other medications prescribed for you. Read the directions carefully, and ask your doctor or other care provider to review them with you.             CONTINUE taking these medications    aspirin 81 MG EC tablet     atorvastatin 80 MG tablet  Commonly known as: LIPITOR     bisacodyl 5 MG EC tablet  Commonly known as: DULCOLAX     Cholecalciferol 50 MCG (2000 UT) Tabs     finasteride 5 MG tablet  Commonly known as: PROSCAR     magnesium hydroxide 400 MG/5ML suspension  Commonly known as: MILK OF MAGNESIA     MULTIVITAMINS/MINERALS ADULT PO     niacin 500 MG extended release capsule     polyvinyl alcohol 1.4 % ophthalmic solution  Commonly known as: LIQUIFILM TEARS     tamsulosin 0.4 MG capsule  Commonly known as: FLOMAX        STOP taking these medications    furosemide 20 MG tablet  Commonly known as: LASIX     guaiFENesin 200 MG tablet     loperamide 2 MG capsule  Commonly known as: IMODIUM     metoprolol tartrate 25 MG tablet  Commonly known as: LOPRESSOR           Where to Get Your Medications      You can get these medications from any pharmacy    Bring a paper prescription for each of these medications  · ceFEPIme 2 g injection     Information about where to get these medications is not yet available    Ask your nurse or doctor about these medications  · amiodarone 200 MG tablet  · amiodarone 400 MG tablet  · apixaban 5 MG Tabs tablet  · insulin glargine-yfgn 100 UNIT/ML injection vial  · insulin lispro 100 UNIT/ML injection vial  · insulin lispro 100 UNIT/ML injection vial  · ipratropium-albuterol 0.5-2.5 (3) MG/3ML Soln nebulizer solution  · lactobacillus Caps capsule  · metoprolol succinate 50 MG extended release tablet  · polyethylene glycol 17 g packet  · sertraline 25 MG tablet  · sodium chloride 0.65 % nasal spray  · white petrolatum Oint ointment         Time Spent on discharge is more than 45 minutes in the examination, evaluation, counseling and review of medications and discharge plan.    +++++++++++++++++++++++++++++++++++++++++++++++++  Marcelle Medina MD  [facility] Physician - [address] [place] [address]  +++++++++++++++++++++++++++++++++++++++++++++++++  NOTE: This report was transcribed using voice recognition software. Every effort was made to ensure accuracy; however, inadvertent computerized transcription errors may be present.

## 2022-04-27 NOTE — PROGRESS NOTES
Congestive Heart Failure [address]   1934          Referring Provider:  DR. Aisha Ann  PRimary Care Physician: DR. Los Franco  Cardiologist: DR. Aisha Ann  Nephrologist: DR. AYALA        History of Present Illness:     Esther Berger is a 80 y.o. male with a history of HFpEF, most recent EF 50-55% ON 4/11/20222. Jaycob Paz Patient Story:    He does  have dyspnea with exertion, shortness of breath, or decline in overall functional capacity. He does not have orthopnea, PND, nocturnal cough or hemoptysis. He does not have abdominal distention or bloating, early satiety, anorexia/change in appetite. He does NOT TAKE DIURETIC , PT HAS COWAN CATHETER DRAINING PALE YELLOW URINE. Jaycob Jackson He does have SLIGHT  lower extremity edema. He denies lightheadedness, dizziness. He denies palpitations, syncope or near syncope. He does not complain of chest pain, pressure, discomfort. No Known Allergies      No outpatient medications have been marked as taking for the 4/27/22 encounter [facility] Encounter) with [facility] ROOM 1. Prior to Visit Medications    Medication Sig Taking?  Authorizing Provider   ceFEPIme (MAXIPIME) 2 g injection Infuse 2,000 mg intravenously every 8 hours for 8 days For 8 days  Neptali Titus MD   white petrolatum OINT ointment Apply topically 2 times daily as needed (dry skin)  Khadijah Turcios MD   sodium chloride (OCEAN, BABY AYR) 0.65 % nasal spray 1 spray by Nasal route 2 times daily for 7 days  Khadijah Turcios MD   sertraline (ZOLOFT) 25 MG tablet Take 1 tablet by mouth daily  Khadijah Turcios MD   polyethylene glycol (GLYCOLAX) 17 g packet Take 17 g by mouth daily as needed for Constipation  Khadijah Turcios MD   metoprolol succinate (TOPROL XL) 50 MG extended release tablet Take 1 tablet by mouth in the morning and at bedtime  Khadijah Turcios MD   amiodarone (PACERONE) 400 MG tablet Take 1 tablet by mouth 2 times daily for 9 doses  Chelsea Stewart MD   amiodarone (CORDARONE) 200 MG tablet Take 1 tablet by mouth daily  Chelsea Stewart MD   apixaban (ELIQUIS) 5 MG TABS tablet Take 1 tablet by mouth 2 times daily  Chelsea Stewart MD   insulin glargine-yfgn [facility]) 100 UNIT/ML injection vial Inject 8 Units into the skin daily  Chelsea Stewart MD   insulin lispro (HUMALOG) 100 UNIT/ML injection vial Inject 0-12 Units into the skin 3 times daily (with meals)  Chelsea Stewart MD   insulin lispro (HUMALOG) 100 UNIT/ML injection vial Inject 0-6 Units into the skin nightly  Chelsea Stewart MD   ipratropium-albuterol (DUONEB) 0.5-2.5 (3) MG/3ML SOLN nebulizer solution Inhale 3 mLs into the lungs 3 times daily  Chelsea Stewart MD   lactobacillus (CULTURELLE) CAPS capsule Take 2 capsules by mouth in the morning, at noon, and at bedtime  Chelsea Stewart MD   bisacodyl (DULCOLAX) 5 MG EC tablet Take 5 mg by mouth 2 times daily as needed for Constipation  Historical Provider, MD   magnesium hydroxide (MILK OF MAGNESIA) 400 MG/5ML suspension Take 15 mLs by mouth daily  Historical Provider, MD   Multiple Vitamins-Minerals (MULTIVITAMINS/MINERALS ADULT PO) Take 1 tablet by mouth daily  Historical Provider, MD   Cholecalciferol 50 MCG (2000 UT) TABS Take 2,000 Units by mouth daily  Historical Provider, MD   polyvinyl alcohol (LIQUIFILM TEARS) 1.4 % ophthalmic solution Place 1 drop into both eyes 4 times daily   Historical Provider, MD   aspirin 81 MG EC tablet Take 81 mg by mouth daily   Historical Provider, MD   niacin 500 MG extended release capsule Take 500 mg by mouth nightly  Historical Provider, MD   tamsulosin (FLOMAX) 0.4 MG capsule Take 0.4 mg by mouth 2 times daily   Historical Provider, MD   atorvastatin (LIPITOR) 80 MG tablet Take 40 mg by mouth daily   Historical Provider, MD   finasteride (PROSCAR) 5 MG tablet Take 5 mg by mouth daily   Historical Provider, MD           Guideline directed medical:  ARNI/ACE I/ARB: No  Beta blocker: Yes  Aldosterone antagonist:  No        Physical Examination:     BP (!) 132/58   Pulse 66   Resp 18   SpO2 94%                    HEENT (Head, Ears, Eyes, Nose, & Throat)  HEENT (WDL): Exceptions to WDL  Right Eye: Impaired vision  Left Eye: Impaired vision    Respiratory  Respiratory Pattern: Regular  Respiratory Depth: Normal  Respiratory Quality/Effort: Dyspnea with exertion  Chest Assessment: Chest expansion symmetrical  L Breath Sounds: Diminished,Fine Crackles  R Breath Sounds: Diminished,Fine Crackles              Cardiac  Cardiac Rhythm: Sinus rito,Sinus rhythm    Rhythm Interpretation  Pulse: 66         Gastrointestinal  Abdominal (WDL): Within Defined Limits  RUQ Bowel Sounds: Active  LUQ Bowel Sounds: Active  RLQ Bowel Sounds: Active  LLQ Bowel Sounds: Active          Bowel Sounds  RUQ Bowel Sounds: Active  LUQ Bowel Sounds: Active  RLQ Bowel Sounds: Active  LLQ Bowel Sounds:  Active    Peripheral Vascular  Peripheral Vascular (WDL): Exceptions to WDL  Edema: Right lower extremity,Left lower extremity,Generalized  RLE Edema: Trace,Non-pitting  LLE Edema: Trace,Non-pitting                   Genitourinary  Genitourinary (WDL): Exceptions to WDL (has thrasher catheter)                             Pulse: 66                     LAB DATA:    Last 3 BMP      Sodium (mmol/L)   Date Value   04/19/2022 142   04/18/2022 139   04/17/2022 139     Potassium (mmol/L)   Date Value   04/19/2022 4.6   04/18/2022 4.4   04/17/2022 4.1     Potassium reflex Magnesium (mmol/L)   Date Value   04/08/2022 4.4   05/17/2019 3.8     Chloride (mmol/L)   Date Value   04/19/2022 104   04/18/2022 104   04/17/2022 97 (L)     CO2 (mmol/L)   Date Value   04/19/2022 29   04/18/2022 27   04/17/2022 21 (L)     BUN (mg/dL)   Date Value   04/19/2022 31 (H)   04/18/2022 33 (H)   04/17/2022 39 (H)     Glucose (mg/dL)   Date Value   04/19/2022 146 (H)   04/18/2022 136 (H)   04/17/2022 390 (H) Calcium (mg/dL)   Date Value   04/19/2022 9.4   04/18/2022 9.3   04/17/2022 9.1       Last 3 BNP       Pro-BNP (pg/mL)   Date Value   04/12/2022 3,612 (H)   04/11/2022 4,371 (H)   04/08/2022 3,513 (H)          CBC: No results for input(s): WBC, HGB, PLT in the last 72 hours. BMP:  No results for input(s): NA, K, CL, CO2, BUN, CREATININE, GLUCOSE in the last 72 hours. Hepatic: No results for input(s): AST, ALT, ALB, BILITOT, ALKPHOS in the last 72 hours. Troponin: No results for input(s): TROPONINI in the last 72 hours. BNP: No results for input(s): BNP in the last 72 hours. Lipids: No results for input(s): CHOL, HDL in the last 72 hours. Invalid input(s): LDLCALCU  INR: No results for input(s): INR in the last 72 hours. WEIGHTS:    Wt Readings from Last 3 Encounters:   04/19/22 194 lb (88 kg)   06/18/21 189 lb (85.7 kg)   12/21/20 202 lb (91.6 kg)         TELEMETRY:  Cardiac Regularity: Regular  Cardiac Rhythm/Interpretation: NSR/SB        ASSESSMENT:  Aric Donahue  , UNABLE TO WEIGH PT, NURSING HOME USES [facility], NO WEIGHTS SENT, ASKED FACILITY TO SEND LIST OF WEIGHTS TO NEXT VISIT. Interventions completed this visit:  IV diuretics given no  Lab work obtained yes, BNP/BMP   Reviewed currently prescribed medications with patient, educated on importance of compliance and answered any questions regarding their medication  Educated on signs and symptoms of HF  Educated on low sodium diet    PLAN:  Scheduled to follow up in CHF clinic on   Future Appointments   Date Time Provider Orly Hendricks   5/12/2022  9:15 AM [facility] CHF ROOM 1 [address]   5/12/2022 10:00 AM NAS Lenz -  [facility]     Given clinic phone number and aware of signs and symptoms to call with any HF change in symptoms. chf teaching given PT VERBALIZED UNDERSTANDING. REVIEWED LOW SODIUM DIET.

## 2022-05-09 NOTE — PROGRESS NOTES
Congestive Heart Failure [address]   1934          Referring Provider:  DR. Ivonne Messina  PRimary Care Physician: DR. Moncho Heredia  Cardiologist: DR. Ivonne Messina  Nephrologist: DR. AYALA        History of Present Illness:     Migue Zelaya is a 80 y.o. male with a history of HFpEF, most recent EF 50-55% ON 4/11/20222. Hanane Garcia Patient Story:    He does not  have dyspnea with exertion, shortness of breath, or decline in overall functional capacity. He does not have orthopnea, PND, nocturnal cough or hemoptysis. He does not have abdominal distention or bloating, early satiety, anorexia/change in appetite. He does NOT TAKE DIURETIC , PT HAS COWAN CATHETER DRAINING PALE YELLOW URINE. Hanane Garcia He does have SLIGHT  lower extremity edema. He denies lightheadedness, dizziness. He denies palpitations, syncope or near syncope. He does not complain of chest pain, pressure, discomfort. No Known Allergies      Prior to Visit Medications    Medication Sig Taking?  Authorizing Provider   white petrolatum OINT ointment Apply topically 2 times daily as needed (dry skin)  Carter Reeves MD   sertraline (ZOLOFT) 25 MG tablet Take 1 tablet by mouth daily  Patient taking differently: Take 50 mg by mouth daily   Carter Reeves MD   polyethylene glycol (GLYCOLAX) 17 g packet Take 17 g by mouth daily as needed for Constipation  Carter Reeves MD   metoprolol succinate (TOPROL XL) 50 MG extended release tablet Take 1 tablet by mouth in the morning and at bedtime  Carter Reeves MD   amiodarone (CORDARONE) 200 MG tablet Take 1 tablet by mouth daily  Carter Reeves MD   apixaban (ELIQUIS) 5 MG TABS tablet Take 1 tablet by mouth 2 times daily  Carter Reeves MD   insulin glargine-yfgn [facility]) 100 UNIT/ML injection vial Inject 8 Units into the skin daily  Carter Reeves MD   insulin lispro (HUMALOG) 100 UNIT/ML injection vial Inject 0-12 Units into the skin 3 times daily (with meals)  Patient not taking: Reported on 4/27/2022  Carter Reeves MD   insulin lispro (HUMALOG) 100 UNIT/ML injection vial Inject 0-6 Units into the skin nightly  Patient not taking: Reported on 4/27/2022  Carter Reeves MD   ipratropium-albuterol (DUONEB) 0.5-2.5 (3) MG/3ML SOLN nebulizer solution Inhale 3 mLs into the lungs 3 times daily  Carter Reeves MD   lactobacillus (CULTURELLE) CAPS capsule Take 2 capsules by mouth in the morning, at noon, and at bedtime  Patient not taking: Reported on 5/9/2022  Carter Reeves MD   bisacodyl (DULCOLAX) 5 MG EC tablet Take 5 mg by mouth 2 times daily as needed for Constipation  Historical Provider, MD   magnesium hydroxide (MILK OF MAGNESIA) 400 MG/5ML suspension Take 15 mLs by mouth daily  Historical Provider, MD   Multiple Vitamins-Minerals (MULTIVITAMINS/MINERALS ADULT PO) Take 1 tablet by mouth daily  Historical Provider, MD   Cholecalciferol 50 MCG (2000 UT) TABS Take 2,000 Units by mouth daily  Historical Provider, MD   polyvinyl alcohol (LIQUIFILM TEARS) 1.4 % ophthalmic solution Place 1 drop into both eyes 4 times daily   Historical Provider, MD   aspirin 81 MG EC tablet Take 81 mg by mouth daily   Historical Provider, MD   niacin 500 MG extended release capsule Take 500 mg by mouth nightly  Historical Provider, MD   tamsulosin (FLOMAX) 0.4 MG capsule Take 0.4 mg by mouth 2 times daily   Historical Provider, MD   atorvastatin (LIPITOR) 80 MG tablet Take 40 mg by mouth daily   Historical Provider, MD   finasteride (PROSCAR) 5 MG tablet Take 5 mg by mouth daily   Historical Provider, MD           Guideline directed medical:  ARNI/ACE I/ARB: No  Beta blocker:   Yes  Aldosterone antagonist:  No        Physical Examination:     BP (!) 96/51   Resp 18   Wt 180 lb (81.6 kg) Comment: unable to stand   SpO2 92%   BMI 23.75 kg/m²     Assessment  Charting Type: Shift assessment (chf clinic) Respiratory  Respiratory Pattern: Regular  Respiratory Depth: Normal  Respiratory Quality/Effort: Unlabored  Chest Assessment: Chest expansion symmetrical  L Breath Sounds: Diminished,Fine Crackles  R Breath Sounds: Diminished,Fine Crackles                             Gastrointestinal  Abdominal (WDL): Within Defined Limits               Peripheral Vascular  Peripheral Vascular (WDL): Exceptions to WDL  Edema: Right lower extremity,Left lower extremity,Generalized  RLE Edema: Trace,Non-pitting  LLE Edema: Trace,Non-pitting                   Genitourinary  Genitourinary (WDL): Exceptions to WDL (thrasher)    Psychosocial  Psychosocial (WDL): Within Defined Limits                                              LAB DATA:    Last 3 BMP      Sodium (mmol/L)   Date Value   04/27/2022 138   04/19/2022 142   04/18/2022 139     Potassium (mmol/L)   Date Value   04/27/2022 4.9   04/19/2022 4.6   04/18/2022 4.4     Potassium reflex Magnesium (mmol/L)   Date Value   04/08/2022 4.4   05/17/2019 3.8     Chloride (mmol/L)   Date Value   04/27/2022 103   04/19/2022 104   04/18/2022 104     CO2 (mmol/L)   Date Value   04/27/2022 25   04/19/2022 29   04/18/2022 27     BUN (mg/dL)   Date Value   04/27/2022 34 (H)   04/19/2022 31 (H)   04/18/2022 33 (H)     Glucose (mg/dL)   Date Value   04/27/2022 118 (H)   04/19/2022 146 (H)   04/18/2022 136 (H)     Calcium (mg/dL)   Date Value   04/27/2022 9.6   04/19/2022 9.4   04/18/2022 9.3       Last 3 BNP       Pro-BNP (pg/mL)   Date Value   04/27/2022 4,628 (H)   04/12/2022 3,612 (H)   04/11/2022 4,371 (H)          CBC: No results for input(s): WBC, HGB, PLT in the last 72 hours. BMP:  No results for input(s): NA, K, CL, CO2, BUN, CREATININE, GLUCOSE in the last 72 hours. Hepatic: No results for input(s): AST, ALT, ALB, BILITOT, ALKPHOS in the last 72 hours. Troponin: No results for input(s): TROPONINI in the last 72 hours. BNP: No results for input(s): BNP in the last 72 hours.   Lipids: No results for input(s): CHOL, HDL in the last 72 hours. Invalid input(s): LDLCALCU  INR: No results for input(s): INR in the last 72 hours. WEIGHTS:    Wt Readings from Last 3 Encounters:   05/09/22 180 lb (81.6 kg)   04/19/22 194 lb (88 kg)   06/18/21 189 lb (85.7 kg)         TELEMETRY:  Cardiac Regularity: Regular  Cardiac Rhythm/Interpretation: NSR/SB        ASSESSMENT:  Pedro Luis Donahue --UNABLE TO WEIGH PT, NURSING HOME USES [facility], PER PATIENT WEIGHT TODAY #. Follow up appointment in CHF clinic made for 2 weeks from now. Interventions completed this visit:  IV diuretics given no  Lab work obtained yes, BNP/BMP   Reviewed currently prescribed medications with patient, educated on importance of compliance and answered any questions regarding their medication  Educated on signs and symptoms of HF  Educated on low sodium diet    PLAN:  Scheduled to follow up in CHF clinic on   Future Appointments   Date Time Provider Orly Hendricks   5/12/2022 10:00 AM Alexis Fang   5/23/2022  9:00 AM [facility] CHF ROOM 1 [address]     Given clinic phone number and aware of signs and symptoms to call with any HF change in symptoms.
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
## 2022-05-23 ENCOUNTER — HOSPITAL ENCOUNTER (OUTPATIENT)
Dept: OTHER | Age: 87
Setting detail: THERAPIES SERIES
End: 2022-05-23
Payer: COMMERCIAL
